# Patient Record
Sex: FEMALE | Race: WHITE | NOT HISPANIC OR LATINO | URBAN - METROPOLITAN AREA
[De-identification: names, ages, dates, MRNs, and addresses within clinical notes are randomized per-mention and may not be internally consistent; named-entity substitution may affect disease eponyms.]

---

## 2017-02-23 VITALS
RESPIRATION RATE: 16 BRPM | SYSTOLIC BLOOD PRESSURE: 140 MMHG | OXYGEN SATURATION: 96 % | DIASTOLIC BLOOD PRESSURE: 73 MMHG | TEMPERATURE: 98 F | HEART RATE: 74 BPM

## 2017-02-23 RX ORDER — CHLORHEXIDINE GLUCONATE 213 G/1000ML
1 SOLUTION TOPICAL ONCE
Qty: 0 | Refills: 0 | Status: DISCONTINUED | OUTPATIENT
Start: 2017-02-24 | End: 2017-02-24

## 2017-02-23 NOTE — H&P ADULT. - PMH
Anxiety    CAD (coronary artery disease)    COPD (chronic obstructive pulmonary disease)    Depression    GERD (gastroesophageal reflux disease)    Heart failure, diastolic, chronic    Hyperlipidemia    Hypertension    PUD (peptic ulcer disease)

## 2017-02-23 NOTE — H&P ADULT. - ASSESSMENT
60 y/o Female former smoker, FHx of early MI's (Mother 60's, Father 40's), PMHx HTN, chronic diastolic CHF (EF 60% by NST) hyperlipidemia, diverticulosis s/p recent diverticulitis 2 weeks ago (completed course of Flagyl), ?PUD (non-bleeding), GERD, depression/anxiety, COPD (no hospitalizations/ intubations), Latent Tuberculosis, known CAD s/p DEENA to RCA at [facility] 2009, with recent angiogram 07/2015 @[facility] (RCA patent stent, LMCA mild disease, mid/prox LAD 30%, OM1 70-80% non-obstructive by FFR) who recently presented with CCS III/IV anginal equivalent symptoms and was found to have abnormal stress test and now presents for cardiac catheterization with possible intervention 2/2 suspected CAD progression in patient with known OM1 disease.

## 2017-02-23 NOTE — H&P ADULT. - HISTORY OF PRESENT ILLNESS
[facility] cath lab will be faxing over previous cath reports and Dr. Ledesma's office will be faxing office notes and recent NST. Please check tracker.     62 y/o Female former smoker, FHx of early MI's (Mother 60's, Father 40's), PMHx HTN, hyperlipidemia, diverticulosis s/p recent diverticulitis 2 weeks ago (completed course of Flagyl), ?PUD (non-bleeding), depression/anxiety, COPD (no hospitalizations/ intubations), Latent Tuberculosis, known CAD s/p DEENA at [facility] 2015 and 2011 who recently presented to Dr. Ledesma's office complaining of stress-related, substernal chest pain, rated a 5/10 in severity. States the pain occurs at rest and is self-resolving. She is unable to tell if the pain is related to cardiac vs. anxiety vs. GI . Pt also endorses BACA with ambulation of <1 city block. Denies PND, orthopnea, LE edema, GIB, melena, fevers, chills. She subsequently underwent an abnormal Nuclear Stress Test on ___ which revealed.........    In light of pt's known risk factors as above, CCS class III/IV anginal equivalent symptoms, and recent abnormal Nuclear Stress Test, she now presents to [facility] for cardiac catheterization with possible intervention if clinically warranted to assess CAD progression. [facility] cath lab will be faxing over previous cath reports. Please check tracker.     62 y/o Female former smoker, FHx of early MI's (Mother 60's, Father 40's), PMHx HTN, chronic diastolic CHF (EF 60% by NST) hyperlipidemia, diverticulosis s/p recent diverticulitis 2 weeks ago (completed course of Flagyl), ?PUD (non-bleeding), GERD, depression/anxiety, COPD (no hospitalizations/ intubations), Latent Tuberculosis, known CAD s/p DEENA to RCA at [facility] 2009, with recent angiogram 07/2015 @[facility] (RCA patent stent, LMCA mild disease, mid/prox LAD 30%, OM1 70-80% non-obstructive by FFR) who recently presented to Dr. Ledesma's office complaining of stress-related, substernal chest pain, rated a 5/10 in severity. States the pain occurs at rest and is self-resolving. She is unable to tell if the pain is related to cardiac vs. anxiety vs. GI . Pt also endorses BACA with ambulation of <1 city block. Denies PND, orthopnea, LE edema, GIB, melena, fevers, chills. She subsequently underwent an abnormal Nuclear Stress Test on 02/03/2017 which revealed small, mild to moderate , apical and inferoapical wall defect that are partially reversible consistent with ischemia, LVEF 60%.    In light of pt's known risk factors as above, CCS class III/IV anginal equivalent symptoms, and recent abnormal Nuclear Stress Test, she now presents to [facility] for cardiac catheterization with possible intervention if clinically warranted to assess CAD progression. 62 y/o Female former smoker, FHx of early MI's (Mother 60's, Father 40's), PMHx HTN, chronic diastolic CHF (EF 60% by NST) hyperlipidemia, diverticulosis s/p recent diverticulitis 2 weeks ago (completed course of Flagyl), ?PUD (non-bleeding), GERD, depression/anxiety, COPD (no hospitalizations/ intubations), Latent Tuberculosis, known CAD s/p DEENA to RCA at [facility] 2009, with recent angiogram 07/2015 @[facility] (RCA patent stent, LMCA mild disease, mid/prox LAD 30%, OM1 70-80% non-obstructive by FFR) who recently presented to Dr. Ledesma's office complaining of stress-related, substernal chest pain, rated a 5/10 in severity. States the pain occurs at rest and is self-resolving. She is unable to tell if the pain is related to cardiac vs. anxiety vs. GI . Pt also endorses BACA with ambulation of <1 city block. Denies PND, orthopnea, LE edema, GIB, melena, fevers, chills. She subsequently underwent an abnormal Nuclear Stress Test on 02/03/2017 which revealed small, mild to moderate , apical and inferoapical wall defect that are partially reversible consistent with ischemia, LVEF 60%.    In light of pt's known risk factors as above, CCS class III/IV anginal equivalent symptoms, and recent abnormal Nuclear Stress Test, she now presents to [facility] for cardiac catheterization with possible intervention if clinically warranted to assess CAD progression.

## 2017-02-23 NOTE — H&P ADULT. - FAMILY HISTORY
Father  Still living? Unknown  Family history of heart attack, Age at diagnosis: 41-50     Mother  Still living? Unknown  Family history of heart attack, Age at diagnosis: 61-70

## 2017-02-24 ENCOUNTER — OUTPATIENT (OUTPATIENT)
Dept: OUTPATIENT SERVICES | Facility: HOSPITAL | Age: 62
LOS: 1 days | Discharge: NURSING, REHAB OR LONG-TERM CARE | End: 2017-02-24
Payer: MEDICARE

## 2017-02-24 DIAGNOSIS — I25.110 ATHEROSCLEROTIC HEART DISEASE OF NATIVE CORONARY ARTERY WITH UNSTABLE ANGINA PECTORIS: ICD-10-CM

## 2017-02-24 DIAGNOSIS — E78.5 HYPERLIPIDEMIA, UNSPECIFIED: ICD-10-CM

## 2017-02-24 DIAGNOSIS — I10 ESSENTIAL (PRIMARY) HYPERTENSION: ICD-10-CM

## 2017-02-24 DIAGNOSIS — R94.39 ABNORMAL RESULT OF OTHER CARDIOVASCULAR FUNCTION STUDY: ICD-10-CM

## 2017-02-24 LAB
ALBUMIN SERPL ELPH-MCNC: 3.7 G/DL — SIGNIFICANT CHANGE UP (ref 3.4–5)
ALP SERPL-CCNC: 92 U/L — SIGNIFICANT CHANGE UP (ref 40–120)
ALT FLD-CCNC: 20 U/L — SIGNIFICANT CHANGE UP (ref 12–42)
ANION GAP SERPL CALC-SCNC: 6 MMOL/L — LOW (ref 9–16)
APTT BLD: 28.7 SEC — SIGNIFICANT CHANGE UP (ref 27.5–37.4)
AST SERPL-CCNC: 15 U/L — SIGNIFICANT CHANGE UP (ref 15–37)
BASOPHILS NFR BLD AUTO: 0.4 % — SIGNIFICANT CHANGE UP (ref 0–2)
BILIRUB SERPL-MCNC: 0.3 MG/DL — SIGNIFICANT CHANGE UP (ref 0.2–1.2)
BUN SERPL-MCNC: 16 MG/DL — SIGNIFICANT CHANGE UP (ref 7–23)
CALCIUM SERPL-MCNC: 8.4 MG/DL — LOW (ref 8.5–10.5)
CHLORIDE SERPL-SCNC: 103 MMOL/L — SIGNIFICANT CHANGE UP (ref 96–108)
CHOLEST SERPL-MCNC: 155 MG/DL — SIGNIFICANT CHANGE UP
CK MB CFR SERPL CALC: <1 NG/ML — SIGNIFICANT CHANGE UP (ref 0.5–3.6)
CO2 SERPL-SCNC: 29 MMOL/L — SIGNIFICANT CHANGE UP (ref 22–31)
CREAT SERPL-MCNC: 0.96 MG/DL — SIGNIFICANT CHANGE UP (ref 0.5–1.3)
CRP SERPL-MCNC: <0.29 MG/DL — SIGNIFICANT CHANGE UP
EOSINOPHIL NFR BLD AUTO: 2.4 % — SIGNIFICANT CHANGE UP (ref 0–6)
GLUCOSE SERPL-MCNC: 124 MG/DL — HIGH (ref 70–99)
HBA1C BLD-MCNC: 6.3 % — HIGH (ref 4.8–5.6)
HCT VFR BLD CALC: 37.4 % — SIGNIFICANT CHANGE UP (ref 34.5–45)
HDLC SERPL-MCNC: 43 MG/DL — SIGNIFICANT CHANGE UP
HGB BLD-MCNC: 12.1 G/DL — SIGNIFICANT CHANGE UP (ref 11.5–15.5)
INR BLD: 0.99 — SIGNIFICANT CHANGE UP (ref 0.88–1.16)
LIPID PNL WITH DIRECT LDL SERPL: 80 MG/DL — SIGNIFICANT CHANGE UP
LYMPHOCYTES # BLD AUTO: 27.9 % — SIGNIFICANT CHANGE UP (ref 13–44)
MCHC RBC-ENTMCNC: 27.4 PG — SIGNIFICANT CHANGE UP (ref 27–34)
MCHC RBC-ENTMCNC: 32.4 G/DL — SIGNIFICANT CHANGE UP (ref 32–36)
MCV RBC AUTO: 84.6 FL — SIGNIFICANT CHANGE UP (ref 80–100)
MONOCYTES NFR BLD AUTO: 10.2 % — SIGNIFICANT CHANGE UP (ref 2–14)
NEUTROPHILS NFR BLD AUTO: 59.1 % — SIGNIFICANT CHANGE UP (ref 43–77)
PLATELET # BLD AUTO: 239 K/UL — SIGNIFICANT CHANGE UP (ref 150–400)
POTASSIUM SERPL-MCNC: 4.1 MMOL/L — SIGNIFICANT CHANGE UP (ref 3.5–5.3)
POTASSIUM SERPL-SCNC: 4.1 MMOL/L — SIGNIFICANT CHANGE UP (ref 3.5–5.3)
PROT SERPL-MCNC: 7.4 G/DL — SIGNIFICANT CHANGE UP (ref 6.4–8.2)
PROTHROM AB SERPL-ACNC: 11 SEC — SIGNIFICANT CHANGE UP (ref 10–13.1)
RBC # BLD: 4.42 M/UL — SIGNIFICANT CHANGE UP (ref 3.8–5.2)
RBC # FLD: 13.5 % — SIGNIFICANT CHANGE UP (ref 10.3–16.9)
SODIUM SERPL-SCNC: 138 MMOL/L — SIGNIFICANT CHANGE UP (ref 135–145)
TOTAL CHOLESTEROL/HDL RATIO MEASUREMENT: 3.6 RATIO — SIGNIFICANT CHANGE UP
TRIGL SERPL-MCNC: 161 MG/DL — HIGH
WBC # BLD: 7.2 K/UL — SIGNIFICANT CHANGE UP (ref 3.8–10.5)
WBC # FLD AUTO: 7.2 K/UL — SIGNIFICANT CHANGE UP (ref 3.8–10.5)

## 2017-02-24 PROCEDURE — C1889: CPT

## 2017-02-24 PROCEDURE — 86140 C-REACTIVE PROTEIN: CPT

## 2017-02-24 PROCEDURE — 80061 LIPID PANEL: CPT

## 2017-02-24 PROCEDURE — 82550 ASSAY OF CK (CPK): CPT

## 2017-02-24 PROCEDURE — C1887: CPT

## 2017-02-24 PROCEDURE — 85730 THROMBOPLASTIN TIME PARTIAL: CPT

## 2017-02-24 PROCEDURE — 93010 ELECTROCARDIOGRAM REPORT: CPT

## 2017-02-24 PROCEDURE — 93458 L HRT ARTERY/VENTRICLE ANGIO: CPT | Mod: 26

## 2017-02-24 PROCEDURE — 80053 COMPREHEN METABOLIC PANEL: CPT

## 2017-02-24 PROCEDURE — 93571 IV DOP VEL&/PRESS C FLO 1ST: CPT

## 2017-02-24 PROCEDURE — 85025 COMPLETE CBC W/AUTO DIFF WBC: CPT

## 2017-02-24 PROCEDURE — 93571 IV DOP VEL&/PRESS C FLO 1ST: CPT | Mod: 26

## 2017-02-24 PROCEDURE — 82553 CREATINE MB FRACTION: CPT

## 2017-02-24 PROCEDURE — 93005 ELECTROCARDIOGRAM TRACING: CPT

## 2017-02-24 PROCEDURE — 85610 PROTHROMBIN TIME: CPT

## 2017-02-24 PROCEDURE — C1769: CPT

## 2017-02-24 PROCEDURE — 83036 HEMOGLOBIN GLYCOSYLATED A1C: CPT

## 2017-02-24 PROCEDURE — 93458 L HRT ARTERY/VENTRICLE ANGIO: CPT

## 2017-02-24 RX ORDER — ASPIRIN/CALCIUM CARB/MAGNESIUM 324 MG
325 TABLET ORAL ONCE
Qty: 0 | Refills: 0 | Status: COMPLETED | OUTPATIENT
Start: 2017-02-24 | End: 2017-02-24

## 2017-02-24 RX ORDER — SODIUM CHLORIDE 9 MG/ML
500 INJECTION, SOLUTION INTRAVENOUS
Qty: 0 | Refills: 0 | Status: DISCONTINUED | OUTPATIENT
Start: 2017-02-24 | End: 2017-02-24

## 2017-02-24 RX ORDER — ACETAMINOPHEN 500 MG
650 TABLET ORAL ONCE
Qty: 0 | Refills: 0 | Status: DISCONTINUED | OUTPATIENT
Start: 2017-02-24 | End: 2017-02-24

## 2017-02-24 RX ORDER — SODIUM CHLORIDE 9 MG/ML
1000 INJECTION, SOLUTION INTRAVENOUS
Qty: 0 | Refills: 0 | Status: DISCONTINUED | OUTPATIENT
Start: 2017-02-24 | End: 2017-02-24

## 2017-02-24 RX ADMIN — SODIUM CHLORIDE 50 MILLILITER(S): 9 INJECTION, SOLUTION INTRAVENOUS at 12:12

## 2017-02-24 RX ADMIN — Medication 325 MILLIGRAM(S): at 11:14

## 2017-02-24 NOTE — PROGRESS NOTE ADULT - SUBJECTIVE AND OBJECTIVE BOX
Interventional Cardiology PA SDA Discharge Note    Patient without complaints. Ambulated and voided without difficulties    Afebrile, VSS    Ext:    		Right Radial :   no hematoma,   no  bleeding, dressing; C/D/I      Pulses:    intact RAD to baseline     A/P:  60 y/o Female former smoker, FHx of early MI's (Mother 60's, Father 40's), PMHx HTN, chronic diastolic CHF (EF 60% by NST) hyperlipidemia, diverticulosis s/p recent diverticulitis 2 weeks ago (completed course of Flagyl), ?PUD (non-bleeding), GERD, depression/anxiety, COPD (no hospitalizations/ intubations), Latent Tuberculosis, known CAD s/p DEENA to RCA at [facility] 2009, with recent angiogram 07/2015 @[facility] (RCA patent stent, LMCA mild disease, mid/prox LAD 30%, OM1 70-80% non-obstructive by FFR) who recently presented with CCS III/IV anginal equivalent symptoms and was found to have abnormal stress test.    Pt is now s/p cardiac cath with Dr. Cortez revealing non-obstructive CAD (LM normal, prox LAD50%, FFR 0.91, mid LAD myocardial bridge, distal LCx 50%, prox RCA stent patent, EDP 17).    1.	Stable for discharge as per attending Dr. Ledesma after bed rest, pt voids, groin/wrist stable and 30 minutes of ambulation.  2.	Follow-up with PMD/Cardiologist Dana in 1-2 weeks  3.	Discharged forms signed and copies in chart

## 2017-04-24 ENCOUNTER — HOSPITAL ENCOUNTER (INPATIENT)
Dept: HOSPITAL 74 - JER | Age: 62
LOS: 6 days | Discharge: HOME | DRG: 392 | End: 2017-04-30
Attending: INTERNAL MEDICINE | Admitting: INTERNAL MEDICINE
Payer: COMMERCIAL

## 2017-04-24 VITALS — BODY MASS INDEX: 31 KG/M2

## 2017-04-24 DIAGNOSIS — K66.8: ICD-10-CM

## 2017-04-24 DIAGNOSIS — I25.10: ICD-10-CM

## 2017-04-24 DIAGNOSIS — I27.2: ICD-10-CM

## 2017-04-24 DIAGNOSIS — K57.32: Primary | ICD-10-CM

## 2017-04-24 DIAGNOSIS — Z98.61: ICD-10-CM

## 2017-04-24 DIAGNOSIS — K21.9: ICD-10-CM

## 2017-04-24 DIAGNOSIS — E78.5: ICD-10-CM

## 2017-04-24 DIAGNOSIS — F32.9: ICD-10-CM

## 2017-04-24 DIAGNOSIS — Z87.891: ICD-10-CM

## 2017-04-24 DIAGNOSIS — I10: ICD-10-CM

## 2017-04-24 LAB
ALBUMIN SERPL-MCNC: 3.7 G/DL (ref 3.4–5)
ALP SERPL-CCNC: 95 U/L (ref 45–117)
ALT SERPL-CCNC: 19 U/L (ref 12–78)
AMYLASE SERPL-CCNC: 55 U/L (ref 25–115)
ANION GAP SERPL CALC-SCNC: 10 MMOL/L (ref 8–16)
APPEARANCE UR: CLEAR
AST SERPL-CCNC: 17 U/L (ref 15–37)
BACTERIA #/AREA URNS HPF: (no result) /HPF
BASOPHILS # BLD: 0.3 % (ref 0–2)
BILIRUB SERPL-MCNC: 0.4 MG/DL (ref 0.2–1)
BILIRUB UR STRIP.AUTO-MCNC: NEGATIVE MG/DL
CALCIUM SERPL-MCNC: 8.9 MG/DL (ref 8.5–10.1)
CO2 SERPL-SCNC: 26 MMOL/L (ref 21–32)
COCKROFT - GAULT: 77.46
COLOR UR: (no result)
CREAT SERPL-MCNC: 1.1 MG/DL (ref 0.55–1.02)
DEPRECATED RDW RBC AUTO: 13.5 % (ref 11.6–15.6)
EOSINOPHIL # BLD: 0.1 % (ref 0–4.5)
GLUCOSE SERPL-MCNC: 116 MG/DL (ref 74–106)
INR BLD: 1.05 (ref 0.82–1.09)
KETONES UR QL STRIP: NEGATIVE
LEUKOCYTE ESTERASE UR QL STRIP.AUTO: (no result)
MCH RBC QN AUTO: 28.2 PG (ref 25.7–33.7)
MCHC RBC AUTO-ENTMCNC: 33.4 G/DL (ref 32–36)
MCV RBC: 84.3 FL (ref 80–96)
NEUTROPHILS # BLD: 80.4 % (ref 42.8–82.8)
NITRITE UR QL STRIP: NEGATIVE
PH UR: 7 [PH] (ref 5–8)
PLATELET # BLD AUTO: 207 K/MM3 (ref 134–434)
PMV BLD: 8.3 FL (ref 7.5–11.1)
PROT SERPL-MCNC: 7 G/DL (ref 6.4–8.2)
PROT UR QL STRIP: NEGATIVE
PROT UR QL STRIP: NEGATIVE
PT PNL PPP: 11.6 SEC (ref 9.98–11.88)
RBC # BLD AUTO: 3 /HPF (ref 0–3)
RBC # UR STRIP: NEGATIVE /UL
SP GR UR: 1.01 (ref 1–1.03)
UROBILINOGEN UR STRIP-MCNC: NEGATIVE E.U./DL (ref 0.2–1)
WBC # BLD AUTO: 13.6 K/MM3 (ref 4–10)
WBC # UR AUTO: 5 /HPF (ref 3–5)

## 2017-04-24 RX ADMIN — METRONIDAZOLE SCH MLS/HR: 500 INJECTION, SOLUTION INTRAVENOUS at 14:59

## 2017-04-24 RX ADMIN — METRONIDAZOLE SCH MLS/HR: 500 INJECTION, SOLUTION INTRAVENOUS at 20:10

## 2017-04-24 RX ADMIN — MORPHINE SULFATE PRN MG: 4 INJECTION, SOLUTION INTRAMUSCULAR; INTRAVENOUS at 21:15

## 2017-04-24 RX ADMIN — HEPARIN SODIUM SCH UNIT: 5000 INJECTION, SOLUTION INTRAVENOUS; SUBCUTANEOUS at 22:50

## 2017-04-24 RX ADMIN — SODIUM CHLORIDE SCH MLS/HR: 9 INJECTION, SOLUTION INTRAVENOUS at 23:47

## 2017-04-24 RX ADMIN — PANTOPRAZOLE SODIUM SCH MLS/HR: 40 INJECTION, POWDER, FOR SOLUTION INTRAVENOUS at 21:15

## 2017-04-24 RX ADMIN — MORPHINE SULFATE PRN MG: 4 INJECTION, SOLUTION INTRAMUSCULAR; INTRAVENOUS at 17:25

## 2017-04-24 RX ADMIN — SODIUM CHLORIDE SCH MLS/HR: 9 INJECTION, SOLUTION INTRAVENOUS at 12:50

## 2017-04-24 RX ADMIN — CEFTRIAXONE SCH GM: 1 INJECTION, POWDER, FOR SOLUTION INTRAMUSCULAR; INTRAVENOUS at 12:50

## 2017-04-24 NOTE — EKG
Test Reason : 

Blood Pressure : ***/*** mmHG

Vent. Rate : 073 BPM     Atrial Rate : 073 BPM

   P-R Int : 138 ms          QRS Dur : 072 ms

    QT Int : 410 ms       P-R-T Axes : 074 071 059 degrees

   QTc Int : 451 ms

 

NORMAL SINUS RHYTHM

NORMAL ECG

WHEN COMPARED WITH ECG OF 22-MAY-2011 11:20,

NO SIGNIFICANT CHANGE WAS FOUND

Confirmed by HARLAN HOOKS MD ([id number]) on 4/24/2017 11:05:10 AM

 

Referred By:             Confirmed By:HARLAN HOOKS MD

## 2017-04-24 NOTE — HP
Admitting History and Physical





- Admission


Chief Complaint: Abdominal pain


History of Present Illness: 


Pt is a 60 y/o female w/ multiple medical problems including HTN, HLD, CHF, CAD

, GERD, uterine fibroids, diverticulitis and depression.  In the past 24 hrs pt 

developed diffuse abdominal pain wc then became more suprapubic.  Pt was at 

home and thought it might be her diverticulitis and was thinking of waiting and 

going to see her gastroenterologist however pain then became so severe she came 

to ER.  This abdominal pain was associated w/ nausea/vomiting.  In the ER pt 

had ct scan abd wc showed pneumoperitoneum/acute diverticulitis. Her vital 

signs showed tmax of 100.7 w/ heart rate of 95 and BP of 107/78.


History Source: Patient, Medical Record





- Past Medical History


Cardiovascular: Yes: CAD, HTN, Hyperlipdemia


Gastrointestinal: Yes: Diverticulitis, GERD


Reproductive: Yes: Fibroids


...Pregnant: No


Psych: Yes: Depression





- Smoking History


Smoking history: Former smoker


Have you smoked in the past 12 months: No


If you are a former smoker, when did you quit?: 2008





- Alcohol/Substance Use


Hx Alcohol Use: No





Home Medications





- Allergies


Allergies/Adverse Reactions: 


 Allergies











Allergy/AdvReac Type Severity Reaction Status Date / Time


 


clarithromycin [From Biaxin] Allergy   Verified 04/24/17 04:27


 


codeine Allergy   Verified 04/24/17 04:27


 


Penicillins Allergy   Verified 04/24/17 04:27














- Home Medications


Home Medications: 


Ambulatory Orders





Acetaminophen [Tylenol -] 1,000 mg PO Q6H 04/24/17 


Albuterol Sulfate [Proair Respiclick] 90 mcg IH QID 04/24/17 


Ascorbic Acid [Vitamin C -] 1 tab PO ASDIR 04/24/17 


Aspirin Coated [Ecotrin -] 81 mg PO DAILY 04/24/17 


Bupropion HCl [Bupropion Xl] 150 mg PO DAILY 04/24/17 


Clopidogrel Bisulfate [Plavix -] 75 mg PO DAILY 04/24/17 


Clotrimazole/Betamethasone Dip [Clotrimazole-Betamethasone Lot] 1 applic TD 

ASDIR 04/24/17 


Esomeprazole Magnesium [Nexium 24Hr] 40 mg PO DAILY 04/24/17 


Furosemide [Lasix -] 20 mg PO Q2D 04/24/17 


Lipase/Protease/Amylase [Creon Dr 36,000 Units Capsule] 1 each PO AC 04/24/17 


Loperamide HCl [Loperamide] 2 mg PO Q6H PRN 04/24/17 


Melatonin 5 mg PO HS 04/24/17 


Simvastatin [Zocor -] 40 mg PO HS 04/24/17 











Family Disease History





- Family Disease History


Family History: Unremarkable





Review of Systems





- Review of Systems


Constitutional: reports: Fever, Loss of Appetite, Malaise, Weakness


Eyes: reports: No Symptoms


HENT: reports: No Symptoms


Neck: reports: No Symptoms


Cardiovascular: reports: No Symptoms


Respiratory: reports: No Symptoms


Gastrointestinal: reports: Abdominal Pain, Nausea, Vomiting


Genitourinary: reports: No Symptoms





Physical Examination


Vital Signs: 


 Vital Signs











Temperature  98.5 F   04/24/17 13:48


 


Pulse Rate  78   04/24/17 13:48


 


Respiratory Rate  18   04/24/17 11:15


 


Blood Pressure  131/68   04/24/17 13:48


 


O2 Sat by Pulse Oximetry (%)  96   04/24/17 11:40











Constitutional: Yes: Well Nourished


Eyes: Yes: WNL


HENT: Yes: WNL


Neck: Yes: Supple


Cardiovascular: Yes: WNL, Regular Rate and Rhythm


Respiratory: Yes: WNL, Regular, CTA Bilaterally


Gastrointestinal: Yes: Soft, Abdomen, Obese, Other ((+) minimal tenderness (-) 

guarding/rebound)


Musculoskeletal: Yes: WNL


Extremities: Yes: WNL


Neurological: Yes: WNL, Alert, Oriented


...Motor Strength: WNL





Problem List





- Problems


(1) Diverticulitis


Assessment/Plan: 


Acute diverticulitis w/ pneumoperitoneum


Cont IV antibxs/fluids


Monitor labs


As per surgery


NPO


GI consult


Code(s): K57.92 - DVTRCLI OF INTEST, PART UNSP, W/O PERF OR ABSCESS W/O BLEED   

Qualifiers: 


     Diverticulitis site: large intestine     Diverticulitis bleeding: without 

bleeding     Diverticulitis complication: with perforation     Qualified Code(s)

: K57.20 - Diverticulitis of large intestine with perforation and abscess 

without bleeding  





(2) HTN (hypertension)


Assessment/Plan: 


BP meds on hold


Will get cardio consult for possible optimization for surgery if needed


Check echo


Code(s): I10 - ESSENTIAL (PRIMARY) HYPERTENSION   





(3) CAD (coronary artery disease)


Assessment/Plan: 


Plavix on hold due to fact pt may need surgical intervention


Code(s): I25.10 - ATHSCL HEART DISEASE OF NATIVE CORONARY ARTERY W/O ANG PCTRS 

  





(4) HLD (hyperlipidemia)


Code(s): E78.5 - HYPERLIPIDEMIA, UNSPECIFIED   





(5) Depression


Code(s): F32.9 - MAJOR DEPRESSIVE DISORDER, SINGLE EPISODE, UNSPECIFIED

## 2017-04-24 NOTE — PN
Progress Note (short form)





- Note


Progress Note: 


surgery





pt seen and examined.  full consult dictated.  61f with previous diverticulitis

, last colonoscopy 1 year ago, hx pud on nexium, on plavix for cad/stents, 

devolped abd pain in the setting of brochitis since Friday.  Pain better now 

then Friday.  Ct shows possible diverticulitis with 3 small dropletts of air 

over the liver.  wbc 13.  tmax 100.7, bp 122/65, hr 65.  Pt was started on 

levaquin and flagyl and admitted to medical floor and surgical consultation 

called.





on exam pt is comfortable.  abd is soft, obese, moderate llq tenderness, no 

tenderness in upper abd.





Plan- likely complicated diverticulitis with free air in setting of Plavix.  

Surgery would be very risky (Plavix) and pt appears to have sealed the 

perforation.  Pt offered surgical exploration and declines.  she does not want 

a colostomy.





will keep npo.  change levaquin to rocephin for better e.coli coverage.  may 

benefit from ID eval.  will also give ppi in case of pud.  If patient 

deteriorates will require laparotomy. Hold Plavix (maybe should be evaluated to 

have it stopped in general since stents are 7 years old)





will follow.

## 2017-04-24 NOTE — PDOC
*Physical Exam





- Vital Signs


 Last Vital Signs











Temp Pulse Resp BP Pulse Ox


 


 100.7 F H  95 H  18   107/78   99 


 


 04/24/17 04:13  04/24/17 04:13  04/24/17 04:13  04/24/17 04:13  04/24/17 04:13














<Roel Nieves - Last Filed: 04/24/17 10:05>





- Vital Signs


 Last Vital Signs











Temp Pulse Resp BP Pulse Ox


 


 100.7 F H  95 H  18   107/78   99 


 


 04/24/17 04:13  04/24/17 04:13  04/24/17 04:13  04/24/17 04:13  04/24/17 04:13














<Velvet Wilhelm - Last Filed: 04/24/17 10:59>





ED Treatment Course





- LABORATORY


CBC & Chemistry Diagram: 


 04/24/17 05:05





 04/24/17 05:05





- ADDITIONAL ORDERS


Additional order review: 


 Laboratory  Results











  04/24/17 04/24/17 04/24/17





  06:03 05:05 05:05


 


INR   


 


Sodium   


 


Potassium   


 


Chloride   


 


Carbon Dioxide   


 


Anion Gap   


 


BUN   


 


Creatinine   


 


Creat Clearance w eGFR   


 


Random Glucose   


 


Lactic Acid   0.901 


 


Calcium   


 


Total Bilirubin   


 


AST   


 


ALT   


 


Alkaline Phosphatase   


 


Total Protein   


 


Albumin   


 


Total Amylase    55


 


Lipase    82


 


Urine Color  Ltyellow  


 


Urine Appearance  Clear  


 


Urine pH  7.0  


 


Ur Specific Gravity  1.010  


 


Urine Protein  Negative  


 


Urine Glucose (UA)  Negative  


 


Urine Ketones  Negative  


 


Urine Blood  Negative  


 


Urine Nitrite  Negative  


 


Urine Bilirubin  Negative  


 


Urine Urobilinogen  Negative  


 


Ur Leukocyte Esterase  1+ H  


 


Urine RBC  3  


 


Urine WBC  5  


 


Ur Epithelial Cells  Rare  


 


Urine Bacteria  Rare  














  04/24/17 04/24/17





  05:05 05:05


 


INR  1.05 


 


Sodium   138


 


Potassium   4.7


 


Chloride   102


 


Carbon Dioxide   26


 


Anion Gap   10


 


BUN   21 H


 


Creatinine   1.1 H D


 


Creat Clearance w eGFR   50.33


 


Random Glucose   116 H


 


Lactic Acid  


 


Calcium   8.9


 


Total Bilirubin   0.4  D


 


AST   17  D


 


ALT   19  D


 


Alkaline Phosphatase   95


 


Total Protein   7.0


 


Albumin   3.7


 


Total Amylase  


 


Lipase  


 


Urine Color  


 


Urine Appearance  


 


Urine pH  


 


Ur Specific Gravity  


 


Urine Protein  


 


Urine Glucose (UA)  


 


Urine Ketones  


 


Urine Blood  


 


Urine Nitrite  


 


Urine Bilirubin  


 


Urine Urobilinogen  


 


Ur Leukocyte Esterase  


 


Urine RBC  


 


Urine WBC  


 


Ur Epithelial Cells  


 


Urine Bacteria  








 











  04/24/17





  05:05


 


RBC  4.44


 


MCV  84.3


 


MCHC  33.4


 


RDW  13.5


 


MPV  8.3


 


Neutrophils %  80.4


 


Lymphocytes %  12.0


 


Monocytes %  7.2


 


Eosinophils %  0.1


 


Basophils %  0.3














- RADIOLOGY


Radiograph Interpretation: 


04/24/17 09:29


EXAM: CT Abdomen and Pelvis


INTERPRETED BY: Dr. Daley


REVIEWED BY: Dr. Wilhelm


IMPRESSION: Pneumoperitoneum of uncertain etiology. There is a mild degree of 

acute diverticulitis of the mid descending colon which could be the source of 

the free air. Clinical correlation and follow-up recommended.





EXAM: CXR


INTERPRETED BY: Dr. Pruett


REVIEWED BY: Dr. Wilhelm


IMPRESSION: No evidence of active pulmonary disease








- Medications


Given in the ED: 


ED Medications














Discontinued Medications














Generic Name Dose Route Start Last Admin





  Trade Name Freq  PRN Reason Stop Dose Admin


 


Hydromorphone HCl  2 mg 04/24/17 06:23 04/24/17 06:27





  Dilaudid Injection -  IVPUSH 04/24/17 06:24  2 mg





  NOW ONE   Administration


 


Levofloxacin  100 mls @ 100 mls/hr 04/24/17 04:57 04/24/17 06:19





  Levaquin 500 Mg Premixed Ivpb -  IVPB 04/24/17 05:56  100 mls/hr





  ONCE ONE   Administration


 


Metronidazole  100 mls @ 100 mls/hr 04/24/17 04:58 04/24/17 05:59





  Flagyl 500mg Premixed Ivpb -  IVPB 04/24/17 05:57  100 mls/hr





  ONCE ONE   Administration


 


Ibuprofen  600 mg 04/24/17 05:11 04/24/17 06:16





  Motrin -  PO 04/24/17 05:12  Not Given





  ONCE ONE   


 


Metoclopramide HCl  10 mg 04/24/17 04:58 04/24/17 05:17





  Reglan Injection -  IVPB 04/24/17 04:59  10 mg





  ONCE ONE   Administration


 


Morphine Sulfate  2 mg 04/24/17 04:57 04/24/17 05:22





  Morphine Injection -  IVPUSH 04/24/17 04:58  2 mg





  ONCE ONE   Administration


 


Sodium Chloride  1,000 ml 04/24/17 04:57 04/24/17 05:17





  Normal Saline -  IV 04/24/17 04:58  1,000 ml





  ONCE ONE   Administration














<Roel Nieves - Last Filed: 04/24/17 10:05>





- LABORATORY


CBC & Chemistry Diagram: 


 04/24/17 05:05





 04/24/17 05:05





- ADDITIONAL ORDERS


Additional order review: 


 Laboratory  Results











  04/24/17 04/24/17 04/24/17





  06:03 05:05 05:05


 


INR   


 


Sodium   


 


Potassium   


 


Chloride   


 


Carbon Dioxide   


 


Anion Gap   


 


BUN   


 


Creatinine   


 


Creat Clearance w eGFR   


 


Random Glucose   


 


Lactic Acid   0.901 


 


Calcium   


 


Total Bilirubin   


 


AST   


 


ALT   


 


Alkaline Phosphatase   


 


Total Protein   


 


Albumin   


 


Total Amylase    55


 


Lipase    82


 


Urine Color  Ltyellow  


 


Urine Appearance  Clear  


 


Urine pH  7.0  


 


Ur Specific Gravity  1.010  


 


Urine Protein  Negative  


 


Urine Glucose (UA)  Negative  


 


Urine Ketones  Negative  


 


Urine Blood  Negative  


 


Urine Nitrite  Negative  


 


Urine Bilirubin  Negative  


 


Urine Urobilinogen  Negative  


 


Ur Leukocyte Esterase  1+ H  


 


Urine RBC  3  


 


Urine WBC  5  


 


Ur Epithelial Cells  Rare  


 


Urine Bacteria  Rare  














  04/24/17 04/24/17





  05:05 05:05


 


INR  1.05 


 


Sodium   138


 


Potassium   4.7


 


Chloride   102


 


Carbon Dioxide   26


 


Anion Gap   10


 


BUN   21 H


 


Creatinine   1.1 H D


 


Creat Clearance w eGFR   50.33


 


Random Glucose   116 H


 


Lactic Acid  


 


Calcium   8.9


 


Total Bilirubin   0.4  D


 


AST   17  D


 


ALT   19  D


 


Alkaline Phosphatase   95


 


Total Protein   7.0


 


Albumin   3.7


 


Total Amylase  


 


Lipase  


 


Urine Color  


 


Urine Appearance  


 


Urine pH  


 


Ur Specific Gravity  


 


Urine Protein  


 


Urine Glucose (UA)  


 


Urine Ketones  


 


Urine Blood  


 


Urine Nitrite  


 


Urine Bilirubin  


 


Urine Urobilinogen  


 


Ur Leukocyte Esterase  


 


Urine RBC  


 


Urine WBC  


 


Ur Epithelial Cells  


 


Urine Bacteria  








 











  04/24/17





  05:05


 


RBC  4.44


 


MCV  84.3


 


MCHC  33.4


 


RDW  13.5


 


MPV  8.3


 


Neutrophils %  80.4


 


Lymphocytes %  12.0


 


Monocytes %  7.2


 


Eosinophils %  0.1


 


Basophils %  0.3














- Medications


Given in the ED: 


ED Medications














Discontinued Medications














Generic Name Dose Route Start Last Admin





  Trade Name Herminia  PRN Reason Stop Dose Admin


 


Hydromorphone HCl  2 mg 04/24/17 06:23 04/24/17 06:27





  Dilaudid Injection -  IVPUSH 04/24/17 06:24  2 mg





  NOW ONE   Administration


 


Levofloxacin  100 mls @ 100 mls/hr 04/24/17 04:57 04/24/17 06:19





  Levaquin 500 Mg Premixed Ivpb -  IVPB 04/24/17 05:56  100 mls/hr





  ONCE ONE   Administration


 


Metronidazole  100 mls @ 100 mls/hr 04/24/17 04:58 04/24/17 05:59





  Flagyl 500mg Premixed Ivpb -  IVPB 04/24/17 05:57  100 mls/hr





  ONCE ONE   Administration


 


Ibuprofen  600 mg 04/24/17 05:11 04/24/17 06:16





  Motrin -  PO 04/24/17 05:12  Not Given





  ONCE ONE   


 


Metoclopramide HCl  10 mg 04/24/17 04:58 04/24/17 05:17





  Reglan Injection -  IVPB 04/24/17 04:59  10 mg





  ONCE ONE   Administration


 


Morphine Sulfate  2 mg 04/24/17 04:57 04/24/17 05:22





  Morphine Injection -  IVPUSH 04/24/17 04:58  2 mg





  ONCE ONE   Administration


 


Sodium Chloride  1,000 ml 04/24/17 04:57 04/24/17 05:17





  Normal Saline -  IV 04/24/17 04:58  1,000 ml





  ONCE ONE   Administration














<Velvet Wilhelm - Last Filed: 04/24/17 10:59>





Medical Decision Making





- Medical Decision Making


04/24/17 09:28


First call placed to Dr. Roldan at 09:25. Awaiting call back.


Case discussed with Dr. Roldan at 9:43.





First call placed to Dr. Melo at 09:47. Awaiting call back.


Case discussed with Dr. Page at 10:05.





<Roel Nieves - Last Filed: 04/24/17 10:05>





- Medical Decision Making


04/24/17 09:18


Called by radiology, patient with pneumoperitoneum. Will contact Dr. Roldan and 

discuss with surgeon of her choice. 








04/24/17 09:35


Pt reassessed. Actively vomiting, c/o mild pain. Emesis is clear. She is with 

diffuse mild tenderness to palpation. I explained CT findings. Will keep her 

NPO. Will admit to Dr. Roldan. 








04/24/17 09:51


Accepted for admission. Awaiting surgery callback. 








04/24/17 10:05


Case d/w Dr. Page. I will keep patient NPO, on IV fluids. He states he will 

evaluate. 








<Velvet Wilhelm - Last Filed: 04/24/17 10:59>





*DC/Admit/Observation/Transfer





- Attestations


Scribe Attestion: 





04/24/17 09:28





Documentation prepared by Roel Nieves, acting as medical scribe for 

Velvet Wilhelm MD.





<Roel Nieves - Last Filed: 04/24/17 10:05>





<Velvet Wilhelm - Last Filed: 04/24/17 10:59>


Diagnosis at time of Disposition: 


 Pneumoperitoneum





Diverticulitis


Qualifiers:


 Diverticulitis site: large intestine Diverticulitis bleeding: without bleeding 

Diverticulitis complication: with perforation Qualified Code(s): K57.20 - 

Diverticulitis of large intestine with perforation and abscess without bleeding





- Discharge Dispostion


Condition at time of disposition: Guarded





- Referrals





- Patient Instructions





- Post Discharge Activity

## 2017-04-24 NOTE — PDOC
History of Present Illness





- General


History Source: Patient


Exam Limitations: No Limitations





- History of Present Illness


Initial Comments: 


04/24/17 05:11





The patient is a 61 year old female, with a significant past medical history of 

diverticulitis and uterine fibroids, who presents to the emergency department 

with worsening abdominal pain since yesterday. The patient reports diffuse 

abdominal pain but reports that it is worst in the suprapubic area. The patient 

does note that her pain specifically worsened after she drank some pineapple 

orange juice earlier today. The patient reports associated nausea and 

nonbilious nonbloody vomiting, with the most recent episode of vomiting 

approximately one hour prior to presentation to the ED. Upon arrival to the ED, 

the patient is noted to be febrile. The patient denies chills, diarrhea, 

constipation, melena/bpr or dysuria. The patients son is at the bedside. 





Allergies: Clarithromycin, Codeine, Penicillins.


Past Surgical History: None reported.


Social History: Non smoker. Denies alcohol or drug use. 


PCP: Dr. Filippo Serrano 


Gastroenterologist: Dr. Rhodes





<Susan Hui - Last Filed: 04/24/17 05:15>





<Micki Magdaleno - Last Filed: 04/24/17 06:53>





<Velvet Wilhelm - Last Filed: 04/24/17 09:47>





- General


Chief Complaint: Pain, Acute


Stated Complaint: ABDOMINAL PAIN


Time Seen by Provider: 04/24/17 04:43





Past History





<Susan Hui - Last Filed: 04/24/17 05:15>





<Micki Magdaleno - Last Filed: 04/24/17 06:53>





<Velvet Wilhelm - Last Filed: 04/24/17 09:47>





- Past Medical History


Allergies/Adverse Reactions: 


 Allergies











Allergy/AdvReac Type Severity Reaction Status Date / Time


 


clarithromycin [From Biaxin] Allergy   Verified 04/24/17 04:27


 


codeine Allergy   Verified 04/24/17 04:27


 


Penicillins Allergy   Verified 04/24/17 04:27











Home Medications: 


Ambulatory Orders





Acetaminophen [Tylenol -] 1,000 mg PO Q6H 04/24/17 


Albuterol Sulfate [Proair Respiclick] 90 mcg IH QID 04/24/17 


Ascorbic Acid [Vitamin C -] 1 tab PO ASDIR 04/24/17 


Aspirin Coated [Ecotrin -] 81 mg PO DAILY 04/24/17 


Bupropion HCl [Bupropion Xl] 150 mg PO DAILY 04/24/17 


Clopidogrel Bisulfate [Plavix -] 75 mg PO DAILY 04/24/17 


Clotrimazole/Betamethasone Dip [Clotrimazole-Betamethasone Lot] 1 applic TD 

ASDIR 04/24/17 


Esomeprazole Magnesium [Nexium 24Hr] 40 mg PO DAILY 04/24/17 


Furosemide [Lasix -] 20 mg PO Q2D 04/24/17 


Lipase/Protease/Amylase [Creon Dr 36,000 Units Capsule] 1 each PO AC 04/24/17 


Loperamide HCl [Loperamide] 2 mg PO Q6H PRN 04/24/17 


Melatonin 5 mg PO HS 04/24/17 


Simvastatin [Zocor -] 40 mg PO HS 04/24/17 











**Review of Systems





- Review of Systems


Able to Perform ROS?: Yes


Comments:: 


04/24/17 04:54





GENERAL/CONSTITUTIONAL: +Fever. No chills. No weakness.


HEAD, EYES, EARS, NOSE AND THROAT: No change in vision. No ear pain or 

discharge. No sore throat.


CARDIOVASCULAR: No chest pain or shortness of breath.


RESPIRATORY: No cough, wheezing, or hemoptysis.


GASTROINTESTINAL: +Nausea, vomiting, abdominal pain. No diarrhea or 

constipation.


GENITOURINARY: No dysuria, frequency, or change in urination.


MUSCULOSKELETAL: No joint or muscle swelling or pain. No neck or back pain.


SKIN: No rash.


NEUROLOGIC: No headache, vertigo, loss of consciousness, or change in strength/

sensation.


ENDOCRINE: No increased thirst. No abnormal weight change.


HEMATOLOGIC/LYMPHATIC: No anemia, easy bleeding, or history of blood clots.


ALLERGIC/IMMUNOLOGIC: No hives or skin allergy.





<Susan Hui - Last Filed: 04/24/17 05:15>





*Physical Exam





- Vital Signs


 Last Vital Signs











Temp Pulse Resp BP Pulse Ox


 


 100.7 F H  95 H  18   107/78   99 


 


 04/24/17 04:13  04/24/17 04:13  04/24/17 04:13  04/24/17 04:13  04/24/17 04:13














- Physical Exam


Comments: 


04/24/17 04:58





GENERAL: Febrile. Awake, alert, and fully oriented, uncomfortable appearing. 


HEAD: No signs of trauma. 


EYES: PERRLA, EOMI, sclera anicteric, conjunctiva clear. 


ENT: Auricles normal inspection, hearing grossly normal, nares patent, 

oropharynx clear without exudates. Moist mucosa. 


NECK: Normal ROM, supple, no lymphadenopathy, JVD, or masses. 


LUNGS: Breath sounds equal, clear to auscultation bilaterally. No wheezes, and 

no crackles. 


HEART: Regular rate and rhythm, normal S1 and S2, no murmurs, rubs or gallops. 


ABDOMEN: Diffuse abdominal tenderness to palpation. Soft, normoactive bowel 

sounds. No guarding, no rebound. No masses. 


EXTREMITIES: Normal range of motion, no edema. No clubbing or cyanosis. No cords

, erythema, or tenderness. 


NEUROLOGICAL: Cranial nerves II through XII intact. Normal speech, normal gait. 


SKIN: Warm, dry, normal turgor, no rashes or lesions noted. 





<Susan Hui - Last Filed: 04/24/17 05:15>





- Vital Signs


 Last Vital Signs











Temp Pulse Resp BP Pulse Ox


 


 100.7 F H  95 H  18   107/78   99 


 


 04/24/17 04:13  04/24/17 04:13  04/24/17 04:13  04/24/17 04:13  04/24/17 04:13














<Velvet Wilhelm - Last Filed: 04/24/17 09:47>





ED Treatment Course





- LABORATORY


CBC & Chemistry Diagram: 


 04/24/17 05:05





 04/24/17 05:05





<Micki Magdaleno - Last Filed: 04/24/17 06:53>





- LABORATORY


CBC & Chemistry Diagram: 


 04/24/17 05:05





 04/24/17 05:05





- ADDITIONAL ORDERS


Additional order review: 


 Laboratory  Results











  04/24/17 04/24/17 04/24/17





  06:03 05:05 05:05


 


INR   


 


Sodium   


 


Potassium   


 


Chloride   


 


Carbon Dioxide   


 


Anion Gap   


 


BUN   


 


Creatinine   


 


Creat Clearance w eGFR   


 


Random Glucose   


 


Lactic Acid   0.901 


 


Calcium   


 


Total Bilirubin   


 


AST   


 


ALT   


 


Alkaline Phosphatase   


 


Total Protein   


 


Albumin   


 


Total Amylase    55


 


Lipase    82


 


Urine Color  Ltyellow  


 


Urine Appearance  Clear  


 


Urine pH  7.0  


 


Ur Specific Gravity  1.010  


 


Urine Protein  Negative  


 


Urine Glucose (UA)  Negative  


 


Urine Ketones  Negative  


 


Urine Blood  Negative  


 


Urine Nitrite  Negative  


 


Urine Bilirubin  Negative  


 


Urine Urobilinogen  Negative  


 


Ur Leukocyte Esterase  1+ H  


 


Urine RBC  3  


 


Urine WBC  5  


 


Ur Epithelial Cells  Rare  


 


Urine Bacteria  Rare  














  04/24/17 04/24/17





  05:05 05:05


 


INR  1.05 


 


Sodium   138


 


Potassium   4.7


 


Chloride   102


 


Carbon Dioxide   26


 


Anion Gap   10


 


BUN   21 H


 


Creatinine   1.1 H D


 


Creat Clearance w eGFR   50.33


 


Random Glucose   116 H


 


Lactic Acid  


 


Calcium   8.9


 


Total Bilirubin   0.4  D


 


AST   17  D


 


ALT   19  D


 


Alkaline Phosphatase   95


 


Total Protein   7.0


 


Albumin   3.7


 


Total Amylase  


 


Lipase  


 


Urine Color  


 


Urine Appearance  


 


Urine pH  


 


Ur Specific Gravity  


 


Urine Protein  


 


Urine Glucose (UA)  


 


Urine Ketones  


 


Urine Blood  


 


Urine Nitrite  


 


Urine Bilirubin  


 


Urine Urobilinogen  


 


Ur Leukocyte Esterase  


 


Urine RBC  


 


Urine WBC  


 


Ur Epithelial Cells  


 


Urine Bacteria  








 











  04/24/17





  05:05


 


RBC  4.44


 


MCV  84.3


 


MCHC  33.4


 


RDW  13.5


 


MPV  8.3


 


Neutrophils %  80.4


 


Lymphocytes %  12.0


 


Monocytes %  7.2


 


Eosinophils %  0.1


 


Basophils %  0.3














- RADIOLOGY


Radiology Studies Ordered: 














 Category Date Time Status


 


 CHEST X-RAY PORTABLE* [RAD] Stat Radiology  04/24/17 09:27 Ordered














- Medications


Given in the ED: 


ED Medications














Discontinued Medications














Generic Name Dose Route Start Last Admin





  Trade Name Freq  PRN Reason Stop Dose Admin


 


Hydromorphone HCl  2 mg 04/24/17 06:23 04/24/17 06:27





  Dilaudid Injection -  IVPUSH 04/24/17 06:24  2 mg





  NOW ONE   Administration


 


Levofloxacin  100 mls @ 100 mls/hr 04/24/17 04:57 04/24/17 06:19





  Levaquin 500 Mg Premixed Ivpb -  IVPB 04/24/17 05:56  100 mls/hr





  ONCE ONE   Administration


 


Metronidazole  100 mls @ 100 mls/hr 04/24/17 04:58 04/24/17 05:59





  Flagyl 500mg Premixed Ivpb -  IVPB 04/24/17 05:57  100 mls/hr





  ONCE ONE   Administration


 


Ibuprofen  600 mg 04/24/17 05:11 04/24/17 06:16





  Motrin -  PO 04/24/17 05:12  Not Given





  ONCE ONE   


 


Metoclopramide HCl  10 mg 04/24/17 04:58 04/24/17 05:17





  Reglan Injection -  IVPB 04/24/17 04:59  10 mg





  ONCE ONE   Administration


 


Morphine Sulfate  2 mg 04/24/17 04:57 04/24/17 05:22





  Morphine Injection -  IVPUSH 04/24/17 04:58  2 mg





  ONCE ONE   Administration


 


Ondansetron HCl  4 mg 04/24/17 09:27 04/24/17 09:41





  Zofran Injection  IVPUSH 04/24/17 09:28  4 mg





  ONCE ONE   Administration


 


Sodium Chloride  1,000 ml 04/24/17 04:57 04/24/17 05:17





  Normal Saline -  IV 04/24/17 04:58  1,000 ml





  ONCE ONE   Administration














<Velvet Wilhelm - Last Filed: 04/24/17 09:47>





Medical Decision Making





- Medical Decision Making





04/24/17 06:57


Pt comes with fever and abd pain that is diffuse. SHe has a history of 

diverticulitis  SHe has an elevated WBC.  SHe has small leukocytes in her urine

, but no dysuria.  She has no flank pain.  She has no cough and no other 

complaitns.  Her abd pain caused her to writhe in pain.  She received Morphine 

4 g and dilaudid 2mg, and ofirmev 1g IV and motrin 600PO.





She will be signed out to the day ER doc.





<Micki Magdaleno - Last Filed: 04/24/17 06:53>





*DC/Admit/Observation/Transfer





- Attestations


Scribe Attestion: 


04/24/17 04:51





Documentation prepared by Susan Hui, acting as medical scribe for Micki Magdaleno MD. 





<Susan Hui - Last Filed: 04/24/17 05:15>





<Micki Magdaleno - Last Filed: 04/24/17 06:53>





- Discharge Dispostion


Admit: Yes





<Velvet Wilhelm - Last Filed: 04/24/17 09:47>


Diagnosis at time of Disposition: 


 Pneumoperitoneum





Diverticulitis of intestine


Qualifiers:


 Diverticulitis site: large intestine Diverticulitis bleeding: without bleeding 

Diverticulitis complication: with perforation Qualified Code(s): K57.20 - 

Diverticulitis of large intestine with perforation and abscess without bleeding





- Discharge Dispostion


Condition at time of disposition: Guarded





- Referrals


Referrals: 


Filippo Serrano MD [Primary Care Provider] -

## 2017-04-25 LAB
ALBUMIN SERPL-MCNC: 3.3 G/DL (ref 3.4–5)
ALP SERPL-CCNC: 80 U/L (ref 45–117)
ALT SERPL-CCNC: 17 U/L (ref 12–78)
ANION GAP SERPL CALC-SCNC: 11 MMOL/L (ref 8–16)
AST SERPL-CCNC: 15 U/L (ref 15–37)
BASOPHILS # BLD: 0.3 % (ref 0–2)
BILIRUB SERPL-MCNC: 0.4 MG/DL (ref 0.2–1)
CALCIUM SERPL-MCNC: 8.3 MG/DL (ref 8.5–10.1)
CO2 SERPL-SCNC: 24 MMOL/L (ref 21–32)
CREAT SERPL-MCNC: 0.9 MG/DL (ref 0.55–1.02)
DEPRECATED RDW RBC AUTO: 13.9 % (ref 11.6–15.6)
EOSINOPHIL # BLD: 0.6 % (ref 0–4.5)
GLUCOSE SERPL-MCNC: 81 MG/DL (ref 74–106)
MCH RBC QN AUTO: 28 PG (ref 25.7–33.7)
MCHC RBC AUTO-ENTMCNC: 32.8 G/DL (ref 32–36)
MCV RBC: 85.5 FL (ref 80–96)
NEUTROPHILS # BLD: 72.8 % (ref 42.8–82.8)
PLATELET # BLD AUTO: 175 K/MM3 (ref 134–434)
PMV BLD: 8.1 FL (ref 7.5–11.1)
PROT SERPL-MCNC: 6.1 G/DL (ref 6.4–8.2)
WBC # BLD AUTO: 9.7 K/MM3 (ref 4–10)

## 2017-04-25 RX ADMIN — HEPARIN SODIUM SCH UNIT: 5000 INJECTION, SOLUTION INTRAVENOUS; SUBCUTANEOUS at 09:40

## 2017-04-25 RX ADMIN — MORPHINE SULFATE PRN MG: 4 INJECTION, SOLUTION INTRAMUSCULAR; INTRAVENOUS at 22:12

## 2017-04-25 RX ADMIN — METRONIDAZOLE SCH MLS/HR: 500 INJECTION, SOLUTION INTRAVENOUS at 02:42

## 2017-04-25 RX ADMIN — MORPHINE SULFATE PRN MG: 4 INJECTION, SOLUTION INTRAMUSCULAR; INTRAVENOUS at 13:58

## 2017-04-25 RX ADMIN — MORPHINE SULFATE PRN MG: 4 INJECTION, SOLUTION INTRAMUSCULAR; INTRAVENOUS at 01:08

## 2017-04-25 RX ADMIN — CEFTRIAXONE SCH GM: 1 INJECTION, POWDER, FOR SOLUTION INTRAMUSCULAR; INTRAVENOUS at 09:40

## 2017-04-25 RX ADMIN — PANTOPRAZOLE SODIUM SCH MLS/HR: 40 INJECTION, POWDER, FOR SOLUTION INTRAVENOUS at 21:33

## 2017-04-25 RX ADMIN — METRONIDAZOLE SCH MLS/HR: 500 INJECTION, SOLUTION INTRAVENOUS at 21:34

## 2017-04-25 RX ADMIN — METRONIDAZOLE SCH MLS/HR: 500 INJECTION, SOLUTION INTRAVENOUS at 08:24

## 2017-04-25 RX ADMIN — SODIUM CHLORIDE SCH MLS/HR: 9 INJECTION, SOLUTION INTRAVENOUS at 08:26

## 2017-04-25 RX ADMIN — HEPARIN SODIUM SCH UNIT: 5000 INJECTION, SOLUTION INTRAVENOUS; SUBCUTANEOUS at 21:33

## 2017-04-25 RX ADMIN — SODIUM CHLORIDE SCH MLS/HR: 9 INJECTION, SOLUTION INTRAVENOUS at 20:50

## 2017-04-25 RX ADMIN — MORPHINE SULFATE PRN MG: 4 INJECTION, SOLUTION INTRAMUSCULAR; INTRAVENOUS at 18:14

## 2017-04-25 RX ADMIN — MORPHINE SULFATE PRN MG: 4 INJECTION, SOLUTION INTRAMUSCULAR; INTRAVENOUS at 09:39

## 2017-04-25 RX ADMIN — METRONIDAZOLE SCH MLS/HR: 500 INJECTION, SOLUTION INTRAVENOUS at 14:16

## 2017-04-25 RX ADMIN — MORPHINE SULFATE PRN MG: 4 INJECTION, SOLUTION INTRAMUSCULAR; INTRAVENOUS at 05:10

## 2017-04-25 RX ADMIN — PANTOPRAZOLE SODIUM SCH MLS/HR: 40 INJECTION, POWDER, FOR SOLUTION INTRAVENOUS at 09:40

## 2017-04-25 NOTE — PN
Progress Note (short form)





- Note


Progress Note: 


surgery





pt seen and examined.  feels well.  some lower abd pain.  hungry





afebrile





abd- soft, mild lower abd tenderness





Plan- perforated viscous, presumed complicated diverticulitis.  keep npo.  cont 

iv abx.  will follow.

## 2017-04-25 NOTE — PN
Progress Note (short form)





- Note


Progress Note: 


IMP:


Diverticulitis


Pneumoperitoneum


CAD s/p PCI





REC:


If urgent surgery is required, there are no cardiac contraindications to 

proceed.


She is in NSR and is euvolemic with no history of recent angina.


No murmur of AS.


Recent cath at [facility] with patent stents.


LV function known to be normal.





Would hold antiplatelet Rx in this setting.


She no longer needs Plavix as PCI > 5 years ago.

## 2017-04-25 NOTE — CONS
DATE OF CONSULTATION:  04/25/2017

 

REQUESTING PHYSICIAN:  Velvet Roldan MD

 

REASON FOR CONSULTATION:  For perioperative assessment. 

 

HISTORY OF PRESENT ILLNESS:  The patient is a 61-year-old female known to our service

from office practice with coronary artery disease, status post PCI 5 years ago.  She

is now admitted after developing severe abdominal discomfort on Sunday.  She was

admitted with leukocytosis and CT scan showed pneumoperitoneum with diverticulitis. 

Surgery was consulted, surgery was offered, but patient declined.  She wished to be

treated medically with the hope of clinical improvement.  She denies recent chest

pain.  She has chronic dyspnea on exertion due to her broncho-reactive disease due to

her history of COPD.  She denies palpitations, syncope, or recent CHF symptoms.  

 

ALLERGIES:  CLARITHROMYCIN, CODEINE, and PENICILLIN.  

 

MEDICATIONS:  Active medications Include ceftriaxone, subcutaneous heparin, Flagyl,

morphine sulfate p.r.n., Protonix.  Home medications include Zocor 40 mg at bedtime,

bupropion 150 daily, Lasix 20 mg every other day, Creon, baby aspirin, melatonin,

Nexium, Plavix 75 daily, albuterol, rescue inhaler, vitamin C, and Tylenol.  

 

FAMILY HISTORY:  Noncontributory.  

 

SOCIAL HISTORY:  A former smoker.

 

PHYSICAL EXAMINATION:

Vital signs:  Temperature originally 100.7, currently 98.8, pulse 75, blood pressure

133/70, O2 saturation 96% on room air.

HEENT:  Anicteric.  

Neck:  No bruits.  

Heart:  S1, 2, regular, no murmurs.

Chest:  Clear.  

Abdomen:  Obese, mild tenderness in the lower quadrants bilaterally with no rebound

or guarding.  

Extremities:  No significant pitting edema.  

 

DIAGNOSTIC DATA:  EKG showed normal sinus rhythm at 73 beats per minute with no acute

ST changes.

 

Labs were reviewed.  White count originally 13.6, now down to 9.7, hematocrit 34,

platelets 175.  INR 1.05. Sodium 144, potassium 3.9, creatinine 0.9.  LFTs normal. 

Lipase 82. 

 

Chest x-ray on April 24 was reviewed.  It showed no evidence of any active

cardiopulmonary disease.  

 

IMPRESSION:  

1.  Diverticulitis, pneumoperitoneum.

2.  Coronary disease, status post percutaneous coronary intervention.

 

RECOMMENDATIONS:  If urgent surgery is required, there are no contraindications to

proceed.  She is in normal sinus rhythm and appears euvolemic with no history of

recent angina.  There is no history or any murmur of aortic stenosis.  Recent cardiac

catheterization at [facility] several months ago showed patent stents with no

intervention required.  The LV function is known to be normal.  

 

I agree with holding antiplatelet therapy in this setting in case surgery is

required.  She no longer needs Plavix as her PCI was greater than 5 years ago.  

 

Thank you for the consultation.  

 

 

RADHA KILGORE M.D.

 

[id number]

DD: 04/25/2017 09:09

DT: 04/25/2017 09:57

Job #:  [id number]

## 2017-04-25 NOTE — CON.GI
Consult


Consult Specialty:: gastroenterology


Referred by:: Dr Filippo Serrano/Jamar





- History of Present Illness


History of Present Illness: 


[age] y/o female was doing well until 1 day prior to admission when she developed 

progressive and persistent LLQ associated with nausea and vomiting. This 

evening she was feeling better.





- History Source


History Provided By: Patient





- Past Medical History


Cardio/Vascular: Yes: CAD, HTN, Hyperlipdemia


Gastrointestinal: Yes: Diverticulitis, GERD


...Pregnant: No


Psych: Yes: Depression





- Alcohol/Substance Use


Hx Alcohol Use: No





- Smoking History


Smoking history: Former smoker


Have you smoked in the past 12 months: No


If you are a former smoker, when did you quit?: 2008





Home Medications





- Allergies


Allergies/Adverse Reactions: 


 Allergies











Allergy/AdvReac Type Severity Reaction Status Date / Time


 


clarithromycin [From Biaxin] Allergy   Verified 04/24/17 04:27


 


codeine Allergy   Verified 04/24/17 04:27


 


Penicillins Allergy   Verified 04/24/17 04:27














- Home Medications


Home Medications: 


Ambulatory Orders





Acetaminophen [Tylenol -] 1,000 mg PO Q6H 04/24/17 


Albuterol Sulfate [Proair Respiclick] 90 mcg IH QID 04/24/17 


Ascorbic Acid [Vitamin C -] 1 tab PO ASDIR 04/24/17 


Aspirin Coated [Ecotrin -] 81 mg PO DAILY 04/24/17 


Bupropion HCl [Bupropion Xl] 150 mg PO DAILY 04/24/17 


Clopidogrel Bisulfate [Plavix -] 75 mg PO DAILY 04/24/17 


Clotrimazole/Betamethasone Dip [Clotrimazole-Betamethasone Lot] 1 applic TD 

ASDIR 04/24/17 


Esomeprazole Magnesium [Nexium 24Hr] 40 mg PO DAILY 04/24/17 


Furosemide [Lasix -] 20 mg PO Q2D 04/24/17 


Lipase/Protease/Amylase [Creon Dr 36,000 Units Capsule] 1 each PO AC 04/24/17 


Loperamide HCl [Loperamide] 2 mg PO Q6H PRN 04/24/17 


Melatonin 5 mg PO HS 04/24/17 


Simvastatin [Zocor -] 40 mg PO HS 04/24/17 











Review of Systems





- Review of Systems


Constitutional: denies: Fever


Eyes: denies: Blurred Vision


HENT: denies: Difficult Swallowing


Neck: denies: Decreased ROM


Respiratory: denies: Cough


Gastrointestinal: reports: Abdominal Pain.  denies: Constipation, Diarrhea, 

Dysphagia, Melena, Rectal Bleeding, Vomiting Blood





Physical Exam-GI


Vital Signs: 


 Vital Signs











Temperature  98.3 F   04/25/17 14:49


 


Pulse Rate  79   04/25/17 14:49


 


Respiratory Rate  20   04/25/17 08:00


 


Blood Pressure  130/67   04/25/17 14:49


 


O2 Sat by Pulse Oximetry (%)  97   04/25/17 09:00











Constitutional: Yes: Obese


Eyes: Yes: Conjunctiva Clear


HENT: Yes: Atraumatic


Neck: Yes: Trachea Midline


Cardiovascular: Yes: Regular Rate and Rhythm


Respiratory: Yes: CTA Bilaterally


...Palpate: Yes: Soft, Tenderness (--llq).  No: Firm/Rigid, Guarding, Mass, 

Splenomegaly


Labs: 


 CBC, BMP





 04/25/17 06:35 





 04/25/17 06:35 





 INR, PTT











INR  1.05  (0.82-1.09)   04/24/17  05:05    














Imaging





- Results


Cat Scan: Report Reviewed, Image Reviewed





Problem List





- Problems


(1) Diverticulitis


Assessment/Plan: 


--continue antibiotics


IV hydration repeat CT in 48 hours


Code(s): K57.92 - DVTRCLI OF INTEST, PART UNSP, W/O PERF OR ABSCESS W/O BLEED   

Qualifiers: 


     Diverticulitis site: large intestine     Diverticulitis bleeding: without 

bleeding     Diverticulitis complication: with perforation     Qualified Code(s)

: K57.20 - Diverticulitis of large intestine with perforation and abscess 

without bleeding

## 2017-04-25 NOTE — PN
Progress Note, Physician





- Current Medication List


Current Medications: 


Active Medications





Ceftriaxone Sodium (Rocephin 1gm Ivpb (Pre-Docked))  1 gm IVPB DAILY [facility]


   PRN Reason: Protocol


   Last Admin: 04/25/17 09:40 Dose:  1 gm


Heparin Sodium (Porcine) (Heparin -)  5,000 unit SQ BID [facility]


   Last Admin: 04/25/17 21:33 Dose:  5,000 unit


Metronidazole (Flagyl 500mg Premixed Ivpb -)  100 mls @ 100 mls/hr IVPB Q6H-IV 

[facility]


   Last Admin: 04/25/17 21:34 Dose:  100 mls/hr


Pantoprazole Sodium (Protonix 40mg Ivpb (Pre-Docked))  100 mls @ 200 mls/hr 

IVPB BID [facility]


   Last Admin: 04/25/17 21:33 Dose:  200 mls/hr


Sodium Chloride (Normal Saline -)  1,000 mls @ 140 mls/hr IV ASDIR [facility]


   Last Admin: 04/25/17 20:50 Dose:  140 mls/hr


Mirtazapine (Remeron -)  15 mg PO HS [facility]


   Last Admin: 04/25/17 21:33 Dose:  15 mg


Morphine Sulfate (Morphine Injection -)  4 mg IVPUSH Q4H PRN


   PRN Reason: PAIN


   Last Admin: 04/25/17 22:12 Dose:  4 mg











- Objective


Vital Signs: 


 Vital Signs











Temperature  98.6 F   04/25/17 21:25


 


Pulse Rate  80   04/25/17 21:25


 


Respiratory Rate  20   04/25/17 21:25


 


Blood Pressure  145/56   04/25/17 21:25


 


O2 Sat by Pulse Oximetry (%)  97   04/25/17 09:00











Labs: 


 CBC, BMP





 04/25/17 06:35 





 04/25/17 06:35 





 INR, PTT











INR  1.05  (0.82-1.09)   04/24/17  05:05    














Problem List





- Problems


(1) Diverticulitis


Code(s): K57.92 - DVTRCLI OF INTEST, PART UNSP, W/O PERF OR ABSCESS W/O BLEED   

Qualifiers: 


     Diverticulitis site: large intestine     Diverticulitis bleeding: without 

bleeding     Diverticulitis complication: with perforation     Qualified Code(s)

: K57.20 - Diverticulitis of large intestine with perforation and abscess 

without bleeding  





(2) HTN (hypertension)


Code(s): I10 - ESSENTIAL (PRIMARY) HYPERTENSION   





(3) CAD (coronary artery disease)


Code(s): I25.10 - ATHSCL HEART DISEASE OF NATIVE CORONARY ARTERY W/O ANG PCTRS 

  





(4) HLD (hyperlipidemia)


Code(s): E78.5 - HYPERLIPIDEMIA, UNSPECIFIED   





(5) Depression


Code(s): F32.9 - MAJOR DEPRESSIVE DISORDER, SINGLE EPISODE, UNSPECIFIED

## 2017-04-26 LAB
BASOPHILS # BLD: 0.4 % (ref 0–2)
DEPRECATED RDW RBC AUTO: 13.8 % (ref 11.6–15.6)
EOSINOPHIL # BLD: 1.1 % (ref 0–4.5)
MCH RBC QN AUTO: 28.1 PG (ref 25.7–33.7)
MCHC RBC AUTO-ENTMCNC: 33.1 G/DL (ref 32–36)
MCV RBC: 84.9 FL (ref 80–96)
NEUTROPHILS # BLD: 67.9 % (ref 42.8–82.8)
PLATELET # BLD AUTO: 198 K/MM3 (ref 134–434)
PMV BLD: 8 FL (ref 7.5–11.1)
WBC # BLD AUTO: 9.4 K/MM3 (ref 4–10)

## 2017-04-26 RX ADMIN — METRONIDAZOLE SCH MLS/HR: 500 INJECTION, SOLUTION INTRAVENOUS at 14:28

## 2017-04-26 RX ADMIN — METRONIDAZOLE SCH MLS/HR: 500 INJECTION, SOLUTION INTRAVENOUS at 20:30

## 2017-04-26 RX ADMIN — HEPARIN SODIUM SCH UNIT: 5000 INJECTION, SOLUTION INTRAVENOUS; SUBCUTANEOUS at 21:40

## 2017-04-26 RX ADMIN — SODIUM CHLORIDE SCH MLS/HR: 9 INJECTION, SOLUTION INTRAVENOUS at 06:36

## 2017-04-26 RX ADMIN — HEPARIN SODIUM SCH UNIT: 5000 INJECTION, SOLUTION INTRAVENOUS; SUBCUTANEOUS at 09:38

## 2017-04-26 RX ADMIN — MORPHINE SULFATE PRN MG: 4 INJECTION, SOLUTION INTRAMUSCULAR; INTRAVENOUS at 21:40

## 2017-04-26 RX ADMIN — CEFTRIAXONE SCH GM: 1 INJECTION, POWDER, FOR SOLUTION INTRAMUSCULAR; INTRAVENOUS at 09:38

## 2017-04-26 RX ADMIN — METRONIDAZOLE SCH MLS/HR: 500 INJECTION, SOLUTION INTRAVENOUS at 02:24

## 2017-04-26 RX ADMIN — MORPHINE SULFATE PRN MG: 4 INJECTION, SOLUTION INTRAMUSCULAR; INTRAVENOUS at 10:29

## 2017-04-26 RX ADMIN — SODIUM CHLORIDE SCH MLS/HR: 9 INJECTION, SOLUTION INTRAVENOUS at 17:41

## 2017-04-26 RX ADMIN — PANTOPRAZOLE SODIUM SCH MLS/HR: 40 INJECTION, POWDER, FOR SOLUTION INTRAVENOUS at 21:40

## 2017-04-26 RX ADMIN — MORPHINE SULFATE PRN MG: 4 INJECTION, SOLUTION INTRAMUSCULAR; INTRAVENOUS at 17:48

## 2017-04-26 RX ADMIN — METRONIDAZOLE SCH MLS/HR: 500 INJECTION, SOLUTION INTRAVENOUS at 08:43

## 2017-04-26 RX ADMIN — MORPHINE SULFATE PRN MG: 4 INJECTION, SOLUTION INTRAMUSCULAR; INTRAVENOUS at 02:25

## 2017-04-26 RX ADMIN — PANTOPRAZOLE SODIUM SCH MLS/HR: 40 INJECTION, POWDER, FOR SOLUTION INTRAVENOUS at 09:37

## 2017-04-26 RX ADMIN — MORPHINE SULFATE PRN MG: 4 INJECTION, SOLUTION INTRAMUSCULAR; INTRAVENOUS at 06:37

## 2017-04-26 NOTE — PN
Progress Note, Physician


Chief Complaint: 


passing flatus


having intermittent abdominal pain





Echo reviewed: normal LV function





- Current Medication List


Current Medications: 


Active Medications





Ceftriaxone Sodium (Rocephin 1gm Ivpb (Pre-Docked))  1 gm IVPB DAILY [facility]


   PRN Reason: Protocol


   Last Admin: 04/25/17 09:40 Dose:  1 gm


Heparin Sodium (Porcine) (Heparin -)  5,000 unit SQ BID [facility]


   Last Admin: 04/25/17 21:33 Dose:  5,000 unit


Metronidazole (Flagyl 500mg Premixed Ivpb -)  100 mls @ 100 mls/hr IVPB Q6H-IV 

[facility]


   Last Admin: 04/26/17 08:43 Dose:  100 mls/hr


Pantoprazole Sodium (Protonix 40mg Ivpb (Pre-Docked))  100 mls @ 200 mls/hr 

IVPB BID [facility]


   Last Admin: 04/25/17 21:33 Dose:  200 mls/hr


Sodium Chloride (Normal Saline -)  1,000 mls @ 140 mls/hr IV ASDIR [facility]


   Last Admin: 04/26/17 06:36 Dose:  140 mls/hr


Mirtazapine (Remeron -)  15 mg PO HS [facility]


   Last Admin: 04/25/17 21:33 Dose:  15 mg


Morphine Sulfate (Morphine Injection -)  4 mg IVPUSH Q4H PRN


   PRN Reason: PAIN


   Last Admin: 04/26/17 06:37 Dose:  4 mg











- Objective


Vital Signs: 


 Vital Signs











Temperature  98.9 F   04/26/17 07:40


 


Pulse Rate  69   04/26/17 07:40


 


Respiratory Rate  18   04/26/17 07:40


 


Blood Pressure  155/69   04/26/17 07:40


 


O2 Sat by Pulse Oximetry (%)  97   04/25/17 21:00











Constitutional: Yes: No Distress


Cardiovascular: Yes: Regular Rate and Rhythm


Respiratory: Yes: CTA Bilaterally (no rales or wheezing.)


Gastrointestinal: Yes: Soft (diffusely tender, no rebound on my exam)


Edema: No


Neurological: Yes: Alert


Labs: 


 CBC, BMP





 04/25/17 06:35 





 04/25/17 06:35 





 INR, PTT











INR  1.05  (0.82-1.09)   04/24/17  05:05    








 Laboratory Tests











  04/25/17 04/25/17





  06:35 06:35


 


WBC  9.7 


 


Hct  34.0 


 


Plt Count  175 


 


Sodium   144


 


Potassium   3.9


 


Creatinine   0.9














Assessment/Plan


Diverticulitis


Pneumoperitoneum


CAD s/p PCI


PHTN





REC:


If urgent surgery is required, there are no cardiac contraindications to 

proceed.


She is in NSR and is euvolemic with no history of recent angina.


No murmur of AS.


Recent cath at [facility] with patent stents.


LV function normal-- PHTN noted, likely due to COPD, obesity and probable TAYLA. 





Continue to  hold antiplatelet Rx in this setting- overall risk is low as PCI 

was > 3 years ago.

## 2017-04-26 NOTE — PN
GI Progress Note


Subjective: 


still with abdominal pain, did not respond well with Remeron,will switch back 

to Wellbutrin





- Objective


Vital Signs: 


 Vital Signs











Temperature  98.7 F   04/26/17 14:40


 


Pulse Rate  82   04/26/17 14:40


 


Respiratory Rate  18   04/26/17 07:40


 


Blood Pressure  139/71   04/26/17 14:40


 


O2 Sat by Pulse Oximetry (%)  98   04/26/17 09:00











Constitutional: Well Nourished


Eyes: Yes: Conjunctiva Clear


HENT: Yes: Atraumatic


Neck: Yes: Supple


Cardiovascular: Yes: Regular Rate and Rhythm


Respiratory: Yes: CTA Bilaterally


...Palpate: Yes: Soft, Tenderness (--llq same , no change from yesterday)


Labs: 


 CBC, BMP





 04/26/17 10:22 





 04/25/17 06:35 





 INR, PTT











INR  1.05  (0.82-1.09)   04/24/17  05:05    














Problem List





- Problems


(1) Diverticulitis


Assessment/Plan: 


with possible perforation


R> will need repeat ct in 2 days as per surgery


continue antibiotics


Code(s): K57.92 - DVTRCLI OF INTEST, PART UNSP, W/O PERF OR ABSCESS W/O BLEED   

Qualifiers: 


     Diverticulitis site: large intestine     Diverticulitis bleeding: without 

bleeding     Diverticulitis complication: with perforation     Qualified Code(s)

: K57.20 - Diverticulitis of large intestine with perforation and abscess 

without bleeding

## 2017-04-26 NOTE — PN
Progress Note (short form)





- Note


Progress Note: 


surgery





pt seen and examined.  complains of more pain today.  wants to be woken up at 

night to get medication.





afebrile, hr 69, 159/69





abd- soft, mild distension, moderate llq and rlq tenderness.  upper abd nt





Plan- presumed complicated diverticulitis.  subjectively worse today.  will 

repeat cbc.  no fever.  normal wbc yesterday.  will hold off on repeat ct since 

had one 2 days ago.  clinically non toxic despite complaints.  Pt does not want 

exploratory surgery.

## 2017-04-26 NOTE — PN
Progress Note, Physician


History of Present Illness: 


Pt had abdominal pain during night


However today pt was lethargic all day





- Current Medication List


Current Medications: 


Active Medications





Ceftriaxone Sodium (Rocephin 1gm Ivpb (Pre-Docked))  1 gm IVPB DAILY [facility]


   PRN Reason: Protocol


   Last Admin: 04/26/17 09:38 Dose:  1 gm


Heparin Sodium (Porcine) (Heparin -)  5,000 unit SQ BID [facility]


   Last Admin: 04/26/17 21:40 Dose:  5,000 unit


Metronidazole (Flagyl 500mg Premixed Ivpb -)  100 mls @ 100 mls/hr IVPB Q6H-IV 

[facility]


   Last Admin: 04/26/17 20:30 Dose:  100 mls/hr


Pantoprazole Sodium (Protonix 40mg Ivpb (Pre-Docked))  100 mls @ 200 mls/hr 

IVPB BID [facility]


   Last Admin: 04/26/17 21:40 Dose:  200 mls/hr


Sodium Chloride (Normal Saline -)  1,000 mls @ 140 mls/hr IV ASDIR [facility]


   Last Admin: 04/26/17 17:41 Dose:  140 mls/hr


Lorazepam (Ativan Injection -)  0.5 mg IVPUSH DAILY [facility]


   PRN Reason: ANXIETY


Morphine Sulfate (Morphine Injection -)  4 mg IVPUSH Q4H PRN


   PRN Reason: PAIN











- Objective


Vital Signs: 


 Vital Signs











Temperature  98.6 F   04/26/17 20:27


 


Pulse Rate  75   04/26/17 20:27


 


Respiratory Rate  18   04/26/17 20:27


 


Blood Pressure  145/77   04/26/17 20:27


 


O2 Sat by Pulse Oximetry (%)  98   04/26/17 09:00











HENT: Yes: WNL


Neck: Yes: Supple


Cardiovascular: Yes: WNL, Regular Rate and Rhythm


Respiratory: Yes: WNL, Regular, CTA Bilaterally


Gastrointestinal: Yes: Other ((+) generalized tenderness (-) guarding/rebound)


Labs: 


 CBC, BMP





 04/26/17 10:22 





 04/25/17 06:35 





 INR, PTT











INR  1.05  (0.82-1.09)   04/24/17  05:05    














Problem List





- Problems


(1) Diverticulitis


Assessment/Plan: 


Acute diverticulitis w/ pneumoperitoneum


Cont IV antibxs/fluids


Monitor labs


Cont IV morphine


Code(s): K57.92 - DVTRCLI OF INTEST, PART UNSP, W/O PERF OR ABSCESS W/O BLEED   

Qualifiers: 


     Diverticulitis site: large intestine     Diverticulitis bleeding: without 

bleeding     Diverticulitis complication: with perforation        Qualified Code

(s): K57.20 - Diverticulitis of large intestine with perforation and abscess 

without bleeding  





(2) HTN (hypertension)


Assessment/Plan: 


BP meds on hold





Code(s): I10 - ESSENTIAL (PRIMARY) HYPERTENSION   





(3) CAD (coronary artery disease)


Assessment/Plan: 


Plavix on hold due to fact pt may need surgical intervention


As per cardio no need to restart plavix


Code(s): I25.10 - ATHSCL HEART DISEASE OF NATIVE CORONARY ARTERY W/O ANG PCTRS 

  





(4) HLD (hyperlipidemia)


Code(s): E78.5 - HYPERLIPIDEMIA, UNSPECIFIED   





(5) Depression


Code(s): F32.9 - MAJOR DEPRESSIVE DISORDER, SINGLE EPISODE, UNSPECIFIED

## 2017-04-27 LAB
BASOPHILS # BLD: 0.5 % (ref 0–2)
DEPRECATED RDW RBC AUTO: 13.6 % (ref 11.6–15.6)
EOSINOPHIL # BLD: 1.4 % (ref 0–4.5)
MCH RBC QN AUTO: 28.3 PG (ref 25.7–33.7)
MCHC RBC AUTO-ENTMCNC: 33.2 G/DL (ref 32–36)
MCV RBC: 85.1 FL (ref 80–96)
NEUTROPHILS # BLD: 66.8 % (ref 42.8–82.8)
PLATELET # BLD AUTO: 185 K/MM3 (ref 134–434)
PMV BLD: 7.7 FL (ref 7.5–11.1)
WBC # BLD AUTO: 7.5 K/MM3 (ref 4–10)

## 2017-04-27 RX ADMIN — HEPARIN SODIUM SCH UNIT: 5000 INJECTION, SOLUTION INTRAVENOUS; SUBCUTANEOUS at 10:04

## 2017-04-27 RX ADMIN — MORPHINE SULFATE PRN MG: 4 INJECTION, SOLUTION INTRAMUSCULAR; INTRAVENOUS at 01:51

## 2017-04-27 RX ADMIN — CEFTRIAXONE SCH GM: 1 INJECTION, POWDER, FOR SOLUTION INTRAMUSCULAR; INTRAVENOUS at 10:04

## 2017-04-27 RX ADMIN — HEPARIN SODIUM SCH UNIT: 5000 INJECTION, SOLUTION INTRAVENOUS; SUBCUTANEOUS at 21:54

## 2017-04-27 RX ADMIN — SODIUM CHLORIDE SCH MLS/HR: 9 INJECTION, SOLUTION INTRAVENOUS at 03:50

## 2017-04-27 RX ADMIN — MORPHINE SULFATE PRN MG: 4 INJECTION, SOLUTION INTRAMUSCULAR; INTRAVENOUS at 05:52

## 2017-04-27 RX ADMIN — METRONIDAZOLE SCH MLS/HR: 500 INJECTION, SOLUTION INTRAVENOUS at 09:29

## 2017-04-27 RX ADMIN — PANTOPRAZOLE SODIUM SCH MLS/HR: 40 INJECTION, POWDER, FOR SOLUTION INTRAVENOUS at 10:04

## 2017-04-27 RX ADMIN — METRONIDAZOLE SCH MLS/HR: 500 INJECTION, SOLUTION INTRAVENOUS at 02:06

## 2017-04-27 RX ADMIN — METRONIDAZOLE SCH MLS/HR: 500 INJECTION, SOLUTION INTRAVENOUS at 21:48

## 2017-04-27 RX ADMIN — SODIUM CHLORIDE SCH MLS/HR: 9 INJECTION, SOLUTION INTRAVENOUS at 21:48

## 2017-04-27 RX ADMIN — MORPHINE SULFATE PRN MG: 4 INJECTION, SOLUTION INTRAMUSCULAR; INTRAVENOUS at 19:40

## 2017-04-27 RX ADMIN — PANTOPRAZOLE SODIUM SCH MLS/HR: 40 INJECTION, POWDER, FOR SOLUTION INTRAVENOUS at 21:48

## 2017-04-27 RX ADMIN — SODIUM CHLORIDE SCH MLS/HR: 9 INJECTION, SOLUTION INTRAVENOUS at 12:12

## 2017-04-27 RX ADMIN — MORPHINE SULFATE PRN MG: 4 INJECTION, SOLUTION INTRAMUSCULAR; INTRAVENOUS at 10:03

## 2017-04-27 RX ADMIN — METRONIDAZOLE SCH MLS/HR: 500 INJECTION, SOLUTION INTRAVENOUS at 14:38

## 2017-04-27 NOTE — PN
Progress Note, Physician


Chief Complaint: 


no distress


Denies chest pain or SOB





- Current Medication List


Current Medications: 


Active Medications





Ceftriaxone Sodium (Rocephin 1gm Ivpb (Pre-Docked))  1 gm IVPB DAILY [facility]


   PRN Reason: Protocol


   Last Admin: 04/26/17 09:38 Dose:  1 gm


Heparin Sodium (Porcine) (Heparin -)  5,000 unit SQ BID [facility]


   Last Admin: 04/26/17 21:40 Dose:  5,000 unit


Metronidazole (Flagyl 500mg Premixed Ivpb -)  100 mls @ 100 mls/hr IVPB Q6H-IV 

[facility]


   Last Admin: 04/27/17 09:29 Dose:  100 mls/hr


Pantoprazole Sodium (Protonix 40mg Ivpb (Pre-Docked))  100 mls @ 200 mls/hr 

IVPB BID [facility]


   Last Admin: 04/26/17 21:40 Dose:  200 mls/hr


Sodium Chloride (Normal Saline -)  1,000 mls @ 140 mls/hr IV ASDIR [facility]


   Last Admin: 04/27/17 03:50 Dose:  140 mls/hr


Lorazepam (Ativan Injection -)  0.5 mg IVPUSH DAILY [facility]


   PRN Reason: ANXIETY


Morphine Sulfate (Morphine Injection -)  4 mg IVPUSH Q4H PRN


   PRN Reason: PAIN


   Last Admin: 04/27/17 05:52 Dose:  4 mg











- Objective


Vital Signs: 


 Vital Signs











Temperature  99.5 F   04/27/17 08:13


 


Pulse Rate  78   04/27/17 08:13


 


Respiratory Rate  18   04/27/17 08:13


 


Blood Pressure  146/94   04/27/17 08:13


 


O2 Sat by Pulse Oximetry (%)  98   04/26/17 09:00











Constitutional: Yes: No Distress


Eyes: Yes: Conjunctiva Clear


Cardiovascular: Yes: Regular Rate and Rhythm


Respiratory: Yes: CTA Bilaterally


Gastrointestinal: Yes: Soft (soft, no rebound or guarding.)


Edema: No


Neurological: Yes: Alert


...Motor Strength: WNL


Labs: 


 CBC, BMP





 04/27/17 07:05 





 04/25/17 06:35 





 INR, PTT











INR  1.05  (0.82-1.09)   04/24/17  05:05    














Assessment/Plan


Diverticulitis


Pneumoperitoneum


CAD s/p PCI


PHTN





REC:


If urgent surgery is required, there are no cardiac contraindications to 

proceed.





Recent cath at [facility] with patent stents.


LV function normal-- PHTN noted, likely due to COPD, obesity and probable TAYLA. 





Continue to  hold antiplatelet Rx in this setting- overall risk is low as PCI 

was > 3 years ago.

## 2017-04-27 NOTE — PN
Progress Note (short form)





- Note


Progress Note: 


surgery





pt seen and examined.  pain better today but still present.   Very hungry.  

admits to flatus.








afebrile





abd- soft, mild distension, mild suprapubic tenderness





 Laboratory Tests











  04/25/17





  06:35


 


WBC  9.7














Plan- presumed complicated diverticulitis.  better today without objective 

evidence of cont problem except mild suprapubic tenderness.  vitals stable with 

normal labs and pt asking for food.  Will get CT tomorrow to r/o collection.  

further recs based on CT.

## 2017-04-27 NOTE — PN
GI Progress Note


Subjective: 


abdominal pain less, very hungry, afebrile





- Objective


Vital Signs: 


 Vital Signs











Temperature  98.1 F   04/27/17 14:00


 


Pulse Rate  83   04/27/17 14:00


 


Respiratory Rate  18   04/27/17 14:00


 


Blood Pressure  158/93   04/27/17 14:00


 


O2 Sat by Pulse Oximetry (%)  98   04/27/17 09:00











Constitutional: Obese


Eyes: Yes: Conjunctiva Clear, Occular Prosthesis


Neck: Yes: Supple


Cardiovascular: Yes: Regular Rate and Rhythm


Respiratory: Yes: CTA Bilaterally


...Palpate: Yes: Soft.  No: Firm/Rigid, Guarding, Hepatomegaly, Pulsatile Mass, 

Splenomegaly, Tenderness


Labs: 


 CBC, BMP





 04/27/17 07:05 





 04/25/17 06:35 





 INR, PTT











INR  1.05  (0.82-1.09)   04/24/17  05:05    














Problem List





- Problems


(1) Diverticulitis


Assessment/Plan: 


--resolving


R> await ct results


Code(s): K57.92 - DVTRCLI OF INTEST, PART UNSP, W/O PERF OR ABSCESS W/O BLEED   

Qualifiers: 


     Diverticulitis site: large intestine     Diverticulitis bleeding: without 

bleeding     Diverticulitis complication: with perforation        Qualified Code

(s): K57.20 - Diverticulitis of large intestine with perforation and abscess 

without bleeding

## 2017-04-27 NOTE — PN
Progress Note, Physician





- Current Medication List


Current Medications: 


Active Medications





Ceftriaxone Sodium (Rocephin 1gm Ivpb (Pre-Docked))  1 gm IVPB DAILY [facility]


   PRN Reason: Protocol


   Last Admin: 04/27/17 10:04 Dose:  1 gm


Heparin Sodium (Porcine) (Heparin -)  5,000 unit SQ BID [facility]


   Last Admin: 04/27/17 10:04 Dose:  5,000 unit


Metronidazole (Flagyl 500mg Premixed Ivpb -)  100 mls @ 100 mls/hr IVPB Q6H-IV 

[facility]


   Last Admin: 04/27/17 14:38 Dose:  100 mls/hr


Pantoprazole Sodium (Protonix 40mg Ivpb (Pre-Docked))  100 mls @ 200 mls/hr 

IVPB BID [facility]


   Last Admin: 04/27/17 10:04 Dose:  200 mls/hr


Sodium Chloride (Normal Saline -)  1,000 mls @ 140 mls/hr IV ASDIR [facility]


   Last Admin: 04/27/17 12:12 Dose:  140 mls/hr


Lorazepam (Ativan Injection -)  0.5 mg IVPUSH DAILY [facility]


   PRN Reason: ANXIETY


Morphine Sulfate (Morphine Injection -)  4 mg IVPUSH Q4H [facility]N


   PRN Reason: PAIN


   Last Admin: 04/27/17 19:40 Dose:  4 mg











- Objective


Vital Signs: 


 Vital Signs











Temperature  98.1 F   04/27/17 14:00


 


Pulse Rate  83   04/27/17 14:00


 


Respiratory Rate  18   04/27/17 14:00


 


Blood Pressure  158/93   04/27/17 14:00


 


O2 Sat by Pulse Oximetry (%)  98   04/27/17 09:00











Labs: 


 CBC, BMP





 04/27/17 07:05 





 04/25/17 06:35 





 INR, PTT











INR  1.05  (0.82-1.09)   04/24/17  05:05    














Problem List





- Problems


(1) Diverticulitis


Code(s): K57.92 - DVTRCLI OF INTEST, PART UNSP, W/O PERF OR ABSCESS W/O BLEED   

Qualifiers: 


     Diverticulitis site: large intestine     Diverticulitis bleeding: without 

bleeding     Diverticulitis complication: with perforation        Qualified Code

(s): K57.20 - Diverticulitis of large intestine with perforation and abscess 

without bleeding  





(2) HTN (hypertension)


Code(s): I10 - ESSENTIAL (PRIMARY) HYPERTENSION   





(3) CAD (coronary artery disease)


Code(s): I25.10 - ATHSCL HEART DISEASE OF NATIVE CORONARY ARTERY W/O ANG PCTRS 

  





(4) HLD (hyperlipidemia)


Code(s): E78.5 - HYPERLIPIDEMIA, UNSPECIFIED   





(5) Depression


Code(s): F32.9 - MAJOR DEPRESSIVE DISORDER, SINGLE EPISODE, UNSPECIFIED

## 2017-04-28 RX ADMIN — MORPHINE SULFATE PRN MG: 4 INJECTION, SOLUTION INTRAMUSCULAR; INTRAVENOUS at 08:33

## 2017-04-28 RX ADMIN — SODIUM CHLORIDE SCH MLS/HR: 9 INJECTION, SOLUTION INTRAVENOUS at 14:43

## 2017-04-28 RX ADMIN — PANTOPRAZOLE SODIUM SCH MLS/HR: 40 INJECTION, POWDER, FOR SOLUTION INTRAVENOUS at 23:22

## 2017-04-28 RX ADMIN — METRONIDAZOLE SCH MLS/HR: 500 INJECTION, SOLUTION INTRAVENOUS at 03:50

## 2017-04-28 RX ADMIN — HEPARIN SODIUM SCH UNIT: 5000 INJECTION, SOLUTION INTRAVENOUS; SUBCUTANEOUS at 10:02

## 2017-04-28 RX ADMIN — MORPHINE SULFATE PRN MG: 4 INJECTION, SOLUTION INTRAMUSCULAR; INTRAVENOUS at 00:01

## 2017-04-28 RX ADMIN — METRONIDAZOLE SCH MLS/HR: 500 INJECTION, SOLUTION INTRAVENOUS at 14:42

## 2017-04-28 RX ADMIN — MORPHINE SULFATE PRN MG: 4 INJECTION, SOLUTION INTRAMUSCULAR; INTRAVENOUS at 04:05

## 2017-04-28 RX ADMIN — MIRTAZAPINE PRN MG: 15 TABLET, FILM COATED ORAL at 00:23

## 2017-04-28 RX ADMIN — HEPARIN SODIUM SCH UNIT: 5000 INJECTION, SOLUTION INTRAVENOUS; SUBCUTANEOUS at 21:21

## 2017-04-28 RX ADMIN — MORPHINE SULFATE PRN MG: 4 INJECTION, SOLUTION INTRAMUSCULAR; INTRAVENOUS at 18:23

## 2017-04-28 RX ADMIN — METRONIDAZOLE SCH MLS/HR: 500 INJECTION, SOLUTION INTRAVENOUS at 21:19

## 2017-04-28 RX ADMIN — CEFTRIAXONE SCH GM: 1 INJECTION, POWDER, FOR SOLUTION INTRAMUSCULAR; INTRAVENOUS at 11:03

## 2017-04-28 RX ADMIN — METRONIDAZOLE SCH MLS/HR: 500 INJECTION, SOLUTION INTRAVENOUS at 09:39

## 2017-04-28 RX ADMIN — PANTOPRAZOLE SODIUM SCH MLS/HR: 40 INJECTION, POWDER, FOR SOLUTION INTRAVENOUS at 10:30

## 2017-04-28 NOTE — PN
Progress Note, Physician





- Current Medication List


Current Medications: 


Active Medications





Ceftriaxone Sodium (Rocephin 1gm Ivpb (Pre-Docked))  1 gm IVPB DAILY [facility]


   PRN Reason: Protocol


   Last Admin: 04/28/17 11:03 Dose:  1 gm


Heparin Sodium (Porcine) (Heparin -)  5,000 unit SQ BID [facility]


   Last Admin: 04/28/17 10:02 Dose:  5,000 unit


Metronidazole (Flagyl 500mg Premixed Ivpb -)  100 mls @ 100 mls/hr IVPB Q6H-IV 

[facility]


   Last Admin: 04/28/17 14:42 Dose:  100 mls/hr


Pantoprazole Sodium (Protonix 40mg Ivpb (Pre-Docked))  100 mls @ 200 mls/hr 

IVPB BID [facility]


   Last Admin: 04/28/17 10:30 Dose:  200 mls/hr


Sodium Chloride (Normal Saline -)  1,000 mls @ 140 mls/hr IV ASDIR [facility]


   Last Admin: 04/28/17 14:43 Dose:  140 mls/hr


Lorazepam (Ativan Injection -)  0.5 mg IVPUSH DAILY [facility]


   PRN Reason: ANXIETY


Mirtazapine (Remeron -)  7.5 mg PO HS PRN


   PRN Reason: INSOMNIA


   Last Admin: 04/28/17 00:23 Dose:  7.5 mg


Morphine Sulfate (Morphine Injection -)  4 mg IVPUSH Q4H PRN


   PRN Reason: PAIN


   Last Admin: 04/28/17 18:23 Dose:  4 mg











- Objective


Vital Signs: 


 Vital Signs











Temperature  98.4 F   04/28/17 14:00


 


Pulse Rate  73   04/28/17 14:00


 


Respiratory Rate  18   04/28/17 08:13


 


Blood Pressure  146/74   04/28/17 14:00


 


O2 Sat by Pulse Oximetry (%)  98   04/28/17 09:00











Labs: 


 CBC, BMP





 04/27/17 07:05 





 04/25/17 06:35 





 INR, PTT











INR  1.05  (0.82-1.09)   04/24/17  05:05    














Problem List





- Problems


(1) Diverticulitis


Code(s): K57.92 - DVTRCLI OF INTEST, PART UNSP, W/O PERF OR ABSCESS W/O BLEED   

Qualifiers: 


     Diverticulitis site: large intestine     Diverticulitis bleeding: without 

bleeding     Diverticulitis complication: with perforation        Qualified Code

(s): K57.20 - Diverticulitis of large intestine with perforation and abscess 

without bleeding  





(2) HTN (hypertension)


Code(s): I10 - ESSENTIAL (PRIMARY) HYPERTENSION   





(3) CAD (coronary artery disease)


Code(s): I25.10 - ATHSCL HEART DISEASE OF NATIVE CORONARY ARTERY W/O ANG PCTRS 

  





(4) HLD (hyperlipidemia)


Code(s): E78.5 - HYPERLIPIDEMIA, UNSPECIFIED   





(5) Depression


Code(s): F32.9 - MAJOR DEPRESSIVE DISORDER, SINGLE EPISODE, UNSPECIFIED

## 2017-04-28 NOTE — PN
Progress Note (short form)





- Note


Progress Note: 


surgery





pt seen and examined.  pain better.  repeat ct shows no free air, no abscess, 

minimal inflammation








afebrile





abd- soft, no distension, mild suprapubic tenderness








 Laboratory Tests











  04/27/17





  07:05


 


WBC  7.5








 








Plan- presumed complicated diverticulitis.  doing well.  will start full 

liquids.  if tolerates, surgically stable for d/c tomorrrow on 1 week of oral 

levaquin/ flagyl.  would stay on liquids until Monday and then one week of low 

fiber and then can resume regular diet.  Pt can consider elective sigmoid 

colectomy after 6 weeks to prevent future recurrence.





[phone]

## 2017-04-28 NOTE — PN
Progress Note, Physician


Chief Complaint: 


CT scan with improvement





Denies chest pain





History of Present Illness: 


had a BM 





- Current Medication List


Current Medications: 


Active Medications





Ceftriaxone Sodium (Rocephin 1gm Ivpb (Pre-Docked))  1 gm IVPB DAILY [facility]


   PRN Reason: Protocol


   Last Admin: 04/27/17 10:04 Dose:  1 gm


Heparin Sodium (Porcine) (Heparin -)  5,000 unit SQ BID [facility]


   Last Admin: 04/28/17 10:02 Dose:  5,000 unit


Metronidazole (Flagyl 500mg Premixed Ivpb -)  100 mls @ 100 mls/hr IVPB Q6H-IV 

[facility]


   Last Admin: 04/28/17 09:39 Dose:  100 mls/hr


Pantoprazole Sodium (Protonix 40mg Ivpb (Pre-Docked))  100 mls @ 200 mls/hr 

IVPB BID [facility]


   Last Admin: 04/27/17 21:48 Dose:  200 mls/hr


Sodium Chloride (Normal Saline -)  1,000 mls @ 140 mls/hr IV ASDIR [facility]


   Last Admin: 04/27/17 21:48 Dose:  140 mls/hr


Lorazepam (Ativan Injection -)  0.5 mg IVPUSH DAILY [facility]


   PRN Reason: ANXIETY


Mirtazapine (Remeron -)  7.5 mg PO HS PRN


   PRN Reason: INSOMNIA


   Last Admin: 04/28/17 00:23 Dose:  7.5 mg


Morphine Sulfate (Morphine Injection -)  4 mg IVPUSH Q4H PRN


   PRN Reason: PAIN


   Last Admin: 04/28/17 08:33 Dose:  4 mg











- Objective


Vital Signs: 


 Vital Signs











Temperature  99.3 F   04/28/17 08:13


 


Pulse Rate  75   04/28/17 08:13


 


Respiratory Rate  18   04/28/17 08:13


 


Blood Pressure  155/83   04/28/17 08:13


 


O2 Sat by Pulse Oximetry (%)  98   04/27/17 22:00











Constitutional: Yes: No Distress, Calm


Cardiovascular: Yes: Regular Rate and Rhythm


Respiratory: Yes: CTA Bilaterally


Gastrointestinal: Yes: Soft (no rebound or guarding)


Edema: No


Neurological: Yes: Alert, Oriented


...Motor Strength: WNL


Labs: 


 CBC, BMP





 04/27/17 07:05 





 04/25/17 06:35 





 INR, PTT











INR  1.05  (0.82-1.09)   04/24/17  05:05    














Assessment/Plan


Diverticulitis


Pneumoperitoneum


CAD s/p PCI


PHTN





REC:


If urgent surgery is required, there are no cardiac contraindications to 

proceed. However, CT scan showing improvement and resolution of 

pneumoperitoneum.





Continue to  hold antiplatelet Rx in this setting- overall risk is low as PCI 

was > 3 years ago. 


Resume ASA 81mg daily when safe from surgical/GI standpoint.

## 2017-04-29 RX ADMIN — MORPHINE SULFATE PRN MG: 4 INJECTION, SOLUTION INTRAMUSCULAR; INTRAVENOUS at 04:11

## 2017-04-29 RX ADMIN — METRONIDAZOLE SCH MLS/HR: 500 INJECTION, SOLUTION INTRAVENOUS at 21:30

## 2017-04-29 RX ADMIN — METRONIDAZOLE SCH MLS/HR: 500 INJECTION, SOLUTION INTRAVENOUS at 03:01

## 2017-04-29 RX ADMIN — MORPHINE SULFATE PRN MG: 4 INJECTION, SOLUTION INTRAMUSCULAR; INTRAVENOUS at 14:29

## 2017-04-29 RX ADMIN — MORPHINE SULFATE PRN MG: 4 INJECTION, SOLUTION INTRAMUSCULAR; INTRAVENOUS at 00:05

## 2017-04-29 RX ADMIN — HEPARIN SODIUM SCH UNIT: 5000 INJECTION, SOLUTION INTRAVENOUS; SUBCUTANEOUS at 21:32

## 2017-04-29 RX ADMIN — METRONIDAZOLE SCH MLS/HR: 500 INJECTION, SOLUTION INTRAVENOUS at 14:20

## 2017-04-29 RX ADMIN — MIRTAZAPINE PRN MG: 15 TABLET, FILM COATED ORAL at 00:07

## 2017-04-29 RX ADMIN — METRONIDAZOLE SCH MLS/HR: 500 INJECTION, SOLUTION INTRAVENOUS at 08:51

## 2017-04-29 RX ADMIN — SODIUM CHLORIDE SCH MLS/HR: 9 INJECTION, SOLUTION INTRAVENOUS at 10:00

## 2017-04-29 RX ADMIN — PANTOPRAZOLE SODIUM SCH MLS/HR: 40 INJECTION, POWDER, FOR SOLUTION INTRAVENOUS at 10:03

## 2017-04-29 RX ADMIN — CEFTRIAXONE SCH GM: 1 INJECTION, POWDER, FOR SOLUTION INTRAMUSCULAR; INTRAVENOUS at 10:03

## 2017-04-29 RX ADMIN — PANTOPRAZOLE SODIUM SCH MLS/HR: 40 INJECTION, POWDER, FOR SOLUTION INTRAVENOUS at 21:30

## 2017-04-29 RX ADMIN — SODIUM CHLORIDE SCH MLS/HR: 9 INJECTION, SOLUTION INTRAVENOUS at 23:01

## 2017-04-29 RX ADMIN — MORPHINE SULFATE PRN MG: 4 INJECTION, SOLUTION INTRAMUSCULAR; INTRAVENOUS at 21:28

## 2017-04-29 RX ADMIN — ASPIRIN SCH MG: 81 TABLET, COATED ORAL at 10:58

## 2017-04-29 RX ADMIN — HEPARIN SODIUM SCH UNIT: 5000 INJECTION, SOLUTION INTRAVENOUS; SUBCUTANEOUS at 10:04

## 2017-04-29 RX ADMIN — MIRTAZAPINE PRN MG: 15 TABLET, FILM COATED ORAL at 21:29

## 2017-04-29 RX ADMIN — MORPHINE SULFATE PRN MG: 4 INJECTION, SOLUTION INTRAMUSCULAR; INTRAVENOUS at 08:51

## 2017-04-29 NOTE — PN
Progress Note, Physician





- Current Medication List


Current Medications: 


Active Medications





Aspirin (Ecotrin -)  81 mg PO DAILY [facility]


   Last Admin: 04/29/17 10:58 Dose:  81 mg


Ceftriaxone Sodium (Rocephin 1gm Ivpb (Pre-Docked))  1 gm IVPB DAILY [facility]


   PRN Reason: Protocol


   Last Admin: 04/29/17 10:03 Dose:  1 gm


Heparin Sodium (Porcine) (Heparin -)  5,000 unit SQ BID [facility]


   Last Admin: 04/29/17 10:04 Dose:  5,000 unit


Metronidazole (Flagyl 500mg Premixed Ivpb -)  100 mls @ 100 mls/hr IVPB Q6H-IV 

[facility]


   Last Admin: 04/29/17 14:20 Dose:  100 mls/hr


Pantoprazole Sodium (Protonix 40mg Ivpb (Pre-Docked))  100 mls @ 200 mls/hr 

IVPB BID [facility]


   Last Admin: 04/29/17 10:03 Dose:  200 mls/hr


Sodium Chloride (Normal Saline -)  1,000 mls @ 140 mls/hr IV ASDIR [facility]


   Last Admin: 04/29/17 10:00 Dose:  140 mls/hr


Lorazepam (Ativan Injection -)  0.5 mg IVPUSH DAILY [facility]


   PRN Reason: ANXIETY


Mirtazapine (Remeron -)  7.5 mg PO HS PRN


   PRN Reason: INSOMNIA


   Last Admin: 04/29/17 00:07 Dose:  7.5 mg


Morphine Sulfate (Morphine Injection -)  4 mg IVPUSH Q4H PRN


   PRN Reason: PAIN


   Last Admin: 04/29/17 14:29 Dose:  4 mg











- Objective


Vital Signs: 


 Vital Signs











Temperature  99.0 F   04/29/17 13:53


 


Pulse Rate  85   04/29/17 13:53


 


Respiratory Rate  18   04/29/17 08:00


 


Blood Pressure  114/80   04/29/17 13:53


 


O2 Sat by Pulse Oximetry (%)  97   04/29/17 09:00











Labs: 


 CBC, BMP





 04/27/17 07:05 





 04/25/17 06:35 





 INR, PTT











INR  1.05  (0.82-1.09)   04/24/17  05:05    














Problem List





- Problems


(1) Diverticulitis


Code(s): K57.92 - DVTRCLI OF INTEST, PART UNSP, W/O PERF OR ABSCESS W/O BLEED   

Qualifiers: 


     Diverticulitis site: large intestine     Diverticulitis bleeding: without 

bleeding     Diverticulitis complication: with perforation        Qualified Code

(s): K57.20 - Diverticulitis of large intestine with perforation and abscess 

without bleeding  





(2) HTN (hypertension)


Code(s): I10 - ESSENTIAL (PRIMARY) HYPERTENSION   





(3) CAD (coronary artery disease)


Code(s): I25.10 - ATHSCL HEART DISEASE OF NATIVE CORONARY ARTERY W/O ANG PCTRS 

  





(4) HLD (hyperlipidemia)


Code(s): E78.5 - HYPERLIPIDEMIA, UNSPECIFIED   





(5) Depression


Code(s): F32.9 - MAJOR DEPRESSIVE DISORDER, SINGLE EPISODE, UNSPECIFIED

## 2017-04-29 NOTE — PN
Progress Note, Physician





- Current Medication List


Current Medications: 


Active Medications





Ceftriaxone Sodium (Rocephin 1gm Ivpb (Pre-Docked))  1 gm IVPB DAILY [facility]


   PRN Reason: Protocol


   Last Admin: 04/28/17 11:03 Dose:  1 gm


Heparin Sodium (Porcine) (Heparin -)  5,000 unit SQ BID [facility]


   Last Admin: 04/28/17 21:21 Dose:  5,000 unit


Metronidazole (Flagyl 500mg Premixed Ivpb -)  100 mls @ 100 mls/hr IVPB Q6H-IV 

[facility]


   Last Admin: 04/29/17 08:51 Dose:  100 mls/hr


Pantoprazole Sodium (Protonix 40mg Ivpb (Pre-Docked))  100 mls @ 200 mls/hr 

IVPB BID [facility]


   Last Admin: 04/28/17 23:22 Dose:  200 mls/hr


Sodium Chloride (Normal Saline -)  1,000 mls @ 140 mls/hr IV ASDIR [facility]


   Last Admin: 04/28/17 14:43 Dose:  140 mls/hr


Lorazepam (Ativan Injection -)  0.5 mg IVPUSH DAILY [facility]


   PRN Reason: ANXIETY


Mirtazapine (Remeron -)  7.5 mg PO HS PRN


   PRN Reason: INSOMNIA


   Last Admin: 04/29/17 00:07 Dose:  7.5 mg


Morphine Sulfate (Morphine Injection -)  4 mg IVPUSH Q4H PRN


   PRN Reason: PAIN


   Last Admin: 04/29/17 08:51 Dose:  4 mg











- Objective


Vital Signs: 


 Vital Signs











Temperature  99.5 F   04/29/17 08:00


 


Pulse Rate  82   04/29/17 08:00


 


Respiratory Rate  18   04/29/17 08:00


 


Blood Pressure  156/72   04/29/17 08:00


 


O2 Sat by Pulse Oximetry (%)  98   04/28/17 22:00











Eyes: Yes: WNL, Conjunctiva Clear, EOM Intact


HENT: Yes: WNL, Atraumatic, Normocephalic


Neck: Yes: WNL, Supple, Trachea Midline


Cardiovascular: Yes: WNL, Regular Rate and Rhythm


Respiratory: Yes: WNL, Regular, CTA Bilaterally


Gastrointestinal: Yes: WNL, Normal Bowel Sounds


Genitourinary: Yes: WNL


Musculoskeletal: Yes: WNL


Extremities: Yes: WNL


Edema: No


Integumentary: Yes: WNL


Neurological: Yes: WNL, Alert, Oriented


...Motor Strength: WNL


Psychiatric: Yes: WNL


Labs: 


 CBC, BMP





 04/27/17 07:05 





 04/25/17 06:35 





 INR, PTT











INR  1.05  (0.82-1.09)   04/24/17  05:05    














Assessment/Plan


Diverticulitis


Pneumoperitoneum resolved


CAD s/p PCI


PHTN





REC:


cardiac wise stable


Continue to  hold antiplatelet Rx in this setting- overall risk is low as PCI 

was > 3 years ago. 


Resume ASA 81mg daily when safe from surgical/GI standpoint.

## 2017-04-30 VITALS — DIASTOLIC BLOOD PRESSURE: 80 MMHG | TEMPERATURE: 99 F | HEART RATE: 87 BPM | SYSTOLIC BLOOD PRESSURE: 152 MMHG

## 2017-04-30 RX ADMIN — SODIUM CHLORIDE SCH MLS/HR: 9 INJECTION, SOLUTION INTRAVENOUS at 10:20

## 2017-04-30 RX ADMIN — ASPIRIN SCH MG: 81 TABLET, COATED ORAL at 10:19

## 2017-04-30 RX ADMIN — METRONIDAZOLE SCH MLS/HR: 500 INJECTION, SOLUTION INTRAVENOUS at 13:40

## 2017-04-30 RX ADMIN — CEFTRIAXONE SCH GM: 1 INJECTION, POWDER, FOR SOLUTION INTRAMUSCULAR; INTRAVENOUS at 10:19

## 2017-04-30 RX ADMIN — METRONIDAZOLE SCH MLS/HR: 500 INJECTION, SOLUTION INTRAVENOUS at 08:26

## 2017-04-30 RX ADMIN — METRONIDAZOLE SCH MLS/HR: 500 INJECTION, SOLUTION INTRAVENOUS at 02:46

## 2017-04-30 RX ADMIN — METRONIDAZOLE SCH: 500 INJECTION, SOLUTION INTRAVENOUS at 15:27

## 2017-04-30 RX ADMIN — PANTOPRAZOLE SODIUM SCH MLS/HR: 40 INJECTION, POWDER, FOR SOLUTION INTRAVENOUS at 10:20

## 2017-04-30 RX ADMIN — HEPARIN SODIUM SCH UNIT: 5000 INJECTION, SOLUTION INTRAVENOUS; SUBCUTANEOUS at 10:21

## 2017-04-30 NOTE — PN
Progress Note, Physician





- Current Medication List


Current Medications: 


Active Medications





Aspirin (Ecotrin -)  81 mg PO DAILY [facility]


   Last Admin: 04/29/17 10:58 Dose:  81 mg


Ceftriaxone Sodium (Rocephin 1gm Ivpb (Pre-Docked))  1 gm IVPB DAILY [facility]


   PRN Reason: Protocol


   Last Admin: 04/29/17 10:03 Dose:  1 gm


Heparin Sodium (Porcine) (Heparin -)  5,000 unit SQ BID [facility]


   Last Admin: 04/29/17 21:32 Dose:  5,000 unit


Metronidazole (Flagyl 500mg Premixed Ivpb -)  100 mls @ 100 mls/hr IVPB Q6H-IV 

[facility]


   Last Admin: 04/30/17 08:26 Dose:  100 mls/hr


Pantoprazole Sodium (Protonix 40mg Ivpb (Pre-Docked))  100 mls @ 200 mls/hr 

IVPB BID [facility]


   Last Admin: 04/29/17 21:30 Dose:  200 mls/hr


Sodium Chloride (Normal Saline -)  1,000 mls @ 140 mls/hr IV ASDIR [facility]


   Last Admin: 04/29/17 23:01 Dose:  140 mls/hr


Mirtazapine (Remeron -)  7.5 mg PO HS PRN


   PRN Reason: INSOMNIA


   Last Admin: 04/29/17 21:29 Dose:  7.5 mg











- Objective


Vital Signs: 


 Vital Signs











Temperature  98.3 F   04/30/17 08:00


 


Pulse Rate  83   04/30/17 08:00


 


Respiratory Rate  18   04/30/17 08:00


 


Blood Pressure  154/81   04/30/17 08:00


 


O2 Sat by Pulse Oximetry (%)  97   04/29/17 21:00











Eyes: Yes: WNL, Conjunctiva Clear, EOM Intact


HENT: Yes: WNL, Atraumatic, Normocephalic


Neck: Yes: WNL, Supple, Trachea Midline


Cardiovascular: Yes: WNL, Regular Rate and Rhythm


Respiratory: Yes: WNL, Regular, CTA Bilaterally


Gastrointestinal: Yes: WNL, Normal Bowel Sounds


Genitourinary: Yes: WNL


Musculoskeletal: Yes: WNL


Extremities: Yes: WNL


Edema: No


Integumentary: Yes: WNL


Neurological: Yes: WNL, Alert, Oriented


...Motor Strength: WNL


Psychiatric: Yes: WNL


Labs: 


 CBC, BMP





 04/27/17 07:05 





 04/25/17 06:35 





 INR, PTT











INR  1.05  (0.82-1.09)   04/24/17  05:05    














Assessment/Plan


Diverticulitis


Pneumoperitoneum resolved


CAD s/p PCI


PHTN





REC:


cardiac wise stable on asa 81


Continue to  hold antiplatelet Rx in this setting- overall risk is low as PCI 

was > 3 years ago.

## 2017-07-19 ENCOUNTER — HOSPITAL ENCOUNTER (INPATIENT)
Dept: HOSPITAL 74 - JASUSAT | Age: 62
LOS: 20 days | Discharge: HOME HEALTH | DRG: 330 | End: 2017-08-08
Attending: SURGERY | Admitting: SURGERY
Payer: COMMERCIAL

## 2017-07-19 VITALS — BODY MASS INDEX: 30.7 KG/M2

## 2017-07-19 DIAGNOSIS — E78.5: ICD-10-CM
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
## 2017-07-19 DIAGNOSIS — I25.10: ICD-10-CM

## 2017-07-19 DIAGNOSIS — E66.9: ICD-10-CM

## 2017-07-19 DIAGNOSIS — Z98.61: ICD-10-CM

## 2017-07-19 DIAGNOSIS — J45.909: ICD-10-CM

## 2017-07-19 DIAGNOSIS — K91.3: ICD-10-CM

## 2017-07-19 DIAGNOSIS — I10: ICD-10-CM

## 2017-07-19 DIAGNOSIS — Z87.891: ICD-10-CM

## 2017-07-19 DIAGNOSIS — K57.32: Primary | ICD-10-CM

## 2017-07-19 DIAGNOSIS — F32.9: ICD-10-CM

## 2017-07-19 DIAGNOSIS — N73.6: ICD-10-CM

## 2017-07-19 LAB
ALBUMIN SERPL-MCNC: 3.2 G/DL (ref 3.4–5)
ALP SERPL-CCNC: 79 U/L (ref 45–117)
ALT SERPL-CCNC: 23 U/L (ref 12–78)
ANION GAP SERPL CALC-SCNC: 9 MMOL/L (ref 8–16)
AST SERPL-CCNC: 28 U/L (ref 15–37)
BASOPHILS # BLD: 0.1 % (ref 0–2)
BILIRUB SERPL-MCNC: 0.3 MG/DL (ref 0.2–1)
CALCIUM SERPL-MCNC: 8.5 MG/DL (ref 8.5–10.1)
CO2 SERPL-SCNC: 25 MMOL/L (ref 21–32)
CREAT SERPL-MCNC: 1.6 MG/DL (ref 0.55–1.02)
DEPRECATED RDW RBC AUTO: 14.5 % (ref 11.6–15.6)
EOSINOPHIL # BLD: 0 % (ref 0–4.5)
GLUCOSE SERPL-MCNC: 152 MG/DL (ref 74–106)
MCH RBC QN AUTO: 27.5 PG (ref 25.7–33.7)
MCHC RBC AUTO-ENTMCNC: 32.5 G/DL (ref 32–36)
MCV RBC: 84.8 FL (ref 80–96)
NEUTROPHILS # BLD: 83.5 % (ref 42.8–82.8)
PLATELET # BLD AUTO: 208 K/MM3 (ref 134–434)
PMV BLD: 7.8 FL (ref 7.5–11.1)
PROT SERPL-MCNC: 6 G/DL (ref 6.4–8.2)
WBC # BLD AUTO: 11.5 K/MM3 (ref 4–10)

## 2017-07-19 RX ADMIN — HYDROMORPHONE HYDROCHLORIDE PRN MG: 1 INJECTION, SOLUTION INTRAMUSCULAR; INTRAVENOUS; SUBCUTANEOUS at 18:55

## 2017-07-19 RX ADMIN — HYDROMORPHONE HYDROCHLORIDE PRN MG: 1 INJECTION, SOLUTION INTRAMUSCULAR; INTRAVENOUS; SUBCUTANEOUS at 18:45

## 2017-07-19 RX ADMIN — HYDROMORPHONE HYDROCHLORIDE PRN MG: 2 INJECTION, SOLUTION INTRAMUSCULAR; INTRAVENOUS; SUBCUTANEOUS at 23:07

## 2017-07-19 RX ADMIN — HYDROMORPHONE HYDROCHLORIDE PRN MG: 1 INJECTION, SOLUTION INTRAMUSCULAR; INTRAVENOUS; SUBCUTANEOUS at 19:15

## 2017-07-19 RX ADMIN — SODIUM CHLORIDE, POTASSIUM CHLORIDE, SODIUM LACTATE AND CALCIUM CHLORIDE SCH: 600; 310; 30; 20 INJECTION, SOLUTION INTRAVENOUS at 21:47

## 2017-07-19 RX ADMIN — HYDROMORPHONE HYDROCHLORIDE PRN MG: 1 INJECTION, SOLUTION INTRAMUSCULAR; INTRAVENOUS; SUBCUTANEOUS at 19:25

## 2017-07-19 NOTE — OP
Operative Note





- Note:


Operative Date: 07/19/17


Pre-Operative Diagnosis: Recurrent sigmoid diverticulitis


Operation: Robotic sigmoidectomy


Findings: 


sigmoid diverticulosis with adhesions to uterus


Surgeon: Chang Rowe


Assistant: Melodie Jimenez


Anesthesia: General


Specimens Removed: sigmoid colon


Estimated Blood Loss (mls): 100


Drains & Tubes with Location: FERN # 10 IN PELVIS


Operative Report Dictated: Yes

## 2017-07-20 LAB
ALBUMIN SERPL-MCNC: 2.9 G/DL (ref 3.4–5)
ALP SERPL-CCNC: 65 U/L (ref 45–117)
ALT SERPL-CCNC: 25 U/L (ref 12–78)
ANION GAP SERPL CALC-SCNC: 10 MMOL/L (ref 8–16)
AST SERPL-CCNC: 58 U/L (ref 15–37)
BASOPHILS # BLD: 0 % (ref 0–2)
BILIRUB SERPL-MCNC: 0.4 MG/DL (ref 0.2–1)
CALCIUM SERPL-MCNC: 8 MG/DL (ref 8.5–10.1)
CO2 SERPL-SCNC: 25 MMOL/L (ref 21–32)
CREAT SERPL-MCNC: 1.4 MG/DL (ref 0.55–1.02)
DEPRECATED RDW RBC AUTO: 14.3 % (ref 11.6–15.6)
EOSINOPHIL # BLD: 0 % (ref 0–4.5)
GLUCOSE SERPL-MCNC: 116 MG/DL (ref 74–106)
MCH RBC QN AUTO: 28.8 PG (ref 25.7–33.7)
MCHC RBC AUTO-ENTMCNC: 33.6 G/DL (ref 32–36)
MCV RBC: 85.8 FL (ref 80–96)
NEUTROPHILS # BLD: 86 % (ref 42.8–82.8)
PLATELET # BLD AUTO: 166 K/MM3 (ref 134–434)
PMV BLD: 8.3 FL (ref 7.5–11.1)
PROT SERPL-MCNC: 5.8 G/DL (ref 6.4–8.2)
WBC # BLD AUTO: 10.1 K/MM3 (ref 4–10)

## 2017-07-20 RX ADMIN — PANTOPRAZOLE SODIUM SCH MG: 40 TABLET, DELAYED RELEASE ORAL at 10:24

## 2017-07-20 RX ADMIN — FUROSEMIDE SCH MG: 20 TABLET ORAL at 10:24

## 2017-07-20 RX ADMIN — ENOXAPARIN SODIUM SCH MG: 40 INJECTION SUBCUTANEOUS at 17:53

## 2017-07-20 RX ADMIN — CLINDAMYCIN PHOSPHATE SCH MLS/HR: 600 INJECTION, SOLUTION INTRAVENOUS at 21:11

## 2017-07-20 RX ADMIN — SODIUM CHLORIDE, POTASSIUM CHLORIDE, SODIUM LACTATE AND CALCIUM CHLORIDE SCH MLS/HR: 600; 310; 30; 20 INJECTION, SOLUTION INTRAVENOUS at 06:22

## 2017-07-20 RX ADMIN — HYDROMORPHONE HYDROCHLORIDE PRN MG: 2 INJECTION, SOLUTION INTRAMUSCULAR; INTRAVENOUS; SUBCUTANEOUS at 22:16

## 2017-07-20 RX ADMIN — CLINDAMYCIN PHOSPHATE SCH MLS/HR: 600 INJECTION, SOLUTION INTRAVENOUS at 02:03

## 2017-07-20 RX ADMIN — CLINDAMYCIN PHOSPHATE SCH MLS/HR: 600 INJECTION, SOLUTION INTRAVENOUS at 08:59

## 2017-07-20 RX ADMIN — HYDROMORPHONE HYDROCHLORIDE PRN MG: 2 INJECTION, SOLUTION INTRAMUSCULAR; INTRAVENOUS; SUBCUTANEOUS at 08:29

## 2017-07-20 RX ADMIN — HYDROMORPHONE HYDROCHLORIDE PRN MG: 2 INJECTION, SOLUTION INTRAMUSCULAR; INTRAVENOUS; SUBCUTANEOUS at 03:28

## 2017-07-20 RX ADMIN — SODIUM CHLORIDE, POTASSIUM CHLORIDE, SODIUM LACTATE AND CALCIUM CHLORIDE SCH: 600; 310; 30; 20 INJECTION, SOLUTION INTRAVENOUS at 19:26

## 2017-07-20 RX ADMIN — SODIUM CHLORIDE, POTASSIUM CHLORIDE, SODIUM LACTATE AND CALCIUM CHLORIDE SCH MLS/HR: 600; 310; 30; 20 INJECTION, SOLUTION INTRAVENOUS at 21:13

## 2017-07-20 RX ADMIN — HYDROMORPHONE HYDROCHLORIDE PRN MG: 2 INJECTION, SOLUTION INTRAMUSCULAR; INTRAVENOUS; SUBCUTANEOUS at 15:14

## 2017-07-20 RX ADMIN — ENOXAPARIN SODIUM SCH: 40 INJECTION SUBCUTANEOUS at 07:38

## 2017-07-20 RX ADMIN — CLINDAMYCIN PHOSPHATE SCH MLS/HR: 600 INJECTION, SOLUTION INTRAVENOUS at 14:18

## 2017-07-20 NOTE — PN
Progress Note (short form)





- Note


Progress Note: 


POD #1.





No acute events since surgery per RN notes.


Patient is alert. Resting in position of comfort. 


Hasn't been oob yet.


Denies n/v/f/c, CP or SOB.





 Last Vital Signs











Temp Pulse Resp BP Pulse Ox


 


 98.9 F   87   20   112/62   95 


 


 07/20/17 06:36  07/20/17 06:36  07/20/17 06:36  07/20/17 06:36  07/19/17 21:49








 CBC, BMP





 07/20/17 06:30 





 07/20/17 06:30 





General: nad


abd: all surgical ports intact. No hematoma. FERN 25mL (serosang) on bulb suction.


: gonzalez to gravity 700mL/clear


LE: SCDs b/l. No pain/swelling/edema





Problem List





- Problems


(1) Status post laparoscopic-assisted sigmoidectomy


Assessment/Plan: 


POD #1 s/p Robotic assisted sigmoid resection with primary anastamosis (for 

recurrent sigmoid diverticulitis)





NPO except ice chips


GI / DVT PPX


OOB to chair


Monitor FERN output


Gonzalez to be dc'd in AM


Pain management PRN


Awaiting bowel function to resume before starting clear liquids


Code(s): Z90.49 - ACQUIRED ABSENCE OF OTHER SPECIFIED PARTS OF DIGESTIVE TRACT

## 2017-07-20 NOTE — PN
Progress Note, Physician


Chief Complaint: 


Post op day s/p robotic sigmoid colectomy





Denies chest pain, SOB, palps


Awake and alert.


History of Present Illness: 


Known to our group from office:


Pertinent cardiac history includes CAD , HTN, HLD with PCI of RCA 2009. 


Repeat cath 2/24/17 showed non-obstructive disease with patent intervention 

site.








Blood pressure has been stable, low-normal to normal.





- Current Medication List


Current Medications: 


Active Medications





Albuterol Sulfate (Ventolin Hfa Inhaler -)  2 puff IH Q4H PRN


   PRN Reason: SHORT OF BREATH/WHEEZING


Enoxaparin Sodium (Lovenox -)  40 mg SQ DAILY@0600 [facility]


   Last Admin: 07/20/17 07:38 Dose:  Not Given


Furosemide (Lasix -)  20 mg PO DAILY [facility]


Hydromorphone HCl (Dilaudid Injection -)  2 mg IVPB Q4H PRN


   PRN Reason: PAIN


   Last Admin: 07/20/17 08:29 Dose:  2 mg


Levofloxacin (Levaquin 750 Mg Premixed Ivpb -)  150 mls @ 100 mls/hr IVPB ONCE 

ONE


   Stop: 07/20/17 13:29


Lactated Ringer's (Lactated Ringers Solution)  1,000 mls @ 125 mls/hr IV ASDIR 

[facility]


   Last Admin: 07/20/17 06:22 Dose:  125 mls/hr


Clindamycin Phosphate (Cleocin 600 Mg Premix Ivpb -)  50 mls @ 100 mls/hr IVPB 

Q6H-IV KOBE


   Stop: 07/21/17 02:59


   Last Admin: 07/20/17 08:59 Dose:  100 mls/hr


Pantoprazole Sodium (Protonix -)  40 mg PO DAILY [facility]











- Objective


Vital Signs: 


 Vital Signs











Temperature  98.9 F   07/20/17 06:36


 


Pulse Rate  87   07/20/17 06:36


 


Respiratory Rate  20   07/20/17 06:36


 


Blood Pressure  112/62   07/20/17 06:36


 


O2 Sat by Pulse Oximetry (%)  95   07/19/17 21:49











Constitutional: Yes: No Distress, Calm


Eyes: Yes: Conjunctiva Clear


Neck: Yes: Supple


Cardiovascular: Yes: Regular Rate and Rhythm


Respiratory: Yes: CTA Bilaterally (no wheezing or rales.)


Gastrointestinal: Yes: Soft, Other (drain in place with serosanguinous fluid)


Edema: No


Neurological: Yes: Alert, Oriented


...Motor Strength: WNL


Labs: 


 CBC, BMP





 07/20/17 06:30 





 07/20/17 06:30 





 Laboratory Tests











  07/20/17 07/20/17





  06:30 06:30


 


WBC  10.1 H 


 


Hgb  10.6 L 


 


Plt Count  166  D 


 


Sodium   141


 


Potassium   3.9


 


Creatinine   1.4 H


 


Total Bilirubin   0.4  D


 


AST   58 H D


 


ALT   25


 


Alkaline Phosphatase   65














- ....Imaging


EKG: Image Reviewed (NSR 79bpm)





Assessment/Plan


IMP:


POD # s/p robotic sigmoid colectomy


CAD s/p PCI RCA 2009 w/ recent cath showing non-obstructive CAD w/ patent 

intervention site





REC:


Tolerated surgery well; in sinus rhythm and hemodynamically stable with no 

cardiovascular complaints.


Resume ASA 81mg daily when feasible from surgical standpoint.


Continue DVT prophylaxis.

## 2017-07-20 NOTE — SURG
Surgery First Assist Note


First Assist: Melodie Jimenez PA-C


Date of Service: 07/19/17


Diagnosis: 


 Recurrent sigmoid diverticulitis





Procedure: 





 Robotic sigmoidectomy


I was present for the entirety of the operative procedure. For further detail, 

please refer to operative report.








Visit type





- Case Type


Case Type: Scheduled Admission





- Emergency


Emergency Visit: No





- New patient


This patient is new to me today: Yes


Date on this admission: 07/20/17





- Critical Care


Critical Care patient: No

## 2017-07-20 NOTE — OP
DATE OF OPERATION:  07/19/2017

 

PROCEDURE:  Robotic-assisted laparoscopic partial colectomy/sigmoidectomy and 
splenic

flexure takedown.

 

PREOPERATIVE DIAGNOSIS:  Chronic, recurrent sigmoid diverticulitis.

 

POSTOPERATIVE DIAGNOSIS:  Chronic, recurrent sigmoid diverticulitis.

 

SURGEON:  Chang Rowe MD

 

FIRST ASSISTANT:  Melodie Jimenez PA-C

 

ANESTHESIA:  General endotracheal.

 

FINDINGS AND PROCEDURE:  This is a 61-year-old female who presents with 
recurrent

sigmoid diverticulitis which started in 2014.  However, this past year, patient 

had 3 attacks of sigmoid diverticulitis with severe attack a few months ago 

requiring admission and l2 weeks prior with left lower quadrant pain requiring

oral antibiotics. Patient had colonoscopy 3 years prior, which showed

diverticular disease but no malignant neoplasm.  Due to recurrence of the

condition, patient was advised by PMD and Gastroenterology, as well as the

undersigned to undergo resection of the diseased segment of the colon.  Consent 
was

obtained after discussing the risks, benefits, and alternatives to the 
procedure.

 

DESCRIPTION OF PROCEDURE:

Patient was brought to the operating room and placed in supine position.  
General

endotracheal anesthesia was administered.  The patient was then placed in a 
modified

lithotomy position.  Bilateral ureteral stents were placed by Dr. Nguyen.  
After that, 

the abdomen was prepped and draped in usual sterile fashion.  Using 0.5%

Marcaine, local anesthesia was administered to the proposed incision sites.  The

peritoneal cavity was initially entered using the Veress needle technique at the

right upper quadrant, however, no clear pneumoperitoneum was established.  The 
left

upper quadrant was then used to enter the peritoneal cavity again using the 
Veress

needle technique.  This time, adequate pneumoperitoneum was established.  An 8-
mm incision

was made at the right upper quadrant, and an 8-mm port was inserted.  The 3D

laparoscope was inserted, and the peritoneal cavity was carefully inspected and 
was

noted to be free of inadvertent injury.  Patient was then placed in steep

Trendelenburg, right-side-down position.  The sigmoid colon was noted to have

adhesions to the posterior wall of the uterus. Diverticulosis was noted at the 
sigmoid 

all the way at the junction between descending colon and the sigmoid colon.  

Three other 8-mm ports were inserted, one at the subxiphoid region, 

another at the level of the umbilicus 7 mm away from the right upper quadrant 
port, 

and then one 12-mm port at the right lower quadrant anterior to the ASIS in 
diagonal fashion.  

The target organ was set, and the robotic arms were docked.  The EndoWrist 
angelica were 

inserted at the right lower quadrant port.  The camera port was inserted at the 
number 3 port 

at the level of the umbilicus.  The fenestrated bipolar grasper was inserted at 
the right

upper quadrant port, and the EndoWrist bowel grasper was inserted at the 
subxiphoid

port.  The splenic flexure was [id number] taken down by incising the white line 
of

Toldt using the EndoWrist angelica connected to monopolar cautery.  This was 
carried

towards the splenic flexure. To complete the mobilization, the vessel sealer 
was used 

to take down the greater omentum at the distal transverse colon.  

After adequate mobilization was achieved, attention was then focused at the

left lower quadrant and the pelvis.  A medial-to-lateral dissection was made by 

incising the medial visceral peritoneum of the sigmoid colon down to the sacral 
promontory.  

The inferior mesenteric artery was identified as well as the sigmoid vessels.  

The inferior mesenteric artery was spared, and the branch to the sigmoid colon 

was transected using the vessel sealing device. The sigmoid arteries from the 
superior

hemorrhoidal vessels were isolated and clipped at 3 points using the Hem-o-Nash 
clips.  

This vessel was then transected leaving 2 Hem-o-Nash clips at the sigmoid artery 
stump.  

The dissection was carried laterally above the retroperitoneum after 
identifying the ureter properly. 

Further dissection distally towards the sacral promontory was done using the 
vessel

sealing device with transection of the sigmoid mesocolon and partial opening of 
the

peritoneal reflection.  The rectosigmoid junction was carefully skeletonized 
using

the vessel-sealing device.  The colon was then transected by firing the Endo 
CHANDRAKANT blue

load stapler twice.  After that, the proximal line of resection at the junction 
between the

descending and sigmoid colon was made with the aid of the Firefly technology 

to ensure adequate blood supply to the proximal line of resection. The Endo CHANDRAKANT 
blue

load stapling device was also used.  After this was done, the robotic arms were 
undocked, and

pneumoperitoneum was evacuated.  An 8-cm Pfannenstiel incision was made using 
scalpel

blade No. 10.  Dissection was carried down to the subcutaneous tissue using 
Bovie

cautery until the fascia was encountered.  This was also incised to about 8 cm, 
and

the pyramidalis muscle was partially transected to enter the peritoneal cavity.
   

A medium-size Bo wound retractor was deployed.  The specimen was

delivered via the suprapubic incision.  The descending colon was also 
exteriorized,

and a 33-mm EA stapling device was used for the anastomosis.  The anvil was 
inserted

into the descending colon and tied with a pursestring suture.  The assistant 
then

inserted the EEA stapler and this time under laparoscopic guidance.  The 
stapler was

then fired and 2 full donuts were noted.  Part of the the proximal donut 

circumference was noted to be thin.  The anastomosis was then tested by 
irrigating

the pelvis with sterile normal saline, and the insufflation of the rectum with 
rigid

proctoscope.  During the maneuver, no bubbles were noted.  The irrigation fluid 
was

then suctioned until the return was clear.  The pneumoperitoneum was then 
evacuated,

and all the ports were removed.  A Baudilio-Brush number 10 drain was then 
deployed

into the pelvis and exited via the right lower quadrant port site and tied with 
a

silk 3-0 suture.  The wound was closed with continuous Vicryl 0 suture for the

peritoneum and continuous Vicryl 0 suture for the fascia.  All the skin wounds 
were

closed with skin staples.  The wounds were covered with sterile dressing.  The

bilateral ureteral stents were removed.  The patient was placed back in supine

position and successfully extubated.  Patient was transferred to the 
postanesthesia

care unit in satisfactory condition.  Estimated blood loss was about 100 mL.  
Wound

class clean/contaminated.  The patient received 750 mg of Levaquin and 600 mg of

clindamycin prior to the start of the procedure.

 

 

GLORIA [id number]

DD: 07/19/2017 19:37

DT: 07/20/2017 15:41

Job #:  [id number]

MTDD

## 2017-07-20 NOTE — PN
Progress Note (short form)





- Note


Progress Note: 


Anesthesiology Post-op


POD#1 s/p Robotic partial colectomy under GA. Pt. seen earlier this afternoon 

and was resting comfortably in bed. She did c/o pain with movement but stated 

that it improved with pain medication. I encouraged incentive spirometry. VSS. 

No aparent anesthesia-related issues.

## 2017-07-20 NOTE — PN
Progress Note (short form)





- Note


Progress Note: 


POD#1


Afebrile, VSS


Tolerating clear liquids, mildly nauseated


Abd: mildly distended, wound dressings dry and intact


FERN serosanguinous


Sosa with blood tinged urine


WBC = 10.1k, CREA = 1.4


A/P: stable and doing well, possible perioperative volume depletion


continue IVF


monitor BUN/creatinine


resume ASA in am

## 2017-07-21 LAB
ANION GAP SERPL CALC-SCNC: 9 MMOL/L (ref 8–16)
BASOPHILS # BLD: 0.3 % (ref 0–2)
CALCIUM SERPL-MCNC: 8.2 MG/DL (ref 8.5–10.1)
CO2 SERPL-SCNC: 27 MMOL/L (ref 21–32)
CREAT SERPL-MCNC: 1.1 MG/DL (ref 0.55–1.02)
DEPRECATED RDW RBC AUTO: 14.3 % (ref 11.6–15.6)
EOSINOPHIL # BLD: 0.3 % (ref 0–4.5)
GLUCOSE SERPL-MCNC: 95 MG/DL (ref 74–106)
MAGNESIUM SERPL-MCNC: 2 MG/DL (ref 1.8–2.4)
MCH RBC QN AUTO: 28.6 PG (ref 25.7–33.7)
MCHC RBC AUTO-ENTMCNC: 33.3 G/DL (ref 32–36)
MCV RBC: 85.9 FL (ref 80–96)
NEUTROPHILS # BLD: 74.1 % (ref 42.8–82.8)
PHOSPHATE SERPL-MCNC: 3 MG/DL (ref 2.5–4.9)
PLATELET # BLD AUTO: 166 K/MM3 (ref 134–434)
PMV BLD: 8.6 FL (ref 7.5–11.1)
WBC # BLD AUTO: 9.8 K/MM3 (ref 4–10)

## 2017-07-21 RX ADMIN — WHITE PETROLATUM SCH APPLIC: 1 OINTMENT TOPICAL at 20:39

## 2017-07-21 RX ADMIN — ONDANSETRON PRN MG: 2 INJECTION INTRAMUSCULAR; INTRAVENOUS at 13:22

## 2017-07-21 RX ADMIN — FUROSEMIDE SCH: 20 TABLET ORAL at 11:47

## 2017-07-21 RX ADMIN — ACETAMINOPHEN PRN MG: 10 INJECTION, SOLUTION INTRAVENOUS at 16:44

## 2017-07-21 RX ADMIN — HYDROMORPHONE HYDROCHLORIDE PRN MG: 2 INJECTION, SOLUTION INTRAMUSCULAR; INTRAVENOUS; SUBCUTANEOUS at 01:59

## 2017-07-21 RX ADMIN — SODIUM CHLORIDE, POTASSIUM CHLORIDE, SODIUM LACTATE AND CALCIUM CHLORIDE SCH MLS/HR: 600; 310; 30; 20 INJECTION, SOLUTION INTRAVENOUS at 18:59

## 2017-07-21 RX ADMIN — ACETAMINOPHEN PRN MG: 10 INJECTION, SOLUTION INTRAVENOUS at 23:13

## 2017-07-21 RX ADMIN — METOPROLOL TARTRATE PRN MG: 5 INJECTION, SOLUTION INTRAVENOUS at 19:30

## 2017-07-21 RX ADMIN — POTASSIUM CHLORIDE, DEXTROSE MONOHYDRATE AND SODIUM CHLORIDE SCH MLS/HR: 150; 5; 450 INJECTION, SOLUTION INTRAVENOUS at 20:37

## 2017-07-21 RX ADMIN — ENOXAPARIN SODIUM SCH MG: 40 INJECTION SUBCUTANEOUS at 06:01

## 2017-07-21 RX ADMIN — ASPIRIN SCH: 81 TABLET, COATED ORAL at 11:23

## 2017-07-21 RX ADMIN — HYDROMORPHONE HYDROCHLORIDE PRN MG: 2 INJECTION, SOLUTION INTRAMUSCULAR; INTRAVENOUS; SUBCUTANEOUS at 06:01

## 2017-07-21 RX ADMIN — PANTOPRAZOLE SODIUM SCH: 40 TABLET, DELAYED RELEASE ORAL at 11:23

## 2017-07-21 NOTE — PN
Progress Note, Physician





- Current Medication List


Current Medications: 


Active Medications





Acetaminophen (Ofirmev Injection -)  1,000 mg IVPB Q6H PRN


   Last Admin: 07/21/17 16:44 Dose:  1,000 mg


Albuterol Sulfate (Ventolin Hfa Inhaler -)  2 puff IH Q4H PRN


   PRN Reason: SHORT OF BREATH/WHEEZING


Aspirin (Ecotrin -)  81 mg PO DAILY [facility]


   Last Admin: 07/21/17 11:23 Dose:  Not Given


Bupropion HCl (Wellbutrin Xl -)  150 mg PO DAILY [facility]


   Last Admin: 07/21/17 11:23 Dose:  Not Given


Enoxaparin Sodium (Lovenox -)  40 mg SQ DAILY@0600 [facility]


   Last Admin: 07/21/17 06:01 Dose:  40 mg


Furosemide (Lasix -)  20 mg PO DAILY [facility]


   Last Admin: 07/21/17 11:47 Dose:  Not Given


Lactated Ringer's (Lactated Ringers Solution)  1,000 mls @ 100 mls/hr IV ASDIR 

[facility]


   Last Admin: 07/21/17 18:59 Dose:  100 mls/hr


Potassium Chloride/Dextrose/Sod Cl (D5-1/2ns+20 Meq Kcl -)  1,000 mls @ 83 mls/

hr IV ASDIR [facility]


   Last Admin: 07/21/17 20:37 Dose:  83 mls/hr


Metoprolol Tartrate (Lopressor Injection -)  5 mg IVPB Q6H PRN


   PRN Reason: SBP > 160


   Last Admin: 07/21/17 19:30 Dose:  5 mg


Ondansetron HCl (Zofran Injection)  4 mg IVPB Q6H PRN


   PRN Reason: NAUSEA AND/OR VOMITING


   Last Admin: 07/21/17 13:22 Dose:  4 mg


Pantoprazole Sodium (Protonix -)  40 mg PO DAILY [facility]


   Last Admin: 07/21/17 11:23 Dose:  Not Given


Petrolatum (Vaseline)  1 applic TP DAILY [facility]


   Last Admin: 07/21/17 20:39 Dose:  1 applic











- Objective


Vital Signs: 


 Vital Signs











Temperature  99.7 F H  07/21/17 16:20


 


Pulse Rate  84   07/21/17 19:30


 


Respiratory Rate  20   07/21/17 16:20


 


Blood Pressure  161/82   07/21/17 19:30


 


O2 Sat by Pulse Oximetry (%)  100   07/21/17 10:00











Labs: 


 CBC, BMP





 07/21/17 06:45 





 07/21/17 06:45

## 2017-07-21 NOTE — PN
Progress Note (short form)





- Note


Progress Note: 


POD #2





Patient vomited multiple times in the morning consisting of gastric fluid.


Place on NPO and currently denies nausea.


Tmax = 100.3 VSS


Abd: mildly distended, soft, wound intact, FERN drainage serosanguinous


WBC = 9.8


A/P: post-op ileus, s/p robotic partial colectomy


F/U FUA


keep patient NPO 


Continue IVF, IS, GI, & DVT prophylaxis

## 2017-07-21 NOTE — PN
Progress Note, Physician


Chief Complaint: 


no chest pain or SOB


Having some vomiting since 5am, acidic fluid


History of Present Illness: 


blood pressure well controlled, with one mildly elevated reading this AM prior 

to meds and after episode vomiting.





- Current Medication List


Current Medications: 


Active Medications





Albuterol Sulfate (Ventolin Hfa Inhaler -)  2 puff IH Q4H PRN


   PRN Reason: SHORT OF BREATH/WHEEZING


Aspirin (Ecotrin -)  81 mg PO DAILY [facility]


Bupropion HCl (Wellbutrin Xl -)  150 mg PO DAILY [facility]


Enoxaparin Sodium (Lovenox -)  40 mg SQ DAILY@0600 [facility]


   Last Admin: 07/21/17 06:01 Dose:  40 mg


Furosemide (Lasix -)  20 mg PO DAILY [facility]


   Last Admin: 07/20/17 10:24 Dose:  20 mg


Hydromorphone HCl (Dilaudid Injection -)  2 mg IVPB Q4H PRN


   PRN Reason: PAIN


   Last Admin: 07/21/17 06:01 Dose:  2 mg


Lactated Ringer's (Lactated Ringers Solution)  1,000 mls @ 100 mls/hr IV ASDIR 

[facility]


   Last Admin: 07/20/17 21:13 Dose:  100 mls/hr


Pantoprazole Sodium (Protonix -)  40 mg PO DAILY [facility]


   Last Admin: 07/20/17 10:24 Dose:  40 mg











- Objective


Vital Signs: 


 Vital Signs











Temperature  97.9 F   07/21/17 06:00


 


Pulse Rate  77   07/21/17 06:00


 


Respiratory Rate  20   07/21/17 06:00


 


Blood Pressure  161/77   07/21/17 06:00


 


O2 Sat by Pulse Oximetry (%)  100   07/20/17 21:00











Constitutional: Yes: No Distress, Calm


Eyes: Yes: Conjunctiva Clear


Cardiovascular: Yes: Regular Rate and Rhythm


Respiratory: Yes: CTA Bilaterally (no wheezing or rales)


Gastrointestinal: Yes: Soft


Edema: No


Neurological: Yes: Alert, Oriented


...Motor Strength: WNL


Labs: 


 CBC, BMP





 07/21/17 06:45 





 07/21/17 06:45 





 Laboratory Tests











  07/21/17 07/21/17





  06:45 06:45


 


WBC  9.8 


 


Hgb  10.3 L 


 


Hct  30.8 L 


 


Plt Count  166 


 


Sodium   139


 


Potassium   3.7


 


BUN   23 H D


 


Creatinine   1.1 H D














Assessment/Plan


IMP:


POD # 2 s/p robotic sigmoid colectomy


CAD s/p PCI RCA 2009 w/ recent cath showing non-obstructive CAD w/ patent 

intervention site





REC:


Tolerated surgery well; remains in sinus rhythm and hemodynamically stable with 

no cardiovascular complaints.


ASA 81mg to be resumed today.


Continue DVT prophylaxis.


Will keep eye on BP, one isolated mildly elevated reading this AM prior to meds 

and after episode of vomiting.


Overall trend has been well controlled.

## 2017-07-21 NOTE — PATH
Surgical Pathology Report



Patient Name:  KEITH MCKEON

Accession #:  [id number]

Med. Rec. #:  [id number]                                                        

   /Age/Gender:  1955 (Age: 61) / F

Account:  [id number]                                                          

             Location: [facility] MED/SURG

Taken:  2017

Received:  2017

Reported:  2017

Physicians:  Chang Rowe M.D.

  



Specimen(s) Received

A: SIGMOID COLON 

B: EEA WITH COLON ANASTOMOSIS 





Clinical History

Acute diverticulitis







Final Diagnosis

A. COLON, SIGMOID, RESECTION:  

DIVERTICULOSIS WITH PERICOLIC INFLAMMATION AND ABSCESS FORMATION SUGGESTIVE OF

PRIOR PERFORATION.

ADDITIONAL MUCOSAL HYPERPLASTIC CHANGES ARE PRESENT.



B. COLON, ANASTOMOTIC DONUTS, EXCISION:  

PORTIONS OF COLONIC WALL CONSISTENT WITH ANASTOMOTIC DONUTS







***Electronically Signed***

James Mccullough M.D.





Gross Description

A. Received in formalin labeled "sigmoid colon," is a 21.5 cm in length portion

of colon with 2 stapled mucosal margins and moderate attached pericolonic

adipose tissue. The serosa is tan-grey with focal bulges. The mucosa is tan with

normal folds. No mucosal masses are identified. Sectioning reveals multiple

diverticula. No definite areas of perforation are identified. Representative

sections are submitted in 11 cassettes as follows: [id number] margins;

3-11-representative diverticula.



B. Received in formalin labeled "EEA with colon anastomosis," are 2 tan, annular

portions of bowel, consistent with donuts. The specimens measure 1.7 and 2.7 cm

in diameter and display focal staples and suture material. There is a grey

metallic surgical device also received within the same container. Representative

sections of the donuts are submitted in 2 cassettes.

/2017



[email or web address]

## 2017-07-22 LAB
ANION GAP SERPL CALC-SCNC: 10 MMOL/L (ref 8–16)
BASOPHILS # BLD: 0.2 % (ref 0–2)
CALCIUM SERPL-MCNC: 8.3 MG/DL (ref 8.5–10.1)
CO2 SERPL-SCNC: 28 MMOL/L (ref 21–32)
CREAT SERPL-MCNC: 0.9 MG/DL (ref 0.55–1.02)
DEPRECATED RDW RBC AUTO: 14 % (ref 11.6–15.6)
EOSINOPHIL # BLD: 0.4 % (ref 0–4.5)
GLUCOSE SERPL-MCNC: 79 MG/DL (ref 74–106)
MCH RBC QN AUTO: 28.4 PG (ref 25.7–33.7)
MCHC RBC AUTO-ENTMCNC: 33.4 G/DL (ref 32–36)
MCV RBC: 85.2 FL (ref 80–96)
NEUTROPHILS # BLD: 79.8 % (ref 42.8–82.8)
PLATELET # BLD AUTO: 212 K/MM3 (ref 134–434)
PMV BLD: 8.4 FL (ref 7.5–11.1)
WBC # BLD AUTO: 10.6 K/MM3 (ref 4–10)

## 2017-07-22 RX ADMIN — POTASSIUM CHLORIDE, DEXTROSE MONOHYDRATE AND SODIUM CHLORIDE SCH MLS/HR: 150; 5; 450 INJECTION, SOLUTION INTRAVENOUS at 15:15

## 2017-07-22 RX ADMIN — POTASSIUM CHLORIDE, DEXTROSE MONOHYDRATE AND SODIUM CHLORIDE SCH: 150; 5; 450 INJECTION, SOLUTION INTRAVENOUS at 20:25

## 2017-07-22 RX ADMIN — FUROSEMIDE SCH MG: 20 TABLET ORAL at 10:24

## 2017-07-22 RX ADMIN — ONDANSETRON PRN MG: 2 INJECTION INTRAMUSCULAR; INTRAVENOUS at 16:45

## 2017-07-22 RX ADMIN — ACETAMINOPHEN PRN MG: 10 INJECTION, SOLUTION INTRAVENOUS at 09:15

## 2017-07-22 RX ADMIN — ONDANSETRON PRN MG: 2 INJECTION INTRAMUSCULAR; INTRAVENOUS at 10:24

## 2017-07-22 RX ADMIN — ENOXAPARIN SODIUM SCH MG: 40 INJECTION SUBCUTANEOUS at 06:31

## 2017-07-22 RX ADMIN — WHITE PETROLATUM SCH APPLIC: 1 OINTMENT TOPICAL at 10:24

## 2017-07-22 RX ADMIN — ACETAMINOPHEN PRN MG: 10 INJECTION, SOLUTION INTRAVENOUS at 17:27

## 2017-07-22 RX ADMIN — PANTOPRAZOLE SODIUM SCH MG: 40 TABLET, DELAYED RELEASE ORAL at 10:24

## 2017-07-22 RX ADMIN — Medication SCH EACH: at 22:15

## 2017-07-22 RX ADMIN — LIDOCAINE SCH PATCH: 50 PATCH TOPICAL at 12:59

## 2017-07-22 RX ADMIN — ASPIRIN SCH MG: 81 TABLET, COATED ORAL at 10:24

## 2017-07-22 NOTE — PN
Progress Note, Physician


Chief Complaint: 


BP remained elevated through the day


Ordered IV metoprolol PRN





Feels fine today, passing flatus





- Current Medication List


Current Medications: 


Active Medications





Acetaminophen (Ofirmev Injection -)  1,000 mg IVPB Q6H PRN


   Last Admin: 07/22/17 09:15 Dose:  1,000 mg


Albuterol Sulfate (Ventolin Hfa Inhaler -)  2 puff IH Q4H PRN


   PRN Reason: SHORT OF BREATH/WHEEZING


Aspirin (Ecotrin -)  81 mg PO DAILY [facility]


   Last Admin: 07/21/17 11:23 Dose:  Not Given


Bupropion HCl (Wellbutrin Xl -)  150 mg PO DAILY [facility]


   Last Admin: 07/21/17 11:23 Dose:  Not Given


Enoxaparin Sodium (Lovenox -)  40 mg SQ DAILY@0600 [facility]


   Last Admin: 07/22/17 06:31 Dose:  40 mg


Furosemide (Lasix -)  20 mg PO DAILY [facility]


   Last Admin: 07/21/17 11:47 Dose:  Not Given


Lactated Ringer's (Lactated Ringers Solution)  1,000 mls @ 100 mls/hr IV ASDIR 

[facility]


   Last Admin: 07/21/17 18:59 Dose:  100 mls/hr


Potassium Chloride/Dextrose/Sod Cl (D5-1/2ns+20 Meq Kcl -)  1,000 mls @ 83 mls/

hr IV ASDIR [facility]


   Last Admin: 07/21/17 20:37 Dose:  83 mls/hr


Metoprolol Tartrate (Lopressor Injection -)  5 mg IVPB Q6H PRN


   PRN Reason: SBP > 160


   Last Admin: 07/21/17 19:30 Dose:  5 mg


Ondansetron HCl (Zofran Injection)  4 mg IVPB Q6H PRN


   PRN Reason: NAUSEA AND/OR VOMITING


   Last Admin: 07/21/17 13:22 Dose:  4 mg


Pantoprazole Sodium (Protonix -)  40 mg PO DAILY [facility]


   Last Admin: 07/21/17 11:23 Dose:  Not Given


Petrolatum (Vaseline)  1 applic TP DAILY [facility]


   Last Admin: 07/21/17 20:39 Dose:  1 applic











- Objective


Vital Signs: 


 Vital Signs











Temperature  99.3 F   07/22/17 05:15


 


Pulse Rate  82   07/22/17 05:15


 


Respiratory Rate  20   07/22/17 05:15


 


Blood Pressure  144/77   07/22/17 05:15


 


O2 Sat by Pulse Oximetry (%)  100   07/21/17 10:00











Constitutional: Yes: Calm


Eyes: Yes: Conjunctiva Clear


Cardiovascular: Yes: Regular Rate and Rhythm


Respiratory: Yes: CTA Bilaterally


Gastrointestinal: Yes: Soft


Edema: No


Neurological: Yes: Alert


Labs: 


 CBC, BMP





 07/22/17 06:00 





 07/22/17 06:00 











Assessment/Plan


IMP:


POD # 2 s/p robotic sigmoid colectomy


CAD s/p PCI RCA 2009 w/ recent cath showing non-obstructive CAD w/ patent 

intervention site





REC:


Tolerated surgery well; remains in sinus rhythm and hemodynamically stable with 

no cardiovascular complaints.


ASA 81mg resumed.


Continue DVT prophylaxis.


Metoprolol PRN via IVPB- if tolerates PO and BP remains above goal, can start 

metoprolol tartrate 25mg BID

## 2017-07-22 NOTE — PN
Progress Note (short form)





- Note


Progress Note: 


POD #3





Had flatus and multiple bm's today


Afebrile, Vss


Abd: non-distended, soft, wounds intact with minimal tenderness, , FERN more 

serous


WBC = 10.6 < 9.8 k


creatinine = 0.9


A/P: ileus resolved


resume clear liquids


continue IVF, FERN, IS, GI, & DVT prophylaxis


OOB

## 2017-07-23 LAB
ANION GAP SERPL CALC-SCNC: 9 MMOL/L (ref 8–16)
BASOPHILS # BLD: 0.6 % (ref 0–2)
CALCIUM SERPL-MCNC: 8.2 MG/DL (ref 8.5–10.1)
CO2 SERPL-SCNC: 29 MMOL/L (ref 21–32)
CREAT SERPL-MCNC: 0.9 MG/DL (ref 0.55–1.02)
DEPRECATED RDW RBC AUTO: 14.1 % (ref 11.6–15.6)
EOSINOPHIL # BLD: 0.5 % (ref 0–4.5)
GLUCOSE SERPL-MCNC: 127 MG/DL (ref 74–106)
MCH RBC QN AUTO: 28.6 PG (ref 25.7–33.7)
MCHC RBC AUTO-ENTMCNC: 33.8 G/DL (ref 32–36)
MCV RBC: 84.6 FL (ref 80–96)
NEUTROPHILS # BLD: 76.7 % (ref 42.8–82.8)
PLATELET # BLD AUTO: 225 K/MM3 (ref 134–434)
PMV BLD: 8.2 FL (ref 7.5–11.1)
WBC # BLD AUTO: 10 K/MM3 (ref 4–10)

## 2017-07-23 RX ADMIN — FUROSEMIDE SCH: 20 TABLET ORAL at 10:38

## 2017-07-23 RX ADMIN — SIMETHICONE CHEW TAB 80 MG PRN MG: 80 TABLET ORAL at 14:57

## 2017-07-23 RX ADMIN — METOPROLOL TARTRATE PRN MG: 5 INJECTION, SOLUTION INTRAVENOUS at 07:42

## 2017-07-23 RX ADMIN — LIDOCAINE SCH PATCH: 50 PATCH TOPICAL at 10:37

## 2017-07-23 RX ADMIN — ACETAMINOPHEN PRN MG: 10 INJECTION, SOLUTION INTRAVENOUS at 02:02

## 2017-07-23 RX ADMIN — ASPIRIN SCH MG: 81 TABLET, COATED ORAL at 10:37

## 2017-07-23 RX ADMIN — METOPROLOL TARTRATE SCH MG: 25 TABLET, FILM COATED ORAL at 10:37

## 2017-07-23 RX ADMIN — Medication SCH EACH: at 21:27

## 2017-07-23 RX ADMIN — WHITE PETROLATUM SCH APPLIC: 1 OINTMENT TOPICAL at 10:39

## 2017-07-23 RX ADMIN — ACETAMINOPHEN PRN MG: 10 INJECTION, SOLUTION INTRAVENOUS at 08:28

## 2017-07-23 RX ADMIN — ACETAMINOPHEN PRN MG: 500 TABLET, FILM COATED ORAL at 21:18

## 2017-07-23 RX ADMIN — ONDANSETRON PRN MG: 2 INJECTION INTRAMUSCULAR; INTRAVENOUS at 00:46

## 2017-07-23 RX ADMIN — PANTOPRAZOLE SODIUM SCH MG: 40 TABLET, DELAYED RELEASE ORAL at 10:37

## 2017-07-23 RX ADMIN — METOPROLOL TARTRATE SCH MG: 25 TABLET, FILM COATED ORAL at 21:18

## 2017-07-23 RX ADMIN — ENOXAPARIN SODIUM SCH MG: 40 INJECTION SUBCUTANEOUS at 05:49

## 2017-07-23 RX ADMIN — TAPENTADOL HYDROCHLORIDE PRN MG: 50 TABLET, FILM COATED ORAL at 19:10

## 2017-07-23 RX ADMIN — ONDANSETRON PRN MG: 4 TABLET, ORALLY DISINTEGRATING ORAL at 19:10

## 2017-07-23 RX ADMIN — ACETAMINOPHEN PRN MG: 500 TABLET, FILM COATED ORAL at 14:57

## 2017-07-23 RX ADMIN — POTASSIUM CHLORIDE, DEXTROSE MONOHYDRATE AND SODIUM CHLORIDE SCH MLS/HR: 150; 5; 450 INJECTION, SOLUTION INTRAVENOUS at 03:47

## 2017-07-23 NOTE — PN
Progress Note, Physician


Chief Complaint: 


no chest pain or SOB


History of Present Illness: 


BP still slightly elevated





- Current Medication List


Current Medications: 


Active Medications





Acetaminophen (Ofirmev Injection -)  1,000 mg IVPB Q6H PRN


   Last Admin: 07/23/17 08:28 Dose:  1,000 mg


Albuterol Sulfate (Ventolin Hfa Inhaler -)  2 puff IH Q4H PRN


   PRN Reason: SHORT OF BREATH/WHEEZING


Aspirin (Ecotrin -)  81 mg PO DAILY [facility]


   Last Admin: 07/22/17 10:24 Dose:  81 mg


Bupropion HCl (Wellbutrin Xl -)  150 mg PO DAILY [facility]


   Last Admin: 07/22/17 10:24 Dose:  150 mg


Enoxaparin Sodium (Lovenox -)  40 mg SQ DAILY@0600 [facility]


   Last Admin: 07/23/17 05:49 Dose:  40 mg


Furosemide (Lasix -)  20 mg PO DAILY [facility]


   Last Admin: 07/22/17 10:24 Dose:  20 mg


Potassium Chloride/Dextrose/Sod Cl (D5-1/2ns+20 Meq Kcl -)  1,000 mls @ 83 mls/

hr IV ASDIR [facility]


   Last Admin: 07/23/17 03:47 Dose:  83 mls/hr


Lidocaine (Lidoderm Patch -)  1 patch TP DAILY [facility]


   Last Admin: 07/22/17 12:59 Dose:  1 patch


Metoprolol Tartrate (Lopressor -)  25 mg PO BID [facility]


Miscellaneous (Lidoderm Patch Removal)  1 each MC DAILY@2200 [facility]


   Last Admin: 07/22/17 22:15 Dose:  1 each


Ondansetron HCl (Zofran Injection)  4 mg IVPB Q6H PRN


   PRN Reason: NAUSEA AND/OR VOMITING


   Last Admin: 07/23/17 00:46 Dose:  4 mg


Pantoprazole Sodium (Protonix -)  40 mg PO DAILY [facility]


   Last Admin: 07/22/17 10:24 Dose:  40 mg


Petrolatum (Vaseline)  1 applic TP DAILY [facility]


   Last Admin: 07/22/17 10:24 Dose:  1 applic


Tramadol HCl (Ultram -)  50 mg PO Q6H PRN


   PRN Reason: PAIN


   Last Admin: 07/22/17 12:59 Dose:  50 mg











- Objective


Vital Signs: 


 Vital Signs











Temperature  98.0 F   07/23/17 06:00


 


Pulse Rate  82   07/23/17 07:42


 


Respiratory Rate  20   07/23/17 06:00


 


Blood Pressure  162/89   07/23/17 07:42


 


O2 Sat by Pulse Oximetry (%)  96   07/22/17 21:00











Constitutional: Yes: Calm


Eyes: Yes: Conjunctiva Clear


Cardiovascular: Yes: Regular Rate and Rhythm


Respiratory: Yes: CTA Bilaterally, Other (no rales or wheezing)


Gastrointestinal: Yes: Soft (no rebound or guarding)


Edema: No


Neurological: Yes: Alert, Oriented


...Motor Strength: WNL


Labs: 


 CBC, BMP





 07/23/17 06:00 





 07/23/17 06:00 





 Laboratory Tests











  07/23/17





  06:00


 


Sodium  142


 


Potassium  3.8


 


BUN  16  D


 


Creatinine  0.9


 


Random Glucose  127 H D


 


Calcium  8.2 L














Assessment/Plan


Assessment/Plan


IMP:


POD # 3 s/p robotic sigmoid colectomy


CAD s/p PCI RCA 2009 w/ recent cath showing non-obstructive CAD w/ patent 

intervention site


HTN





REC:


1.Tolerated surgery well; remains in sinus rhythm and hemodynamically stable 

with no cardiovascular complaints.


2. ASA 81mg resumed.


3. Continue DVT prophylaxis.


4. Starting Metoprolol tartrate 25mg BID for mild HTN: tolerated IV Lopressor 

well with no wheezing. Upon further questioning she states she has mild COPD 

and uses her inhalers only rarely. Doubt she will have any issues w/ 

bronchospasm.


Would avoid Ca2+ blockers post op as they can slow GI motility.


Would also avoid further up titration of diuretics as she is not fully back to 

usual oral food/liquid intake.


If additional meds needed for BP control, can add Losartan 25mg daily or 

titrate beta blocker.

## 2017-07-23 NOTE — PN
Progress Note (short form)





- Note


Progress Note: 


POD #4





Continues to pass faltus and liquid stool.


Vomited non-bilious fluid this am


Afebrile, VSS


ABD: soft, wounds intact, FERN drain serous


WBC = 10K


CREA = 0.9


A/P: doing well, vomiting likely from reflux, r/o ileus


FUA, advance to full liquids if no ileus or obstruction on FUA


D/C planning in am.

## 2017-07-24 LAB
ANION GAP SERPL CALC-SCNC: 7 MMOL/L (ref 8–16)
CALCIUM SERPL-MCNC: 8.6 MG/DL (ref 8.5–10.1)
CO2 SERPL-SCNC: 30 MMOL/L (ref 21–32)
CREAT SERPL-MCNC: 0.9 MG/DL (ref 0.55–1.02)
GLUCOSE SERPL-MCNC: 96 MG/DL (ref 74–106)
MAGNESIUM SERPL-MCNC: 1.9 MG/DL (ref 1.8–2.4)
PHOSPHATE SERPL-MCNC: 4.1 MG/DL (ref 2.5–4.9)

## 2017-07-24 RX ADMIN — METOPROLOL TARTRATE SCH MG: 25 TABLET, FILM COATED ORAL at 23:01

## 2017-07-24 RX ADMIN — FUROSEMIDE SCH MG: 20 TABLET ORAL at 11:14

## 2017-07-24 RX ADMIN — METOPROLOL TARTRATE SCH MG: 25 TABLET, FILM COATED ORAL at 11:14

## 2017-07-24 RX ADMIN — LOSARTAN POTASSIUM SCH MG: 25 TABLET, FILM COATED ORAL at 11:14

## 2017-07-24 RX ADMIN — ACETAMINOPHEN SCH MG: 325 TABLET ORAL at 17:34

## 2017-07-24 RX ADMIN — ASPIRIN SCH MG: 81 TABLET, COATED ORAL at 11:13

## 2017-07-24 RX ADMIN — Medication SCH EACH: at 23:02

## 2017-07-24 RX ADMIN — LIDOCAINE SCH PATCH: 50 PATCH TOPICAL at 11:15

## 2017-07-24 RX ADMIN — WHITE PETROLATUM SCH APPLIC: 1 OINTMENT TOPICAL at 11:28

## 2017-07-24 RX ADMIN — TAPENTADOL HYDROCHLORIDE PRN MG: 50 TABLET, FILM COATED ORAL at 11:14

## 2017-07-24 RX ADMIN — POTASSIUM CHLORIDE, DEXTROSE MONOHYDRATE AND SODIUM CHLORIDE SCH MLS/HR: 150; 5; 450 INJECTION, SOLUTION INTRAVENOUS at 17:36

## 2017-07-24 RX ADMIN — Medication SCH EACH: at 11:28

## 2017-07-24 RX ADMIN — ONDANSETRON PRN MG: 4 TABLET, ORALLY DISINTEGRATING ORAL at 10:08

## 2017-07-24 RX ADMIN — ENOXAPARIN SODIUM SCH MG: 40 INJECTION SUBCUTANEOUS at 06:39

## 2017-07-24 RX ADMIN — ACETAMINOPHEN SCH MG: 325 TABLET ORAL at 11:13

## 2017-07-24 RX ADMIN — ACETAMINOPHEN SCH MG: 325 TABLET ORAL at 23:00

## 2017-07-24 RX ADMIN — ONDANSETRON PRN MG: 2 INJECTION INTRAMUSCULAR; INTRAVENOUS at 23:00

## 2017-07-24 RX ADMIN — TAPENTADOL HYDROCHLORIDE PRN MG: 50 TABLET, FILM COATED ORAL at 23:01

## 2017-07-24 NOTE — PN
Progress Note (short form)





- Note


Progress Note: 


POD #5





Vomited this am - non-bilious fluid and partially digested food


Continues to pass flatus and had small soft stool


Afebrile, VSS


Abd: mildly distended, soft, FERN drainage serous


FUA = sb distention with presence of gas in the colon


creatinine = 0.9


K = 3.5


A/P: ILEUS incompletely resolved


Hold full liquid diet for dinner


restart IVF


Check CBC & BMP in am


NGT decompression if patient vomits


PT consult

## 2017-07-24 NOTE — PN
Progress Note, Physician


Chief Complaint: 


sitting on edge bed


C/o some abdominal pain


No vomiting.


Some nausea, held her pills down.








History of Present Illness: 


BP remains mildly elevated in 160s


Tolerated oral metoprolol without bronchospasm





- Current Medication List


Current Medications: 


Active Medications





Acetaminophen (Tylenol -)  1,000 mg PO Q6H PRN


   PRN Reason: FEVER OR PAIN


   Last Admin: 07/23/17 21:18 Dose:  1,000 mg


Albuterol Sulfate (Ventolin Hfa Inhaler -)  2 puff IH Q4H PRN


   PRN Reason: SHORT OF BREATH/WHEEZING


Aspirin (Ecotrin -)  [facility]


   Last Admin: 07/23/17 10:37 Dose:  81 mg


Bupropion HCl (Wellbutrin Xl -)  150 mg PO [facility]ILY [facility]


   Last Admin: 07/23/17 10:37 Dose:  150 mg


Enoxaparin Sodium (Lovenox -)  40 mg SQ DAILY@0600 [facility]


   Last Admin: 07/24/17 06:39 Dose:  40 mg


Furosemide (Lasix -)  20 mg PO Q2D [facility]


Lidocaine (Lidoderm Patch -)  1 patch TP [facility]ILY [facility]


   Last Admin: 07/23/17 10:37 Dose:  1 patch


Losartan Potassium (Cozaar -)  25 mg PO DAILY [facility]


Metoprolol Tartrate (Lopressor -)  25 mg PO BID [facility]


   Last Admin: 07/23/17 21:18 Dose:  25 mg


Miscellaneous (Lidoderm Patch Removal)  1 each MC DAILY@2200 [facility]


   Last Admin: 07/23/17 21:27 Dose:  1 each


Ondansetron HCl (Zofran Odt -)  4 mg SL Q6H PRN


   PRN Reason: NAUSEA AND/OR VOMITING


   Last Admin: 07/23/17 19:10 Dose:  4 mg


Pantoprazole Sodium (Protonix -)  40 mg PO DAILY [facility]


   Last Admin: 07/23/17 10:37 Dose:  40 mg


Petrolatum (Vaseline)  1 applic TP [facility]ILY [facility]


   Last Admin: 07/23/17 10:39 Dose:  1 applic


Simethicone (Mylicon -)  80 mg PO Q4H PRN


   PRN Reason: GAS


   Last Admin: 07/23/17 14:57 Dose:  80 mg


Tapentadol (Nucynta -)  50 mg PO Q6H PRN


   PRN Reason: PAIN


   Last Admin: 07/23/17 19:10 Dose:  50 mg











- Objective


Vital Signs: 


 Vital Signs











Temperature  99.0 F   07/24/17 06:00


 


Pulse Rate  75   07/24/17 06:00


 


Respiratory Rate  20   07/24/17 06:00


 


Blood Pressure  168/79   07/24/17 06:00


 


O2 Sat by Pulse Oximetry (%)  97   07/23/17 21:00











Constitutional: Yes: No Distress


Eyes: Yes: Conjunctiva Clear


Cardiovascular: Yes: Regular Rate and Rhythm


Respiratory: Yes: CTA Bilaterally (no rales or wheezing)


Gastrointestinal: Yes: Soft (mild diffuse tenderness, no rebound/guarding)


Edema: No


Neurological: Yes: Alert, Oriented


...Motor Strength: WNL


Labs: 


 CBC, BMP





 07/23/17 06:00 





 07/23/17 06:00 











Assessment/Plan


IMP:


POD #4 s/p robotic sigmoid colectomy


CAD s/p PCI RCA 2009 w/ recent cath showing non-obstructive CAD w/ patent 

intervention site


HTN





REC:


1.Tolerated surgery well; remains in sinus rhythm and hemodynamically stable 

with no cardiovascular complaints.


2. ASA 81mg resumed.


3. Continue DVT prophylaxis.


4. Continue Metoprolol tartrate 25mg BID for mild HTN: tolerated lowdose well 

with no wheezing. She states she has mild COPD and uses her inhalers only 

rarely. 


Would avoid Ca2+ blockers post op as they can slow GI motility.


Would also avoid further up titration of diuretics as she is not fully back to 

usual oral food/liquid intake.


Will add losartan 25mg daily to help achieve BP goal 140/90.

## 2017-07-24 NOTE — PN
Progress Note (short form)





- Note


Progress Note: 


POD#5





Pt with complaints of crampy abd pain, nausea without emesis. She passed flatus 

again this am and had loose stools yesterday several times. Feeling nauseated 

now and only had few bites of her food. Oob to the chair yesterday. Voiding on 

her own.





 Vital Signs











 Period  Temp  Pulse  Resp  BP Sys/Goodwin  Pulse Ox


 


 Last 24 Hr  98.4 F-99.1 F    16-20  114-168/58-91  97








FERN-290ml seroussangrenous





GEN: appears comfortable


CV: RRR


Lungs: CTA b/l


Abd: soft, inc c/d/i with staples. No erythema. All dressing changes and new 

gauze/tegaderm applied. Inc tenderness.


LE: no calf tenderness or swelling noted b/l





 CBC, BMP





 07/23/17 06:00 





 07/23/17 06:00 





AXR-7/23 revealed air in the colon, Ileus vs PSBO





A/P: 62 yo female s/p robotic sigmoid colon resection for diverticular disease


   D/w Dr. Rowe, will repeat AXR today


   Diet as tolerated, if vomiting or worsening axr may require npo/possible ngt 

insertion


   She is passing a small amount of flatus/loose bm. Limit narcotics if possible

, placed on around the clock tylenol


   Ordered BMP/mag/phos to check electrolytes


   OOB/ambulate today, cont lovenox SQ for DVT ppx

## 2017-07-24 NOTE — PN
Progress Note, Physician


History of Present Illness: 


Pt w/ vomiting today





- Current Medication List


Current Medications: 


Active Medications





Acetaminophen (Tylenol -)  1,000 mg PO Q6H PRN


   PRN Reason: FEVER OR PAIN


   Last Admin: 07/23/17 21:18 Dose:  1,000 mg


Acetaminophen (Tylenol -)  650 mg PO Q6H [facility]


   Stop: 07/26/17 09:59


   Last Admin: 07/24/17 23:00 Dose:  650 mg


Albuterol Sulfate (Ventolin Hfa Inhaler -)  2 puff IH Q4H PRN


   PRN Reason: SHORT OF BREATH/WHEEZING


Aspirin (Ecotrin -)  81 mg PO DAILY [facility]


   Last Admin: 07/24/17 11:13 Dose:  81 mg


Bupropion HCl (Wellbutrin Xl -)  150 mg PO DAILY [facility]


   Last Admin: 07/24/17 11:15 Dose:  150 mg


Enoxaparin Sodium (Lovenox -)  40 mg SQ DAILY@0600 [facility]


   Last Admin: 07/24/17 06:39 Dose:  40 mg


Furosemide (Lasix -)  20 mg PO Q2D [facility]


   Last Admin: 07/24/17 11:14 Dose:  20 mg


Potassium Chloride/Dextrose/Sod Cl (D5-1/2ns+20 Meq Kcl -)  1,000 mls @ 83 mls/

hr IV ASDIR [facility]


   Last Admin: 07/24/17 17:36 Dose:  83 mls/hr


Lidocaine (Lidoderm Patch -)  1 patch TP DAILY [facility]


   Last Admin: 07/24/17 11:15 Dose:  1 patch


Losartan Potassium (Cozaar -)  25 mg PO DAILY [facility]


   Last Admin: 07/24/17 11:14 Dose:  25 mg


Metoprolol Tartrate (Lopressor -)  25 mg PO BID [facility]


   Last Admin: 07/24/17 23:01 Dose:  25 mg


Miscellaneous (Lidoderm Patch Removal)  1 each MC DAILY@2200 [facility]


   Last Admin: 07/24/17 23:02 Dose:  1 each


Esomeprazole (Nexium ) 40 Mg Tablet (Pt's Own)  1 each PO DAILY [facility]


   Last Admin: 07/24/17 11:28 Dose:  1 each


Ondansetron HCl (Zofran Injection)  4 mg IVPB Q6H PRN


   PRN Reason: NAUSEA AND/OR VOMITING


   Last Admin: 07/24/17 23:00 Dose:  4 mg


Petrolatum (Vaseline)  1 applic TP DAILY [facility]


   Last Admin: 07/24/17 11:28 Dose:  1 applic


Simethicone (Mylicon -)  80 mg PO Q4H PRN


   PRN Reason: GAS


   Last Admin: 07/23/17 14:57 Dose:  80 mg


Tapentadol (Nucynta -)  50 mg PO Q6H PRN


   PRN Reason: PAIN


   Last Admin: 07/24/17 23:01 Dose:  50 mg











- Objective


Vital Signs: 


 Vital Signs











Temperature  97.8 F   07/24/17 18:00


 


Pulse Rate  65   07/24/17 18:00


 


Respiratory Rate  18   07/24/17 18:00


 


Blood Pressure  132/69   07/24/17 18:00


 


O2 Sat by Pulse Oximetry (%)  96   07/24/17 09:00











Constitutional: Yes: Well Nourished


HENT: Yes: WNL


Neck: Yes: WNL, Supple


Cardiovascular: Yes: WNL, Regular Rate and Rhythm


Respiratory: Yes: WNL, Regular, CTA Bilaterally


Gastrointestinal: Yes: WNL, Abdomen, Obese, Distention, Other ((+) incisional 

tenderness)


Labs: 


 CBC, BMP





 07/23/17 06:00 





 07/24/17 09:44 











Problem List





- Problems


(1) Diverticulitis


Assessment/Plan: 


S/P robotic lap colectomy w/ cystoscopy and uretheral stent placement


Abdominal XRAY w/ ileus


If no improvement many need NGT in am as per surgery


Cont IVF


Cont ambulation


Code(s): K57.92 - DVTRCLI OF INTEST, PART UNSP, W/O PERF OR ABSCESS W/O BLEED   

Qualifiers: 


     Diverticulitis site: large intestine     Diverticulitis bleeding: without 

bleeding     Diverticulitis complication: with perforation        Qualified Code

(s): K57.20 - Diverticulitis of large intestine with perforation and abscess 

without bleeding  





(2) HTN (hypertension)


Assessment/Plan: 


Cont asa/metoprolol/lasix/potassium


Code(s): I10 - ESSENTIAL (PRIMARY) HYPERTENSION   





(3) HLD (hyperlipidemia)


Code(s): E78.5 - HYPERLIPIDEMIA, UNSPECIFIED   





(4) Depression


Assessment/Plan: 


Cont welbutyrin


Code(s): F32.9 - MAJOR DEPRESSIVE DISORDER, SINGLE EPISODE, UNSPECIFIED

## 2017-07-25 LAB
ANION GAP SERPL CALC-SCNC: 6 MMOL/L (ref 8–16)
BASOPHILS # BLD: 0.5 % (ref 0–2)
CALCIUM SERPL-MCNC: 8.4 MG/DL (ref 8.5–10.1)
CO2 SERPL-SCNC: 32 MMOL/L (ref 21–32)
CREAT SERPL-MCNC: 1 MG/DL (ref 0.55–1.02)
DEPRECATED RDW RBC AUTO: 14.1 % (ref 11.6–15.6)
EOSINOPHIL # BLD: 3 % (ref 0–4.5)
GLUCOSE SERPL-MCNC: 94 MG/DL (ref 74–106)
MAGNESIUM SERPL-MCNC: 1.8 MG/DL (ref 1.8–2.4)
MCH RBC QN AUTO: 28.8 PG (ref 25.7–33.7)
MCHC RBC AUTO-ENTMCNC: 33.9 G/DL (ref 32–36)
MCV RBC: 85 FL (ref 80–96)
NEUTROPHILS # BLD: 61.7 % (ref 42.8–82.8)
PHOSPHATE SERPL-MCNC: 4.7 MG/DL (ref 2.5–4.9)
PLATELET # BLD AUTO: 254 K/MM3 (ref 134–434)
PMV BLD: 7.8 FL (ref 7.5–11.1)
WBC # BLD AUTO: 7.3 K/MM3 (ref 4–10)

## 2017-07-25 RX ADMIN — ONDANSETRON PRN MG: 2 INJECTION INTRAMUSCULAR; INTRAVENOUS at 06:37

## 2017-07-25 RX ADMIN — TAPENTADOL HYDROCHLORIDE PRN MG: 50 TABLET, FILM COATED ORAL at 10:33

## 2017-07-25 RX ADMIN — ONDANSETRON PRN MG: 2 INJECTION INTRAMUSCULAR; INTRAVENOUS at 23:00

## 2017-07-25 RX ADMIN — Medication SCH EACH: at 23:01

## 2017-07-25 RX ADMIN — ACETAMINOPHEN SCH MG: 325 TABLET ORAL at 15:03

## 2017-07-25 RX ADMIN — ACETAMINOPHEN SCH MG: 325 TABLET ORAL at 06:33

## 2017-07-25 RX ADMIN — WHITE PETROLATUM SCH APPLIC: 1 OINTMENT TOPICAL at 10:33

## 2017-07-25 RX ADMIN — LIDOCAINE SCH PATCH: 50 PATCH TOPICAL at 10:31

## 2017-07-25 RX ADMIN — TAPENTADOL HYDROCHLORIDE PRN MG: 50 TABLET, FILM COATED ORAL at 16:48

## 2017-07-25 RX ADMIN — ACETAMINOPHEN SCH MG: 325 TABLET ORAL at 23:00

## 2017-07-25 RX ADMIN — POTASSIUM CHLORIDE, DEXTROSE MONOHYDRATE AND SODIUM CHLORIDE SCH MLS/HR: 150; 5; 450 INJECTION, SOLUTION INTRAVENOUS at 18:16

## 2017-07-25 RX ADMIN — POTASSIUM CHLORIDE, DEXTROSE MONOHYDRATE AND SODIUM CHLORIDE SCH MLS/HR: 150; 5; 450 INJECTION, SOLUTION INTRAVENOUS at 06:32

## 2017-07-25 RX ADMIN — METOPROLOL TARTRATE SCH MG: 25 TABLET, FILM COATED ORAL at 23:01

## 2017-07-25 RX ADMIN — ACETAMINOPHEN SCH: 325 TABLET ORAL at 10:32

## 2017-07-25 RX ADMIN — LOSARTAN POTASSIUM SCH MG: 25 TABLET, FILM COATED ORAL at 10:31

## 2017-07-25 RX ADMIN — Medication SCH EACH: at 10:32

## 2017-07-25 RX ADMIN — METOPROLOL TARTRATE SCH MG: 25 TABLET, FILM COATED ORAL at 10:32

## 2017-07-25 RX ADMIN — ASPIRIN SCH MG: 81 TABLET, COATED ORAL at 10:31

## 2017-07-25 RX ADMIN — TAPENTADOL HYDROCHLORIDE PRN MG: 50 TABLET, FILM COATED ORAL at 23:00

## 2017-07-25 RX ADMIN — ENOXAPARIN SODIUM SCH MG: 40 INJECTION SUBCUTANEOUS at 06:33

## 2017-07-25 NOTE — PN
Progress Note (short form)





- Note


Progress Note: 


POD #6


Patient continues to pass faltus but no bm today


Denies nausea and had no recurrence of vomiting


Afebrile, VSS


Abd: soft, no tenderness, FERN draining serous fluid


WBC = 7.3 K


K+ = 3.8


A/P: resolving ileus


resume clear liquids


possible D/C IN AM

## 2017-07-25 NOTE — PN
Progress Note, Physician





- Current Medication List


Current Medications: 


Active Medications





Acetaminophen (Tylenol -)  1,000 mg PO Q6H PRN


   PRN Reason: FEVER OR PAIN


   Last Admin: 07/23/17 21:18 Dose:  1,000 mg


Acetaminophen (Tylenol -)  650 mg PO Q6H [facility]


   Stop: 07/26/17 09:59


   Last Admin: 07/25/17 15:03 Dose:  650 mg


Albuterol Sulfate (Ventolin Hfa Inhaler -)  2 puff IH Q4H PRN


   PRN Reason: SHORT OF BREATH/WHEEZING


Aspirin (Ecotrin -)  81 mg PO DAILY [facility]


   Last Admin: 07/25/17 10:31 Dose:  81 mg


Bupropion HCl (Wellbutrin Xl -)  150 mg PO DAILY [facility]


   Last Admin: 07/25/17 10:33 Dose:  150 mg


Enoxaparin Sodium (Lovenox -)  40 mg SQ DAILY@0600 [facility]


   Last Admin: 07/25/17 06:33 Dose:  40 mg


Furosemide (Lasix -)  20 mg PO Q2D [facility]


   Last Admin: 07/24/17 11:14 Dose:  20 mg


Potassium Chloride/Dextrose/Sod Cl (D5-1/2ns+20 Meq Kcl -)  1,000 mls @ 83 mls/

hr IV ASDIR [facility]


   Last Admin: 07/25/17 18:16 Dose:  83 mls/hr


Lidocaine (Lidoderm Patch -)  1 patch TP DAILY [facility]


   Last Admin: 07/25/17 10:31 Dose:  1 patch


Losartan Potassium (Cozaar -)  25 mg PO DAILY [facility]


   Last Admin: 07/25/17 10:31 Dose:  25 mg


Metoprolol Tartrate (Lopressor -)  25 mg PO BID [facility]


   Last Admin: 07/25/17 10:32 Dose:  25 mg


Miscellaneous (Lidoderm Patch Removal)  1 each MC DAILY@2200 [facility]


   Last Admin: 07/24/17 23:02 Dose:  1 each


Esomeprazole (Nexium ) 40 Mg Tablet (Pt's Own)  1 each PO DAILY [facility]


   Last Admin: 07/25/17 10:32 Dose:  1 each


Ondansetron HCl (Zofran Injection)  4 mg IVPB Q6H PRN


   PRN Reason: NAUSEA AND/OR VOMITING


   Last Admin: 07/25/17 06:37 Dose:  4 mg


Petrolatum (Vaseline)  1 applic TP DAILY [facility]


   Last Admin: 07/25/17 10:33 Dose:  1 applic


Simethicone (Mylicon -)  80 mg PO Q4H PRN


   PRN Reason: GAS


   Last Admin: 07/23/17 14:57 Dose:  80 mg


Tapentadol (Nucynta -)  50 mg PO Q6H PRN


   PRN Reason: PAIN


   Last Admin: 07/25/17 16:48 Dose:  50 mg











- Objective


Vital Signs: 


 Vital Signs











Temperature  99.1 F   07/25/17 18:00


 


Pulse Rate  62   07/25/17 18:00


 


Respiratory Rate  18   07/25/17 18:00


 


Blood Pressure  121/70   07/25/17 18:00


 


O2 Sat by Pulse Oximetry (%)  98   07/25/17 09:00











Labs: 


 CBC, BMP





 07/25/17 06:00 





 07/25/17 06:00 











Problem List





- Problems


(1) Diverticulitis


Code(s): K57.92 - DVTRCLI OF INTEST, PART UNSP, W/O PERF OR ABSCESS W/O BLEED   

Qualifiers: 


     Diverticulitis site: large intestine     Diverticulitis bleeding: without 

bleeding     Diverticulitis complication: with perforation        Qualified Code

(s): K57.20 - Diverticulitis of large intestine with perforation and abscess 

without bleeding  





(2) HTN (hypertension)


Code(s): I10 - ESSENTIAL (PRIMARY) HYPERTENSION   





(3) HLD (hyperlipidemia)


Code(s): E78.5 - HYPERLIPIDEMIA, UNSPECIFIED   





(4) Depression


Code(s): F32.9 - MAJOR DEPRESSIVE DISORDER, SINGLE EPISODE, UNSPECIFIED

## 2017-07-25 NOTE — PN
Progress Note, Physician


Chief Complaint: 


notes reviewed


May need NGT








History of Present Illness: 


BP improved.





- Current Medication List


Current Medications: 


Active Medications





Acetaminophen (Tylenol -)  1,000 mg PO Q6H PRN


   PRN Reason: FEVER OR PAIN


   Last Admin: 07/23/17 21:18 Dose:  1,000 mg


Acetaminophen (Tylenol -)  650 mg PO Q6H [facility]


   Stop: 07/26/17 09:59


   Last Admin: 07/25/17 06:33 Dose:  650 mg


Albuterol Sulfate (Ventolin Hfa Inhaler -)  2 puff IH Q4H PRN


   PRN Reason: SHORT OF BREATH/WHEEZING


Aspirin (Ecotrin -)  81 mg PO DAILY [facility]


   Last Admin: 07/24/17 11:13 Dose:  81 mg


Bupropion HCl (Wellbutrin Xl -)  150 mg PO DAILY [facility]


   Last Admin: 07/24/17 11:15 Dose:  150 mg


Enoxaparin Sodium (Lovenox -)  40 mg SQ DAILY@0600 [facility]


   Last Admin: 07/25/17 06:33 Dose:  40 mg


Furosemide (Lasix -)  20 mg PO Q2D [facility]


   Last Admin: 07/24/17 11:14 Dose:  20 mg


Potassium Chloride/Dextrose/Sod Cl (D5-1/2ns+20 Meq Kcl -)  1,000 mls @ 83 mls/

hr IV ASDIR [facility]


   Last Admin: 07/25/17 06:32 Dose:  83 mls/hr


Lidocaine (Lidoderm Patch -)  1 patch TP DAILY [facility]


   Last Admin: 07/24/17 11:15 Dose:  1 patch


Losartan Potassium (Cozaar -)  25 mg PO DAILY [facility]


   Last Admin: 07/24/17 11:14 Dose:  25 mg


Metoprolol Tartrate (Lopressor -)  25 mg PO BID [facility]


   Last Admin: 07/24/17 23:01 Dose:  25 mg


Miscellaneous (Lidoderm Patch Removal)  1 each MC DAILY@2200 [facility]


   Last Admin: 07/24/17 23:02 Dose:  1 each


Esomeprazole (Nexium ) 40 Mg Tablet (Pt's Own)  1 each PO DAILY [facility]


   Last Admin: 07/24/17 11:28 Dose:  1 each


Ondansetron HCl (Zofran Injection)  4 mg IVPB Q6H PRN


   PRN Reason: NAUSEA AND/OR VOMITING


   Last Admin: 07/25/17 06:37 Dose:  4 mg


Petrolatum (Vaseline)  1 applic TP DAILY [facility]


   Last Admin: 07/24/17 11:28 Dose:  1 applic


Simethicone (Mylicon -)  80 mg PO Q4H PRN


   PRN Reason: GAS


   Last Admin: 07/23/17 14:57 Dose:  80 mg


Tapentadol (Nucynta -)  50 mg PO Q6H PRN


   PRN Reason: PAIN


   Last Admin: 07/24/17 23:01 Dose:  50 mg











- Objective


Vital Signs: 


 Vital Signs











Temperature  98.6 F   07/25/17 06:00


 


Pulse Rate  67   07/25/17 06:00


 


Respiratory Rate  20   07/25/17 06:00


 


Blood Pressure  144/73   07/25/17 06:00


 


O2 Sat by Pulse Oximetry (%)  96   07/24/17 21:00











Constitutional: Yes: Calm


Eyes: Yes: Conjunctiva Clear


Cardiovascular: Yes: Regular Rate and Rhythm


Respiratory: Yes: CTA Bilaterally


Gastrointestinal: Yes: Soft (decreased bowel sounds, nontender)


Edema: No


Neurological: Yes: Alert


Labs: 


 CBC, BMP





 07/25/17 06:00 





 07/25/17 06:00 





 Laboratory Tests











  07/25/17 07/25/17





  06:00 06:00


 


WBC   7.3


 


Hct   32.9


 


Plt Count   254


 


Sodium  142 


 


Potassium  3.8 


 


BUN  13 


 


Creatinine  1.0 


 


Magnesium  1.8 














Assessment/Plan


IMP:


POD #4 s/p robotic sigmoid colectomy now with partial ileus


CAD s/p PCI RCA 2009 w/ recent cath showing non-obstructive CAD w/ patent 

intervention site


HTN





REC:


1.Tolerated surgery well; remains in sinus rhythm and hemodynamically stable 

with no cardiovascular complaints.


2. ASA 81mg resumed.


3. Continue DVT prophylaxis.


4. Continue Metoprolol tartrate 25mg BID for mild HTN: tolerated low dose well 

with no wheezing. She states she has mild COPD and uses her inhalers only 

rarely. 


Would avoid Ca2+ blockers post op as they can slow GI motility.


Would also avoid further up titration of diuretics as she is not fully back to 

usual oral food/liquid intake.


Losartan 25mg added yesterday with good effect, BP trend shows average BP at 

goal. 


Surgery following for possible NGT.

## 2017-07-26 RX ADMIN — METOPROLOL TARTRATE SCH MG: 25 TABLET, FILM COATED ORAL at 21:45

## 2017-07-26 RX ADMIN — ACETAMINOPHEN SCH MG: 325 TABLET ORAL at 06:11

## 2017-07-26 RX ADMIN — SIMETHICONE CHEW TAB 80 MG PRN MG: 80 TABLET ORAL at 10:18

## 2017-07-26 RX ADMIN — Medication SCH: at 10:13

## 2017-07-26 RX ADMIN — ASPIRIN SCH MG: 81 TABLET, COATED ORAL at 10:17

## 2017-07-26 RX ADMIN — TAPENTADOL HYDROCHLORIDE PRN MG: 50 TABLET, FILM COATED ORAL at 06:11

## 2017-07-26 RX ADMIN — METOPROLOL TARTRATE SCH MG: 25 TABLET, FILM COATED ORAL at 10:17

## 2017-07-26 RX ADMIN — ONDANSETRON PRN MG: 2 INJECTION INTRAMUSCULAR; INTRAVENOUS at 13:03

## 2017-07-26 RX ADMIN — ENOXAPARIN SODIUM SCH MG: 40 INJECTION SUBCUTANEOUS at 06:12

## 2017-07-26 RX ADMIN — Medication SCH EACH: at 08:43

## 2017-07-26 RX ADMIN — TAPENTADOL HYDROCHLORIDE PRN MG: 50 TABLET, FILM COATED ORAL at 13:03

## 2017-07-26 RX ADMIN — LIDOCAINE SCH PATCH: 50 PATCH TOPICAL at 10:19

## 2017-07-26 RX ADMIN — WHITE PETROLATUM SCH APPLIC: 1 OINTMENT TOPICAL at 10:18

## 2017-07-26 RX ADMIN — METOPROLOL TARTRATE SCH MG: 25 TABLET, FILM COATED ORAL at 22:33

## 2017-07-26 RX ADMIN — Medication SCH EACH: at 21:54

## 2017-07-26 RX ADMIN — LOSARTAN POTASSIUM SCH MG: 25 TABLET, FILM COATED ORAL at 10:17

## 2017-07-26 RX ADMIN — FUROSEMIDE SCH MG: 20 TABLET ORAL at 10:17

## 2017-07-26 NOTE — HOSP
Subjective





- Review of Symptoms


Events since last encounter: 


NGT at [facility]. Informed by RN that Dr. Rowe requested Hospitalists to 

advance tube.





Physical Examination


Vital Signs: 


 Vital Signs











Temperature  99.4 F   07/26/17 18:00


 


Pulse Rate  67   07/26/17 18:00


 


Respiratory Rate  18   07/26/17 18:00


 


Blood Pressure  151/70   07/26/17 18:00


 


O2 Sat by Pulse Oximetry (%)  98   07/26/17 09:00











Labs: 


 CBC, BMP





 07/25/17 06:00 





 07/25/17 06:00 











Hospitalist Encounter


Assessment: 


NGT retracted, and then advanced; Clinically auscultated that tube was in 

stomach; gas bubbles were appreciated. Bilious drainage started. CXR confirmed 

placement in stomach.





Patient complaining of pain:


-gave low dose fentanyl 25mcg IVPUSH ONCE


-gave Cepacol lonzenge once





Patient seen and discussed with Dr. Bustamante and Dr. ADILSON DRUMMOND MD


PGY-1








Visit type





- Emergency Visit


Emergency Visit: No





- New Patient


This patient is new to me today: Yes


Date on this admission: 07/27/17





- Critical Care


Critical Care patient: No

## 2017-07-26 NOTE — PN
Progress Note (short form)





- Note


Progress Note: 


Patient had bilious vomiting this pm


Continues to pass flatus


FUA - DISTENDED SB LOOPS


Afebrile, VSS


ABD: soft, wounds intact, serous mallory drainage


A/P: R/O ILEUS VS. POST-OP SBO


for NGT insertion and decompression


CT SCAN ABD/PELVIS in am


NPO, IVF, LABS in am

## 2017-07-26 NOTE — PN
Progress Note, Physician


Chief Complaint: 


no chest pain or SOB





- Current Medication List


Current Medications: 


Active Medications





Acetaminophen (Tylenol -)  1,000 mg PO Q6H PRN


   PRN Reason: FEVER OR PAIN


   Last Admin: 07/23/17 21:18 Dose:  1,000 mg


Acetaminophen (Tylenol -)  650 mg PO Q6H Pending sale to [facility]


   Stop: 07/26/17 09:59


   Last Admin: 07/26/17 06:11 Dose:  650 mg


Albuterol Sulfate (Ventolin Hfa Inhaler -)  2 puff IH Q4H PRN


   PRN Reason: SHORT OF BREATH/WHEEZING


Aspirin (Ecotrin -)  81 mg PO DAILY Pending sale to [facility]


   Last Admin: 07/25/17 10:31 Dose:  81 mg


Bupropion HCl (Wellbutrin Xl -)  150 mg PO DAILY Pending sale to [facility]


   Last Admin: 07/25/17 10:33 Dose:  150 mg


Enoxaparin Sodium (Lovenox -)  40 mg SQ DAILY@0600 Pending sale to [facility]


   Last Admin: 07/26/17 06:12 Dose:  40 mg


Furosemide (Lasix -)  20 mg PO Q2D Pending sale to [facility]


   Last Admin: 07/24/17 11:14 Dose:  20 mg


Potassium Chloride/Dextrose/Sod Cl (D5-1/2ns+20 Meq Kcl -)  1,000 mls @ 83 mls/

hr IV ASDIR Pending sale to [facility]


   Last Admin: 07/25/17 18:16 Dose:  83 mls/hr


Lidocaine (Lidoderm Patch -)  1 patch TP DAILY Pending sale to [facility]


   Last Admin: 07/25/17 10:31 Dose:  1 patch


Losartan Potassium (Cozaar -)  25 mg PO DAILY Pending sale to [facility]


   Last Admin: 07/25/17 10:31 Dose:  25 mg


Metoprolol Tartrate (Lopressor -)  25 mg PO BID Pending sale to [facility]


   Last Admin: 07/25/17 23:01 Dose:  25 mg


Miscellaneous (Lidoderm Patch Removal)  1 each MC DAILY@2200 Pending sale to [facility]


   Last Admin: 07/25/17 23:01 Dose:  1 each


Esomeprazole (Nexium ) 40 Mg Tablet (Pt's Own)  1 each PO DAILY Pending sale to [facility]


   Last Admin: 07/25/17 10:32 Dose:  1 each


Ondansetron HCl (Zofran Injection)  4 mg IVPB Q6H PRN


   PRN Reason: NAUSEA AND/OR VOMITING


   Last Admin: 07/25/17 23:00 Dose:  4 mg


Petrolatum (Vaseline)  1 applic TP DAILY [facility]


   Last Admin: 07/25/17 10:33 Dose:  1 applic


Simethicone (Mylicon -)  80 mg PO Q4H PRN


   PRN Reason: GAS


   Last Admin: 07/23/17 14:57 Dose:  80 mg


Tapentadol (Nucynta -)  50 mg PO Q6H PRN


   PRN Reason: PAIN


   Last Admin: 07/26/17 06:11 Dose:  50 mg











- Objective


Vital Signs: 


 Vital Signs











Temperature  99.1 F   07/26/17 07:53


 


Pulse Rate  66   07/26/17 07:53


 


Respiratory Rate  20   07/26/17 07:53


 


Blood Pressure  147/75   07/26/17 07:53


 


O2 Sat by Pulse Oximetry (%)  98   07/25/17 21:00











Constitutional: Yes: No Distress


Eyes: Yes: Conjunctiva Clear


Cardiovascular: Yes: Regular Rate and Rhythm


Respiratory: Yes: CTA Bilaterally


Edema: No


Neurological: Yes: Alert, Oriented


...Motor Strength: WNL


Labs: 


 CBC, BMP





 07/25/17 06:00 





 07/25/17 06:00 











Assessment/Plan


IMP:


POD #5 s/p robotic sigmoid colectomy now with partial ileus


CAD s/p PCI RCA 2009 w/ recent cath showing non-obstructive CAD w/ patent 

intervention site


HTN





REC:


1.Tolerated surgery well; remains in sinus rhythm and hemodynamically stable 

with no cardiovascular complaints.


2. ASA 81mg resumed.


3. Continue DVT prophylaxis.


4. Continue Metoprolol tartrate 25mg BID for mild HTN: tolerated low dose well 

with no wheezing. She states she has mild COPD and uses her inhalers only 

rarely. 


Would avoid Ca2+ blockers post op as they can slow GI motility.


Would also avoid further up titration of diuretics as she is not fully back to 

usual oral food/liquid intake.


Losartan 25mg added with good effect, BP trend shows average BP at goal. 


Resolving ileus, further management as per surgery.

## 2017-07-27 LAB
ALBUMIN SERPL-MCNC: 3.1 G/DL (ref 3.4–5)
ALP SERPL-CCNC: 83 U/L (ref 45–117)
ALT SERPL-CCNC: 41 U/L (ref 12–78)
ANION GAP SERPL CALC-SCNC: 10 MMOL/L (ref 8–16)
AST SERPL-CCNC: 38 U/L (ref 15–37)
BASOPHILS # BLD: 0.4 % (ref 0–2)
BILIRUB SERPL-MCNC: 0.4 MG/DL (ref 0.2–1)
CALCIUM SERPL-MCNC: 8.2 MG/DL (ref 8.5–10.1)
CO2 SERPL-SCNC: 30 MMOL/L (ref 21–32)
CREAT SERPL-MCNC: 0.9 MG/DL (ref 0.55–1.02)
DEPRECATED RDW RBC AUTO: 14.2 % (ref 11.6–15.6)
EOSINOPHIL # BLD: 2.6 % (ref 0–4.5)
GLUCOSE SERPL-MCNC: 108 MG/DL (ref 74–106)
MAGNESIUM SERPL-MCNC: 1.7 MG/DL (ref 1.8–2.4)
MCH RBC QN AUTO: 28.6 PG (ref 25.7–33.7)
MCHC RBC AUTO-ENTMCNC: 33.4 G/DL (ref 32–36)
MCV RBC: 85.7 FL (ref 80–96)
NEUTROPHILS # BLD: 71.5 % (ref 42.8–82.8)
PHOSPHATE SERPL-MCNC: 3.6 MG/DL (ref 2.5–4.9)
PLATELET # BLD AUTO: 265 K/MM3 (ref 134–434)
PMV BLD: 8 FL (ref 7.5–11.1)
PROT SERPL-MCNC: 6.1 G/DL (ref 6.4–8.2)
WBC # BLD AUTO: 8 K/MM3 (ref 4–10)

## 2017-07-27 RX ADMIN — LOSARTAN POTASSIUM SCH MG: 25 TABLET, FILM COATED ORAL at 10:02

## 2017-07-27 RX ADMIN — ACETAMINOPHEN PRN MG: 10 INJECTION, SOLUTION INTRAVENOUS at 21:55

## 2017-07-27 RX ADMIN — METOPROLOL TARTRATE SCH MG: 25 TABLET, FILM COATED ORAL at 10:02

## 2017-07-27 RX ADMIN — HEPARIN SODIUM SCH UNIT: 5000 INJECTION, SOLUTION INTRAVENOUS; SUBCUTANEOUS at 21:14

## 2017-07-27 RX ADMIN — FENTANYL TRANSDERMAL SCH PATCH: 25 PATCH, EXTENDED RELEASE TRANSDERMAL at 14:33

## 2017-07-27 RX ADMIN — BENZOCAINE AND MENTHOL PRN EACH: 15; 3.6 LOZENGE ORAL at 06:35

## 2017-07-27 RX ADMIN — Medication SCH EACH: at 21:15

## 2017-07-27 RX ADMIN — ACETAMINOPHEN PRN MG: 10 INJECTION, SOLUTION INTRAVENOUS at 13:42

## 2017-07-27 RX ADMIN — POTASSIUM CHLORIDE, DEXTROSE MONOHYDRATE AND SODIUM CHLORIDE SCH: 150; 5; 450 INJECTION, SOLUTION INTRAVENOUS at 21:15

## 2017-07-27 RX ADMIN — LIDOCAINE SCH PATCH: 50 PATCH TOPICAL at 10:03

## 2017-07-27 RX ADMIN — ENOXAPARIN SODIUM SCH: 40 INJECTION SUBCUTANEOUS at 06:39

## 2017-07-27 RX ADMIN — Medication SCH EACH: at 10:03

## 2017-07-27 RX ADMIN — ASPIRIN SCH MG: 81 TABLET, COATED ORAL at 10:02

## 2017-07-27 RX ADMIN — POTASSIUM CHLORIDE, DEXTROSE MONOHYDRATE AND SODIUM CHLORIDE SCH MLS/HR: 150; 5; 450 INJECTION, SOLUTION INTRAVENOUS at 10:29

## 2017-07-27 RX ADMIN — BENZOCAINE AND MENTHOL PRN EACH: 15; 3.6 LOZENGE ORAL at 13:47

## 2017-07-27 RX ADMIN — ACETAMINOPHEN PRN MG: 10 INJECTION, SOLUTION INTRAVENOUS at 07:28

## 2017-07-27 RX ADMIN — METOPROLOL TARTRATE SCH MG: 25 TABLET, FILM COATED ORAL at 21:14

## 2017-07-27 RX ADMIN — WHITE PETROLATUM SCH APPLIC: 1 OINTMENT TOPICAL at 10:04

## 2017-07-27 RX ADMIN — HEPARIN SODIUM SCH UNIT: 5000 INJECTION, SOLUTION INTRAVENOUS; SUBCUTANEOUS at 10:02

## 2017-07-27 RX ADMIN — BENZOCAINE AND MENTHOL PRN EACH: 15; 3.6 LOZENGE ORAL at 21:15

## 2017-07-27 RX ADMIN — ACETAMINOPHEN PRN MG: 10 INJECTION, SOLUTION INTRAVENOUS at 01:21

## 2017-07-27 NOTE — PN
Progress Note (short form)





- Note


Progress Note: 


POD # 8





NGT drained 800 ml overnight


States that she is not passing flatus


C/O sore throat and crampy abdominal pain


Afebrile, VSS


Abd: mildly distended, soft, FERN drainage serous


WBC = 8K, CREA = 0.9


Mg = 1.7


A/P: POST-OP ILEUS VS. SBO


CT A/P today


continue NGT DECOMPRESSION


Correct electrolyte imbalance

## 2017-07-27 NOTE — PN
Progress Note, Physician


Chief Complaint: 


NGT required, surgery note reviewed














- Current Medication List


Current Medications: 


Active Medications





Acetaminophen (Tylenol -)  1,000 mg PO Q6H PRN


   PRN Reason: FEVER OR PAIN


   Last Admin: 07/23/17 21:18 Dose:  1,000 mg


Acetaminophen (Ofirmev Injection -)  1,000 mg IVPB Q6H PRN


   Last Admin: 07/27/17 07:28 Dose:  1,000 mg


Albuterol Sulfate (Ventolin Hfa Inhaler -)  2 puff IH Q4H PRN


   PRN Reason: SHORT OF BREATH/WHEEZING


Aspirin (Ecotrin -)  81 mg PO DAILY [facility]


   Last Admin: 07/26/17 10:17 Dose:  81 mg


Benzocaine/Menthol (Cepacol Lozenge -)  1 each MM Q6H PRN


   PRN Reason: SORE THROAT


   Last Admin: 07/27/17 06:35 Dose:  1 each


Bupropion HCl (Wellbutrin Xl -)  150 mg PO DAILY [facility]


   Last Admin: 07/26/17 10:17 Dose:  150 mg


Furosemide (Lasix -)  20 mg PO Q2D [facility]


   Last Admin: 07/26/17 10:17 Dose:  20 mg


Potassium Chloride/Dextrose/Sod Cl (D5-1/2ns+20 Meq Kcl -)  1,000 mls @ 83 mls/

hr IV ASDIR [facility]


   Last Admin: 07/25/17 18:16 Dose:  83 mls/hr


Lidocaine (Lidoderm Patch -)  1 patch TP DAILY [facility]


   Last Admin: 07/26/17 10:19 Dose:  1 patch


Losartan Potassium (Cozaar -)  25 mg PO DAILY [facility]


   Last Admin: 07/26/17 10:17 Dose:  25 mg


Metoprolol Tartrate (Lopressor -)  25 mg PO BID [facility]


   Last Admin: 07/26/17 22:33 Dose:  25 mg


Miscellaneous (Lidoderm Patch Removal)  1 each MC DAILY@2200 [facility]


   Last Admin: 07/26/17 21:54 Dose:  1 each


Esomeprazole (Nexium ) 40 Mg Tablet (Pt's Own)  1 each PO DAILY [facility]


   Last Admin: 07/26/17 10:13 Dose:  Not Given


Ondansetron HCl (Zofran Injection)  4 mg IVPB Q6H PRN


   PRN Reason: NAUSEA AND/OR VOMITING


   Last Admin: 07/26/17 13:03 Dose:  4 mg


Petrolatum (Vaseline)  1 applic TP DAILY [facility]


   Last Admin: 07/26/17 10:18 Dose:  1 applic


Simethicone (Mylicon -)  80 mg PO Q4H PRN


   PRN Reason: GAS


   Last Admin: 07/26/17 10:18 Dose:  80 mg











- Objective


Vital Signs: 


 Vital Signs











Temperature  99.4 F   07/27/17 06:57


 


Pulse Rate  76   07/27/17 06:57


 


Respiratory Rate  20   07/27/17 06:57


 


Blood Pressure  154/84   07/27/17 06:57


 


O2 Sat by Pulse Oximetry (%)  98   07/26/17 21:00











Eyes: Yes: Conjunctiva Clear


HENT: Yes: Other (Draining NGT)


Cardiovascular: Yes: Regular Rate and Rhythm


Respiratory: Yes: CTA Bilaterally (no wheezing)


Gastrointestinal: Yes: Abdomen, Obese (decreased bowel sounds)


Edema: No


Neurological: Yes: Alert, Oriented


Labs: 


 CBC, BMP





 07/27/17 06:00 





 Laboratory Tests











  07/27/17 07/27/17





  06:00 06:00


 


WBC  8.0 


 


Hgb  11.9 


 


Hct  35.5 


 


Plt Count  265 


 


Sodium   Pending


 


Potassium   Pending


 


Creatinine   Pending














Assessment/Plan


IMP:


POD #6 s/p robotic sigmoid colectomy now with SBO requiring NGT


CAD s/p PCI RCA 2009 w/ recent cath showing non-obstructive CAD w/ patent 

intervention site


HTN





REC:


Now that NGT placed, she may not be able to tolerate PO meds.


Will renew PRN order for Metoprolol IVPB PRN with parameters.

## 2017-07-28 LAB
ANION GAP SERPL CALC-SCNC: 7 MMOL/L (ref 8–16)
CALCIUM SERPL-MCNC: 8.6 MG/DL (ref 8.5–10.1)
CO2 SERPL-SCNC: 33 MMOL/L (ref 21–32)
CREAT SERPL-MCNC: 0.9 MG/DL (ref 0.55–1.02)
GLUCOSE SERPL-MCNC: 98 MG/DL (ref 74–106)
MAGNESIUM SERPL-MCNC: 2.4 MG/DL (ref 1.8–2.4)

## 2017-07-28 RX ADMIN — BENZOCAINE AND MENTHOL PRN EACH: 15; 3.6 LOZENGE ORAL at 05:45

## 2017-07-28 RX ADMIN — ASPIRIN SCH MG: 81 TABLET, COATED ORAL at 09:34

## 2017-07-28 RX ADMIN — HEPARIN SODIUM SCH UNIT: 5000 INJECTION, SOLUTION INTRAVENOUS; SUBCUTANEOUS at 21:18

## 2017-07-28 RX ADMIN — Medication SCH EACH: at 21:22

## 2017-07-28 RX ADMIN — BENZOCAINE AND MENTHOL PRN EACH: 15; 3.6 LOZENGE ORAL at 14:32

## 2017-07-28 RX ADMIN — LOSARTAN POTASSIUM SCH MG: 25 TABLET, FILM COATED ORAL at 09:34

## 2017-07-28 RX ADMIN — LIDOCAINE SCH PATCH: 50 PATCH TOPICAL at 09:34

## 2017-07-28 RX ADMIN — Medication SCH EACH: at 09:35

## 2017-07-28 RX ADMIN — WHITE PETROLATUM SCH APPLIC: 1 OINTMENT TOPICAL at 09:35

## 2017-07-28 RX ADMIN — FUROSEMIDE SCH MG: 20 TABLET ORAL at 09:34

## 2017-07-28 RX ADMIN — METOPROLOL TARTRATE SCH MG: 25 TABLET, FILM COATED ORAL at 09:34

## 2017-07-28 RX ADMIN — ACETAMINOPHEN PRN MG: 10 INJECTION, SOLUTION INTRAVENOUS at 20:35

## 2017-07-28 RX ADMIN — HEPARIN SODIUM SCH UNIT: 5000 INJECTION, SOLUTION INTRAVENOUS; SUBCUTANEOUS at 09:38

## 2017-07-28 RX ADMIN — SIMETHICONE CHEW TAB 80 MG PRN MG: 80 TABLET ORAL at 09:34

## 2017-07-28 RX ADMIN — METOPROLOL TARTRATE SCH MG: 25 TABLET, FILM COATED ORAL at 21:18

## 2017-07-28 RX ADMIN — POTASSIUM CHLORIDE, DEXTROSE MONOHYDRATE AND SODIUM CHLORIDE SCH MLS/HR: 150; 5; 450 INJECTION, SOLUTION INTRAVENOUS at 03:09

## 2017-07-28 RX ADMIN — BENZOCAINE AND MENTHOL PRN EACH: 15; 3.6 LOZENGE ORAL at 20:53

## 2017-07-28 RX ADMIN — ACETAMINOPHEN PRN MG: 10 INJECTION, SOLUTION INTRAVENOUS at 09:37

## 2017-07-28 NOTE — PN
Progress Note (short form)





- Note


Progress Note: 


POD #9





HAD MULTIPLE BM'S(3) AFTER CT SCAN YESTERDAY AND TODAY


ALSO PASSING FLATUS


AFEBRILE, VSS


ABD: SOFT, WOUNDS INTACT, FERN DRAINAGE REMAINS SEROUS


K+ = 4.1, MG+ = 2.4


A/P RESOLVING POST-OP SBO


FUA IN AM


CONTINUE NGT, IVF

## 2017-07-28 NOTE — PN
Progress Note, Physician


Chief Complaint: 


NGT in place, feels better


CT confirms partial SBO





Electrolytes repleted.











- Current Medication List


Current Medications: 


Active Medications





Acetaminophen (Tylenol -)  1,000 mg PO Q6H PRN


   PRN Reason: FEVER OR PAIN


   Last Admin: 07/23/17 21:18 Dose:  1,000 mg


Acetaminophen (Ofirmev Injection -)  1,000 mg IVPB Q6H PRN


   Last Admin: 07/27/17 21:55 Dose:  1,000 mg


Albuterol Sulfate (Ventolin Hfa Inhaler -)  2 puff IH Q4H PRN


   PRN Reason: SHORT OF BREATH/WHEEZING


Aspirin (Ecotrin -)  81 mg PO DAILY [facility]


   Last Admin: 07/27/17 10:02 Dose:  81 mg


Benzocaine/Menthol (Cepacol Lozenge -)  1 each MM Q6H PRN


   PRN Reason: SORE THROAT


   Last Admin: 07/28/17 05:45 Dose:  1 each


Bupropion HCl (Wellbutrin Xl -)  150 mg PO DAILY [facility]


   Last Admin: 07/27/17 10:05 Dose:  Not Given


Fentanyl (Duragesic 25mcg Patch -)  1 patch TD Q72H [facility]


   Stop: 08/03/17 14:00


   Last Admin: 07/27/17 14:33 Dose:  1 patch


Furosemide (Lasix -)  20 mg PO Q2D [facility]


   Last Admin: 07/26/17 10:17 Dose:  20 mg


Heparin Sodium (Porcine) (Heparin -)  5,000 unit SQ BID [facility]


   Last Admin: 07/27/17 21:14 Dose:  5,000 unit


Potassium Chloride/Dextrose/Sod Cl (D5-1/2ns+20 Meq Kcl -)  1,000 mls @ 83 mls/

hr IV ASDIR [facility]


   Last Admin: 07/28/17 03:09 Dose:  83 mls/hr


Lidocaine (Lidoderm Patch -)  1 patch TP DAILY [facility]


   Last Admin: 07/27/17 10:03 Dose:  1 patch


Losartan Potassium (Cozaar -)  25 mg PO DAILY [facility]


   Last Admin: 07/27/17 10:02 Dose:  25 mg


Metoprolol Tartrate (Lopressor -)  25 mg PO BID [facility]


   Last Admin: 07/27/17 21:14 Dose:  25 mg


Metoprolol Tartrate (Lopressor Injection -)  5 mg IVPB Q4H PRN


   PRN Reason: HYPERTENSION


Miscellaneous (Lidoderm Patch Removal)  1 each MC DAILY@2200 [facility]


   Last Admin: 07/27/17 21:15 Dose:  1 each


Miscellaneous (Duragesic Patch Waste)  1 each TD PRN PRN


   PRN Reason: PAIN


Esomeprazole (Nexium ) 40 Mg Tablet (Pt's Own)  1 each PO DAILY [facility]


   Last Admin: 07/27/17 10:03 Dose:  1 each


Ondansetron HCl (Zofran Injection)  4 mg IVPB Q6H PRN


   PRN Reason: NAUSEA AND/OR VOMITING


   Last Admin: 07/26/17 13:03 Dose:  4 mg


Petrolatum (Vaseline)  1 applic TP DAILY [facility]


   Last Admin: 07/27/17 10:04 Dose:  1 applic


Simethicone (Mylicon -)  80 mg PO Q4H PRN


   PRN Reason: GAS


   Last Admin: 07/26/17 10:18 Dose:  80 mg











- Objective


Vital Signs: 


 Vital Signs











Temperature  99.2 F   07/28/17 06:18


 


Pulse Rate  77   07/28/17 06:18


 


Respiratory Rate  20   07/28/17 06:18


 


Blood Pressure  145/80   07/28/17 06:18


 


O2 Sat by Pulse Oximetry (%)  98   07/27/17 21:00











Constitutional: Yes: Calm


HENT: Yes: Other (+ NGT)


Cardiovascular: Yes: Regular Rate and Rhythm


Respiratory: Yes: CTA Bilaterally (no rales or wheezing)


Gastrointestinal: Yes: Soft (decreased bowel sounds; no rebound or guarding 

tenderness)


Edema: No


Labs: 


 CBC, BMP





 07/27/17 06:00 





 07/27/17 06:00 











Assessment/Plan


IMP:


POD #7 s/p robotic sigmoid colectomy now with partial SBO requiring NGT


CAD s/p PCI RCA 2009 w/ recent cath showing non-obstructive CAD w/ patent 

intervention site


HTN





REC:


BP is controlled this morning and she reports tolerating pills yesterday.


There is a PRN IV metoprolol order in place with BP parameters as well.


Daily electrolytes.





If not tolerating pills, ASA can be switched to per rectum.

## 2017-07-29 RX ADMIN — METOPROLOL TARTRATE SCH MG: 25 TABLET, FILM COATED ORAL at 09:58

## 2017-07-29 RX ADMIN — Medication SCH EACH: at 10:01

## 2017-07-29 RX ADMIN — BENZOCAINE AND MENTHOL PRN EACH: 15; 3.6 LOZENGE ORAL at 04:09

## 2017-07-29 RX ADMIN — ASPIRIN SCH MG: 81 TABLET, COATED ORAL at 09:58

## 2017-07-29 RX ADMIN — POTASSIUM CHLORIDE, DEXTROSE MONOHYDRATE AND SODIUM CHLORIDE SCH MLS/HR: 150; 5; 450 INJECTION, SOLUTION INTRAVENOUS at 17:34

## 2017-07-29 RX ADMIN — ACETAMINOPHEN PRN MG: 10 INJECTION, SOLUTION INTRAVENOUS at 10:48

## 2017-07-29 RX ADMIN — Medication SCH EACH: at 22:40

## 2017-07-29 RX ADMIN — ACETAMINOPHEN PRN MG: 10 INJECTION, SOLUTION INTRAVENOUS at 03:25

## 2017-07-29 RX ADMIN — LOSARTAN POTASSIUM SCH MG: 25 TABLET, FILM COATED ORAL at 09:58

## 2017-07-29 RX ADMIN — WHITE PETROLATUM SCH APPLIC: 1 OINTMENT TOPICAL at 10:02

## 2017-07-29 RX ADMIN — HEPARIN SODIUM SCH UNIT: 5000 INJECTION, SOLUTION INTRAVENOUS; SUBCUTANEOUS at 09:57

## 2017-07-29 RX ADMIN — BENZOCAINE AND MENTHOL PRN EACH: 15; 3.6 LOZENGE ORAL at 18:24

## 2017-07-29 RX ADMIN — METOPROLOL TARTRATE SCH MG: 25 TABLET, FILM COATED ORAL at 22:40

## 2017-07-29 RX ADMIN — POTASSIUM CHLORIDE, DEXTROSE MONOHYDRATE AND SODIUM CHLORIDE SCH: 150; 5; 450 INJECTION, SOLUTION INTRAVENOUS at 09:48

## 2017-07-29 RX ADMIN — POTASSIUM CHLORIDE, DEXTROSE MONOHYDRATE AND SODIUM CHLORIDE SCH MLS/HR: 150; 5; 450 INJECTION, SOLUTION INTRAVENOUS at 05:22

## 2017-07-29 RX ADMIN — HEPARIN SODIUM SCH UNIT: 5000 INJECTION, SOLUTION INTRAVENOUS; SUBCUTANEOUS at 22:39

## 2017-07-29 RX ADMIN — LIDOCAINE SCH PATCH: 50 PATCH TOPICAL at 09:58

## 2017-07-29 RX ADMIN — ACETAMINOPHEN PRN MG: 10 INJECTION, SOLUTION INTRAVENOUS at 18:24

## 2017-07-29 NOTE — PN
Progress Note, Physician


Chief Complaint: 


Pt A&Ox3; sitting up in bed; no abdominal or chest pain.





- Current Medication List


Current Medications: 


Active Medications





Acetaminophen (Tylenol -)  1,000 mg PO Q6H PRN


   PRN Reason: FEVER OR PAIN


   Last Admin: 07/23/17 21:18 Dose:  1,000 mg


Acetaminophen (Ofirmev Injection -)  1,000 mg IVPB Q6H PRN


   Last Admin: 07/29/17 10:48 Dose:  1,000 mg


Albuterol Sulfate (Ventolin Hfa Inhaler -)  2 puff IH Q4H PRN


   PRN Reason: SHORT OF BREATH/WHEEZING


Aspirin (Ecotrin -)  81 mg PO DAILY Replaced by [facility]


   Last Admin: 07/29/17 09:58 Dose:  81 mg


Benzocaine/Menthol (Cepacol Lozenge -)  1 each MM Q6H PRN


   PRN Reason: SORE THROAT


   Last Admin: 07/29/17 04:09 Dose:  1 each


Bupropion HCl (Wellbutrin Xl -)  150 mg PO DAILY Replaced by [facility]


   Last Admin: 07/29/17 09:57 Dose:  150 mg


Fentanyl (Duragesic 25mcg Patch -)  1 patch TD Q72H Replaced by [facility]


   Stop: 08/03/17 14:00


   Last Admin: 07/27/17 14:33 Dose:  1 patch


Furosemide (Lasix -)  20 mg PO Q2D Replaced by [facility]


   Last Admin: 07/28/17 09:34 Dose:  20 mg


Heparin Sodium (Porcine) (Heparin -)  5,000 unit SQ BID Replaced by [facility]


   Last Admin: 07/29/17 09:57 Dose:  5,000 unit


Potassium Chloride/Dextrose/Sod Cl (D5-1/2ns+20 Meq Kcl -)  1,000 mls @ 83 mls/

hr IV ASDIR Replaced by [facility]


   Last Admin: 07/29/17 09:48 Dose:  Not Given


Lidocaine (Lidoderm Patch -)  1 patch TP DAILY Replaced by [facility]


   Last Admin: 07/29/17 09:58 Dose:  1 patch


Losartan Potassium (Cozaar -)  25 mg PO DAILY Replaced by [facility]


   Last Admin: 07/29/17 09:58 Dose:  25 mg


Metoprolol Tartrate (Lopressor -)  25 mg PO BID Replaced by [facility]


   Last Admin: 07/29/17 09:58 Dose:  25 mg


Metoprolol Tartrate (Lopressor Injection -)  5 mg IVPB Q4H PRN


   PRN Reason: HYPERTENSION


Miscellaneous (Lidoderm Patch Removal)  1 each MC DAILY@2200 Replaced by [facility]


   Last Admin: 07/28/17 21:22 Dose:  1 each


Miscellaneous (Duragesic Patch Waste)  1 each TD PRN PRN


   PRN Reason: PAIN


Esomeprazole (Nexium ) 40 Mg Tablet (Pt's Own)  1 each PO DAILY Replaced by [facility]


   Last Admin: 07/29/17 10:01 Dose:  1 each


Ondansetron HCl (Zofran Injection)  4 mg IVPB Q6H PRN


   PRN Reason: NAUSEA AND/OR VOMITING


   Last Admin: 07/26/17 13:03 Dose:  4 mg


Petrolatum (Vaseline)  1 applic TP DAILY Replaced by [facility]


   Last Admin: 07/29/17 10:02 Dose:  1 applic


Simethicone (Mylicon -)  80 mg PO Q4H PRN


   PRN Reason: GAS


   Last Admin: 07/28/17 09:34 Dose:  80 mg











- Objective


Vital Signs: 


 Vital Signs











Temperature  98.7 F   07/29/17 14:40


 


Pulse Rate  67   07/29/17 14:40


 


Respiratory Rate  18   07/29/17 14:40


 


Blood Pressure  160/77   07/29/17 14:40


 


O2 Sat by Pulse Oximetry (%)  98   07/28/17 21:00











Constitutional: Yes: Calm


Eyes: Yes: WNL


HENT: Yes: WNL


Neck: Yes: WNL


Cardiovascular: Yes: Regular Rate and Rhythm


Respiratory: Yes: Regular


Gastrointestinal: Yes: Soft


...Rectal Exam: Yes: Deferred


Genitourinary: No: Anuria


Musculoskeletal: Yes: Muscle Weakness


Extremities: Yes: Cool


Edema: No


Peripheral Pulses WNL: Yes


Integumentary: Yes: Incision


Neurological: Yes: Alert, Oriented, Weakness


Psychiatric: Yes: WNL


Labs: 


 CBC, BMP





 07/27/17 06:00 





 07/28/17 10:15 











- ....Imaging


X-ray: Image Reviewed (partial SBO)





Problem List





- Problems


(1) Status post laparoscopic-assisted sigmoidectomy


Assessment/Plan: 


stable.


F/u with surgeon.


Code(s): Z90.49 - ACQUIRED ABSENCE OF OTHER SPECIFIED PARTS OF DIGESTIVE TRACT





(2) Diverticulitis


Code(s): K57.92 - DVTRCLI OF INTEST, PART UNSP, W/O PERF OR ABSCESS W/O BLEED   

Qualifiers: 


     Diverticulitis site: large intestine     Diverticulitis bleeding: without 

bleeding     Diverticulitis complication: with perforation        Qualified Code

(s): K57.20 - Diverticulitis of large intestine with perforation and abscess 

without bleeding  





(3) HLD (hyperlipidemia)


Assessment/Plan: 


Continue present medications; f/u BP serially.


Code(s): E78.5 - HYPERLIPIDEMIA, UNSPECIFIED   





(4) HTN (hypertension)


Code(s): I10 - ESSENTIAL (PRIMARY) HYPERTENSION   





(5) Pneumoperitoneum


Code(s): K66.8 - OTHER SPECIFIED DISORDERS OF PERITONEUM





(6) Partial small bowel obstruction


Code(s): K56.69 - OTHER INTESTINAL OBSTRUCTION





(7) CAD (coronary artery disease)


Assessment/Plan: 


s/p RCA stent 2009; nonobstructive disease on recent angiogram.


Continue ASA.


F/u lipids.


Code(s): I25.10 - ATHSCL HEART DISEASE OF NATIVE CORONARY ARTERY W/O ANG PCTRS 

  





(8) Depression


Assessment/Plan: 


on bupropion.


Code(s): F32.9 - MAJOR DEPRESSIVE DISORDER, SINGLE EPISODE, UNSPECIFIED

## 2017-07-29 NOTE — PN
Progress Note, Physician





- Current Medication List


Current Medications: 


Active Medications





Acetaminophen (Tylenol -)  1,000 mg PO Q6H PRN


   PRN Reason: FEVER OR PAIN


   Last Admin: 07/23/17 21:18 Dose:  1,000 mg


Acetaminophen (Ofirmev Injection -)  1,000 mg IVPB Q6H PRN


   Last Admin: 07/29/17 10:48 Dose:  1,000 mg


Albuterol Sulfate (Ventolin Hfa Inhaler -)  2 puff IH Q4H PRN


   PRN Reason: SHORT OF BREATH/WHEEZING


Aspirin (Ecotrin -)  81 mg PO DAILY [facility]


   Last Admin: 07/29/17 09:58 Dose:  81 mg


Benzocaine/Menthol (Cepacol Lozenge -)  1 each MM Q6H PRN


   PRN Reason: SORE THROAT


   Last Admin: 07/29/17 04:09 Dose:  1 each


Bupropion HCl (Wellbutrin Xl -)  150 mg PO DAILY [facility]


   Last Admin: 07/29/17 09:57 Dose:  150 mg


Fentanyl (Duragesic 25mcg Patch -)  1 patch TD Q72H [facility]


   Stop: 08/03/17 14:00


   Last Admin: 07/27/17 14:33 Dose:  1 patch


Furosemide (Lasix -)  20 mg PO Q2D [facility]


   Last Admin: 07/28/17 09:34 Dose:  20 mg


Heparin Sodium (Porcine) (Heparin -)  5,000 unit SQ BID [facility]


   Last Admin: 07/29/17 09:57 Dose:  5,000 unit


Potassium Chloride/Dextrose/Sod Cl (D5-1/2ns+20 Meq Kcl -)  1,000 mls @ 83 mls/

hr IV ASDIR [facility]


   Last Admin: 07/29/17 09:48 Dose:  Not Given


Lidocaine (Lidoderm Patch -)  1 patch TP DAILY [facility]


   Last Admin: 07/29/17 09:58 Dose:  1 patch


Losartan Potassium (Cozaar -)  25 mg PO DAILY [facility]


   Last Admin: 07/29/17 09:58 Dose:  25 mg


Metoprolol Tartrate (Lopressor -)  25 mg PO BID [facility]


   Last Admin: 07/29/17 09:58 Dose:  25 mg


Metoprolol Tartrate (Lopressor Injection -)  5 mg IVPB Q4H PRN


   PRN Reason: HYPERTENSION


Miscellaneous (Lidoderm Patch Removal)  1 each MC DAILY@2200 [facility]


   Last Admin: 07/28/17 21:22 Dose:  1 each


Miscellaneous (Duragesic Patch Waste)  1 each TD PRN PRN


   PRN Reason: PAIN


Esomeprazole (Nexium ) 40 Mg Tablet (Pt's Own)  1 each PO DAILY [facility]


   Last Admin: 07/29/17 10:01 Dose:  1 each


Ondansetron HCl (Zofran Injection)  4 mg IVPB Q6H PRN


   PRN Reason: NAUSEA AND/OR VOMITING


   Last Admin: 07/26/17 13:03 Dose:  4 mg


Petrolatum (Vaseline)  1 applic TP DAILY [facility]


   Last Admin: 07/29/17 10:02 Dose:  1 applic


Simethicone (Mylicon -)  80 mg PO Q4H PRN


   PRN Reason: GAS


   Last Admin: 07/28/17 09:34 Dose:  80 mg











- Objective


Vital Signs: 


 Vital Signs











Temperature  98.7 F   07/29/17 14:40


 


Pulse Rate  67   07/29/17 14:40


 


Respiratory Rate  18   07/29/17 14:40


 


Blood Pressure  160/77   07/29/17 14:40


 


O2 Sat by Pulse Oximetry (%)  98   07/28/17 21:00











Labs: 


 CBC, BMP





 07/27/17 06:00 





 07/28/17 10:15 











Problem List





- Problems


(1) Diverticulitis


Code(s): K57.92 - DVTRCLI OF INTEST, PART UNSP, W/O PERF OR ABSCESS W/O BLEED   

Qualifiers: 


     Diverticulitis site: large intestine     Diverticulitis bleeding: without 

bleeding     Diverticulitis complication: with perforation        Qualified Code

(s): K57.20 - Diverticulitis of large intestine with perforation and abscess 

without bleeding  





(2) HTN (hypertension)


Code(s): I10 - ESSENTIAL (PRIMARY) HYPERTENSION   





(3) HLD (hyperlipidemia)


Code(s): E78.5 - HYPERLIPIDEMIA, UNSPECIFIED   





(4) Depression


Code(s): F32.9 - MAJOR DEPRESSIVE DISORDER, SINGLE EPISODE, UNSPECIFIED

## 2017-07-29 NOTE — PN
Progress Note (short form)





- Note


Progress Note: 


POD #10





NO BM BUT CONTINUES TO PASS FLATUS


FUA = RESOLVING PARTIAL SBO


AFEBRILE, VSS


ABD: SOFT, MILD LLQ TENDERNESS


NGT - BILIOUS DRAINAGE, 350/24 H


A/P: RESOLVING POST-OP PARTIAL SBO


MAY HAVE ICE CHIPS


CONTINUE NGT TO MEDIUM WALL SUCTION


R/U FUA IN AM 


OOB, IS, DVT, & GI PROPHYLAXIS

## 2017-07-30 LAB
ANION GAP SERPL CALC-SCNC: 10 MMOL/L (ref 8–16)
BASOPHILS # BLD: 0.7 % (ref 0–2)
CALCIUM SERPL-MCNC: 8.2 MG/DL (ref 8.5–10.1)
CO2 SERPL-SCNC: 27 MMOL/L (ref 21–32)
CREAT SERPL-MCNC: 0.8 MG/DL (ref 0.55–1.02)
DEPRECATED RDW RBC AUTO: 14.5 % (ref 11.6–15.6)
EOSINOPHIL # BLD: 3.3 % (ref 0–4.5)
GLUCOSE SERPL-MCNC: 77 MG/DL (ref 74–106)
MCH RBC QN AUTO: 28.8 PG (ref 25.7–33.7)
MCHC RBC AUTO-ENTMCNC: 33.3 G/DL (ref 32–36)
MCV RBC: 86.4 FL (ref 80–96)
NEUTROPHILS # BLD: 60.7 % (ref 42.8–82.8)
PLATELET # BLD AUTO: 286 K/MM3 (ref 134–434)
PMV BLD: 8.2 FL (ref 7.5–11.1)
WBC # BLD AUTO: 7.5 K/MM3 (ref 4–10)

## 2017-07-30 RX ADMIN — LOSARTAN POTASSIUM SCH MG: 25 TABLET, FILM COATED ORAL at 09:54

## 2017-07-30 RX ADMIN — SIMETHICONE CHEW TAB 80 MG PRN MG: 80 TABLET ORAL at 09:53

## 2017-07-30 RX ADMIN — ASPIRIN SCH MG: 81 TABLET, COATED ORAL at 09:53

## 2017-07-30 RX ADMIN — ACETAMINOPHEN PRN MG: 10 INJECTION, SOLUTION INTRAVENOUS at 22:44

## 2017-07-30 RX ADMIN — Medication SCH EACH: at 22:06

## 2017-07-30 RX ADMIN — ACETAMINOPHEN PRN MG: 500 TABLET, FILM COATED ORAL at 22:05

## 2017-07-30 RX ADMIN — BENZOCAINE AND MENTHOL PRN EACH: 15; 3.6 LOZENGE ORAL at 22:06

## 2017-07-30 RX ADMIN — HEPARIN SODIUM SCH UNIT: 5000 INJECTION, SOLUTION INTRAVENOUS; SUBCUTANEOUS at 22:06

## 2017-07-30 RX ADMIN — ACETAMINOPHEN PRN MG: 10 INJECTION, SOLUTION INTRAVENOUS at 11:10

## 2017-07-30 RX ADMIN — FENTANYL TRANSDERMAL SCH PATCH: 25 PATCH, EXTENDED RELEASE TRANSDERMAL at 14:32

## 2017-07-30 RX ADMIN — METOPROLOL TARTRATE SCH MG: 25 TABLET, FILM COATED ORAL at 22:06

## 2017-07-30 RX ADMIN — BENZOCAINE AND MENTHOL PRN EACH: 15; 3.6 LOZENGE ORAL at 10:12

## 2017-07-30 RX ADMIN — POTASSIUM CHLORIDE, DEXTROSE MONOHYDRATE AND SODIUM CHLORIDE SCH MLS/HR: 150; 5; 450 INJECTION, SOLUTION INTRAVENOUS at 22:05

## 2017-07-30 RX ADMIN — LIDOCAINE SCH PATCH: 50 PATCH TOPICAL at 09:53

## 2017-07-30 RX ADMIN — WHITE PETROLATUM SCH APPLIC: 1 OINTMENT TOPICAL at 09:55

## 2017-07-30 RX ADMIN — ACETAMINOPHEN PRN MG: 10 INJECTION, SOLUTION INTRAVENOUS at 01:10

## 2017-07-30 RX ADMIN — Medication SCH EACH: at 09:55

## 2017-07-30 RX ADMIN — POTASSIUM CHLORIDE, DEXTROSE MONOHYDRATE AND SODIUM CHLORIDE SCH MLS/HR: 150; 5; 450 INJECTION, SOLUTION INTRAVENOUS at 09:55

## 2017-07-30 RX ADMIN — HEPARIN SODIUM SCH UNIT: 5000 INJECTION, SOLUTION INTRAVENOUS; SUBCUTANEOUS at 09:54

## 2017-07-30 RX ADMIN — METOPROLOL TARTRATE SCH MG: 25 TABLET, FILM COATED ORAL at 09:54

## 2017-07-30 RX ADMIN — FUROSEMIDE SCH MG: 20 TABLET ORAL at 09:54

## 2017-07-30 NOTE — PN
Progress Note (short form)





- Note


Progress Note: 


POD #11





+Flatus, no BM


NGT clamped with 15 ml residuals


Afebrile, VSS


Abd: soft, minimal lower abdominal tenderness


WBC = 7.5k, K+ = 3.6, crea = 0.8


FUA = persistent SB DISTENTION consistent with partial SBO


A/P: POST-op partial SBO, with incomplete resolution


trial of water soluble contrast (Omnipaque) via NGT to relieve partial SBO


F/U FUA in am, if no improvement, will return to OR for laparoscopic CRISTINA - 

patient and family(son) informed of plan of management and both agree.

## 2017-07-31 LAB
ANION GAP SERPL CALC-SCNC: 10 MMOL/L (ref 8–16)
APTT BLD: 28.2 SECONDS (ref 26.9–34.4)
CALCIUM SERPL-MCNC: 8.6 MG/DL (ref 8.5–10.1)
CO2 SERPL-SCNC: 28 MMOL/L (ref 21–32)
CREAT SERPL-MCNC: 1.1 MG/DL (ref 0.55–1.02)
GLUCOSE SERPL-MCNC: 91 MG/DL (ref 74–106)
INR BLD: 1.06 (ref 0.82–1.09)
MAGNESIUM SERPL-MCNC: 2 MG/DL (ref 1.8–2.4)
PT PNL PPP: 11.7 SEC (ref 9.98–11.88)

## 2017-07-31 PROCEDURE — 0WJG4ZZ INSPECTION OF PERITONEAL CAVITY, PERCUTANEOUS ENDOSCOPIC APPROACH: ICD-10-PCS | Performed by: SURGERY

## 2017-07-31 PROCEDURE — 0DQ84ZZ REPAIR SMALL INTESTINE, PERCUTANEOUS ENDOSCOPIC APPROACH: ICD-10-PCS | Performed by: SURGERY

## 2017-07-31 PROCEDURE — 0WJP4ZZ INSPECTION OF GASTROINTESTINAL TRACT, PERCUTANEOUS ENDOSCOPIC APPROACH: ICD-10-PCS | Performed by: SURGERY

## 2017-07-31 PROCEDURE — 0DNB4ZZ RELEASE ILEUM, PERCUTANEOUS ENDOSCOPIC APPROACH: ICD-10-PCS | Performed by: SURGERY

## 2017-07-31 RX ADMIN — METOPROLOL TARTRATE SCH MG: 25 TABLET, FILM COATED ORAL at 23:01

## 2017-07-31 RX ADMIN — ACETAMINOPHEN PRN MG: 10 INJECTION, SOLUTION INTRAVENOUS at 06:29

## 2017-07-31 RX ADMIN — WHITE PETROLATUM SCH APPLIC: 1 OINTMENT TOPICAL at 09:40

## 2017-07-31 RX ADMIN — HEPARIN SODIUM SCH UNIT: 5000 INJECTION, SOLUTION INTRAVENOUS; SUBCUTANEOUS at 23:01

## 2017-07-31 RX ADMIN — ASPIRIN SCH: 81 TABLET, COATED ORAL at 09:41

## 2017-07-31 RX ADMIN — HEPARIN SODIUM SCH: 5000 INJECTION, SOLUTION INTRAVENOUS; SUBCUTANEOUS at 09:41

## 2017-07-31 RX ADMIN — ACETAMINOPHEN PRN MG: 10 INJECTION, SOLUTION INTRAVENOUS at 22:08

## 2017-07-31 RX ADMIN — LIDOCAINE SCH PATCH: 50 PATCH TOPICAL at 09:38

## 2017-07-31 RX ADMIN — Medication SCH EACH: at 09:40

## 2017-07-31 RX ADMIN — BENZOCAINE AND MENTHOL PRN EACH: 15; 3.6 LOZENGE ORAL at 06:32

## 2017-07-31 RX ADMIN — METOPROLOL TARTRATE SCH MG: 25 TABLET, FILM COATED ORAL at 09:39

## 2017-07-31 RX ADMIN — Medication SCH EACH: at 23:02

## 2017-07-31 RX ADMIN — LOSARTAN POTASSIUM SCH MG: 25 TABLET, FILM COATED ORAL at 09:39

## 2017-07-31 RX ADMIN — ACETAMINOPHEN PRN MG: 10 INJECTION, SOLUTION INTRAVENOUS at 14:37

## 2017-07-31 NOTE — OP
Operative Note





- Note:


Operative Date: 07/31/17


Pre-Operative Diagnosis: Post-op small bowel obstruction


Operation: Diagnostic laparoscopy, Lysis of adhesions


Findings: 


dense adhesion of loop of terminal ileum into pelvis resulting to kinking and 

partial obstruction


Post-Operative Diagnosis: Same as Pre-op


Surgeon: Chang Rowe


Assistant: Melodie Jimenez


Anesthesiologist/CRNA: Roberto Carlos Humphries


Anesthesia: General


Specimens Removed: none


Estimated Blood Loss (mls): 10


Operative Report Dictated: Yes

## 2017-07-31 NOTE — PN
Progress Note (short form)





- Note


Progress Note: 


POD #12





Patient had 2 BM'S after OMNIPAQUE administration,however, NGT drainage 

increased to 1900 cc


FUA = PERSISTENT PARTIAL SBO


AFEBRILE, VSS


abd: mildly distended,soft


A/P: persistent post-op partial SBO


FOR diagnostic laparoscopy, possible CRISTINA, bowel resection, ostomy, &/or 

laparotomy


for incomplete resolution of post-op SBO

## 2017-07-31 NOTE — PN
Progress Note, Physician


History of Present Illness: 


seen and examined today in nad. no new complaints. 








- Current Medication List


Current Medications: 


Active Medications





Acetaminophen (Tylenol -)  1,000 mg PO Q6H PRN


   PRN Reason: FEVER OR PAIN


   Last Admin: 07/23/17 21:18 Dose:  1,000 mg


Acetaminophen (Ofirmev Injection -)  1,000 mg IVPB Q6H PRN


   Last Admin: 07/31/17 06:29 Dose:  1,000 mg


Albuterol Sulfate (Ventolin Hfa Inhaler -)  2 puff IH Q4H PRN


   PRN Reason: SHORT OF BREATH/WHEEZING


Aspirin (Ecotrin -)  81 mg PO DAILY [facility]


   Last Admin: 07/31/17 09:41 Dose:  Not Given


Benzocaine/Menthol (Cepacol Lozenge -)  1 each MM Q6H PRN


   PRN Reason: SORE THROAT


   Last Admin: 07/31/17 06:32 Dose:  1 each


Bupropion HCl (Wellbutrin Xl -)  150 mg PO DAILY [facility]


   Last Admin: 07/31/17 09:50 Dose:  150 mg


Fentanyl (Duragesic 25mcg Patch -)  1 patch TD Q72H [facility]


   Stop: 08/03/17 14:00


   Last Admin: 07/30/17 14:32 Dose:  1 patch


Furosemide (Lasix -)  20 mg PO Q2D [facility]


   Last Admin: 07/30/17 09:54 Dose:  20 mg


Heparin Sodium (Porcine) (Heparin -)  5,000 unit SQ BID [facility]


   Last Admin: 07/31/17 09:41 Dose:  Not Given


Lactated Ringer's (Lactated Ringers Solution)  1,000 mls @ 100 mls/hr IV ASDIR 

[facility]


   Last Admin: 07/31/17 09:53 Dose:  100 mls/hr


Lidocaine (Lidoderm Patch -)  1 patch TP DAILY [facility]


   Last Admin: 07/31/17 09:38 Dose:  1 patch


Losartan Potassium (Cozaar -)  25 mg PO DAILY [facility]


   Last Admin: 07/31/17 09:39 Dose:  25 mg


Metoprolol Tartrate (Lopressor -)  25 mg PO BID [facility]


   Last Admin: 07/31/17 09:39 Dose:  25 mg


Metoprolol Tartrate (Lopressor Injection -)  5 mg IVPB Q4H PRN


   PRN Reason: HYPERTENSION


Miscellaneous (Lidoderm Patch Removal)  1 each MC DAILY@2200 [facility]


   Last Admin: 07/30/17 22:06 Dose:  1 each


Miscellaneous (Duragesic Patch Waste)  1 each TD PRN PRN


   PRN Reason: PAIN


   Last Admin: 07/30/17 13:33 Dose:  1 each


Esomeprazole (Nexium ) 40 Mg Tablet (Pt's Own)  1 each PO DAILY [facility]


   Last Admin: 07/31/17 09:40 Dose:  1 each


Ondansetron HCl (Zofran Injection)  4 mg IVPB Q6H PRN


   PRN Reason: NAUSEA AND/OR VOMITING


   Last Admin: 07/26/17 13:03 Dose:  4 mg


Petrolatum (Vaseline)  1 applic TP DAILY [facility]


   Last Admin: 07/31/17 09:40 Dose:  1 applic


Simethicone (Mylicon -)  80 mg PO Q4H PRN


   PRN Reason: GAS


   Last Admin: 07/30/17 09:53 Dose:  80 mg











- Objective


Vital Signs: 


 Vital Signs











Temperature  97.9 F   07/31/17 08:50


 


Pulse Rate  73   07/31/17 08:50


 


Respiratory Rate  19   07/31/17 08:50


 


Blood Pressure  122/69   07/31/17 08:50


 


O2 Sat by Pulse Oximetry (%)  95   07/30/17 21:00











Constitutional: Yes: No Distress, Calm


Eyes: Yes: Conjunctiva Clear, EOM Intact, PERRL


HENT: Yes: Atraumatic, Normocephalic


Neck: Yes: Supple, Trachea Midline


Cardiovascular: Yes: Regular Rate and Rhythm, S1, S2.  No: Bradycardia, 

Tachycardia, Pulse Irregular, Bruit, JVD, Gallop, Murmur, Rub, S3, S4, 

Varicosities


Respiratory: Yes: Regular, CTA Bilaterally.  No: Rales, Rhonchi, Wheezes


Gastrointestinal: Yes: Soft, Tenderness


Edema: No


Peripheral Pulses WNL: Yes


Neurological: Yes: Alert, Oriented


Psychiatric: Yes: Alert, Oriented


Labs: 


 CBC, BMP





 07/30/17 06:20 





 07/31/17 07:00 











- ....Imaging


Chest X-ray: Report Reviewed, Image Reviewed


EKG: Report Reviewed, Image Reviewed


Other: Report Reviewed, Image Reviewed





Assessment/Plan


IMP:


s/p robotic sigmoid colectomy with post op partial SBO


CAD s/p PCI RCA 2009 w/ recent cath showing non-obstructive CAD w/ patent 

intervention site


HTN





REC:


May need to return to the OR for CRISTINA


HTN overall adequately controlled, at times elevated likely due to fluid shifts 

and discomfort


Cont Losartan, Metoprolol, and Lasix at current doses


Cont ASA if safe from a surgical standpoint

## 2017-08-01 LAB
ALBUMIN SERPL-MCNC: 2.7 G/DL (ref 3.4–5)
ALP SERPL-CCNC: 77 U/L (ref 45–117)
ALT SERPL-CCNC: 23 U/L (ref 12–78)
ANION GAP SERPL CALC-SCNC: 10 MMOL/L (ref 8–16)
AST SERPL-CCNC: 20 U/L (ref 15–37)
BASOPHILS # BLD: 0.1 % (ref 0–2)
BILIRUB SERPL-MCNC: 0.4 MG/DL (ref 0.2–1)
CALCIUM SERPL-MCNC: 8.2 MG/DL (ref 8.5–10.1)
CO2 SERPL-SCNC: 25 MMOL/L (ref 21–32)
CREAT SERPL-MCNC: 0.9 MG/DL (ref 0.55–1.02)
DEPRECATED RDW RBC AUTO: 14.7 % (ref 11.6–15.6)
EOSINOPHIL # BLD: 0 % (ref 0–4.5)
GLUCOSE SERPL-MCNC: 83 MG/DL (ref 74–106)
MCH RBC QN AUTO: 28.7 PG (ref 25.7–33.7)
MCHC RBC AUTO-ENTMCNC: 33.4 G/DL (ref 32–36)
MCV RBC: 86 FL (ref 80–96)
NEUTROPHILS # BLD: 91.3 % (ref 42.8–82.8)
PLATELET # BLD AUTO: 272 K/MM3 (ref 134–434)
PMV BLD: 8.2 FL (ref 7.5–11.1)
PROT SERPL-MCNC: 5.7 G/DL (ref 6.4–8.2)
WBC # BLD AUTO: 12.8 K/MM3 (ref 4–10)

## 2017-08-01 RX ADMIN — METOPROLOL TARTRATE SCH MG: 25 TABLET, FILM COATED ORAL at 22:25

## 2017-08-01 RX ADMIN — LIDOCAINE SCH PATCH: 50 PATCH TOPICAL at 11:03

## 2017-08-01 RX ADMIN — ACETAMINOPHEN PRN MG: 10 INJECTION, SOLUTION INTRAVENOUS at 05:16

## 2017-08-01 RX ADMIN — LOSARTAN POTASSIUM SCH MG: 25 TABLET, FILM COATED ORAL at 11:04

## 2017-08-01 RX ADMIN — WHITE PETROLATUM SCH EA: 1 OINTMENT TOPICAL at 11:04

## 2017-08-01 RX ADMIN — BENZOCAINE AND MENTHOL PRN EACH: 15; 3.6 LOZENGE ORAL at 00:02

## 2017-08-01 RX ADMIN — ACETAMINOPHEN PRN MG: 10 INJECTION, SOLUTION INTRAVENOUS at 18:53

## 2017-08-01 RX ADMIN — Medication SCH EACH: at 22:25

## 2017-08-01 RX ADMIN — HEPARIN SODIUM SCH UNIT: 5000 INJECTION, SOLUTION INTRAVENOUS; SUBCUTANEOUS at 22:25

## 2017-08-01 RX ADMIN — LEUCINE, PHENYLALANINE, LYSINE, METHIONINE, ISOLEUCINE, VALINE, HISTIDINE, THREONINE, TRYPTOPHAN, ALANINE, GLYCINE, ARGININE, PROLINE, SERINE, TYROSINE, DEXTROSE SCH MLS/HR: 311; 238; 247; 170; 255; 247; 204; 179; 77; 880; 438; 489; 289; 213; 17; 5 INJECTION INTRAVENOUS at 07:48

## 2017-08-01 RX ADMIN — BENZOCAINE AND MENTHOL PRN EACH: 15; 3.6 LOZENGE ORAL at 19:08

## 2017-08-01 RX ADMIN — METOPROLOL TARTRATE SCH MG: 25 TABLET, FILM COATED ORAL at 11:04

## 2017-08-01 RX ADMIN — ACETAMINOPHEN PRN MG: 10 INJECTION, SOLUTION INTRAVENOUS at 12:16

## 2017-08-01 RX ADMIN — HEPARIN SODIUM SCH UNIT: 5000 INJECTION, SOLUTION INTRAVENOUS; SUBCUTANEOUS at 11:03

## 2017-08-01 NOTE — PN
Progress Note, Physician


Chief Complaint: 


returned to OR for lysis of adhesions





- Current Medication List


Current Medications: 


Active Medications





Acetaminophen (Ofirmev Injection -)  1,000 mg IVPB Q6H PRN


   Last Admin: 08/01/17 05:16 Dose:  1,000 mg


Albuterol Sulfate (Ventolin Hfa Inhaler -)  2 puff IH Q4H PRN


   PRN Reason: SHORT OF BREATH/WHEEZING


Benzocaine/Menthol (Cepacol Lozenge -)  1 each MM Q6H PRN


   PRN Reason: SORE THROAT


   Last Admin: 08/01/17 00:02 Dose:  1 each


Fentanyl (Duragesic 25mcg Patch -)  1 patch TD Q72H [facility]


Heparin Sodium (Porcine) (Heparin -)  5,000 unit SQ BID [facility]


   Last Admin: 07/31/17 23:01 Dose:  5,000 unit


Amino Acids (Clinimix -)  1,000 mls @ 84 mls/hr IV Q12H [facility]


   Last Admin: 08/01/17 07:48 Dose:  84 mls/hr


Lactated Ringer's (Lactated Ringers Solution)  1,000 mls @ 125 mls/hr IV ASDIR 

[facility]


   Last Admin: 07/31/17 22:08 Dose:  0 mls


Lidocaine (Lidoderm Patch -)  1 patch TP DAILY [facility]


Losartan Potassium (Cozaar -)  25 mg PO DAILY [facility]


Metoprolol Tartrate (Lopressor -)  25 mg PO BID [facility]


   Last Admin: 07/31/17 23:01 Dose:  25 mg


Metoprolol Tartrate (Lopressor Injection -)  5 mg IVPB Q4H PRN


   PRN Reason: HYPERTENSION


Miscellaneous (Duragesic Patch Waste)  1 each TD PRN PRN


   PRN Reason: PAIN


Miscellaneous (Lidoderm Patch Removal)  1 each MC DAILY@2200 [facility]


   Last Admin: 07/31/17 23:02 Dose:  1 each


Ondansetron HCl (Zofran Injection)  4 mg IVPB Q6H PRN


   PRN Reason: NAUSEA AND/OR VOMITING


Petrolatum (Vaseline)  1 applic TP DAILY [facility]











- Objective


Vital Signs: 


 Vital Signs











Temperature  97.4 F L  08/01/17 08:08


 


Pulse Rate  87   08/01/17 08:08


 


Respiratory Rate  20   08/01/17 08:08


 


Blood Pressure  177/77   08/01/17 08:08


 


O2 Sat by Pulse Oximetry (%)  99   07/31/17 22:15











Constitutional: Yes: Calm


Cardiovascular: Yes: Regular Rate and Rhythm


Respiratory: Yes: CTA Bilaterally


Gastrointestinal: Yes: Soft (no rebound or guarding)


Edema: No


Neurological: Yes: Alert, Oriented


Labs: 


 CBC, BMP





 08/01/17 07:30 





 INR, PTT











INR  1.06  (0.82-1.09)   07/31/17  12:56    








 Laboratory Tests











  08/01/17 08/01/17





  07:30 07:30


 


WBC  12.8 H D 


 


Hgb  10.7 


 


Plt Count  272 


 


Sodium   Pending


 


Potassium   Pending


 


Creatinine   Pending














Assessment/Plan


IMP:


s/p robotic sigmoid colectomy with post op partial SBO returned to OR 7/31 for 

lysis of adhesions


CAD s/p PCI RCA 2009 w/ recent cath showing non-obstructive CAD w/ patent 

intervention site


HTN








REC:


Can use IV lopressor PRN with parameters if not tolerating PO meds


Resume ASA when feasible


DVT prophylaxis (on SQ heparin)

## 2017-08-01 NOTE — OP
DATE OF OPERATION:  07/31/2017

 

PROCEDURE:  Diagnostic laparoscopy, lysis of adhesions, and enterorrhaphy.

 

PREOPERATIVE DIAGNOSIS:  Postoperative persistent partial small bowel 
obstruction. 

 

POSTOPERATIVE DIAGNOSIS:  Postoperative persistent partial small bowel 
obstruction.

 

SURGEON:  Chang Rowe MD

 

FIRST ASSISTANT:  ELEN Prater

 

ANESTHESIA:  General endotracheal.

 

FINDINGS AND PROCEDURE:  This is a 61-year-old female who underwent a

robotic-assisted laparoscopic sigmoid resection for chronic recurrent 
diverticulitis on

July 19, 2017. Subsequently, patient developed persistent partial small bowel 
obstruction as

evidenced by CT scan and serial abdominal x-rays.  So, patient was eventually 
advised

to return to the operating room for diagnostic laparoscopy to relieve the bowel

obstruction.  Consent was obtained after discussing the risks, benefits, and

alternatives of the procedure.  

 

Patient was brought to the operating room and placed in supine position.  
General

endotracheal anesthesia was administered.  The Sosa catheter was inserted, and 
both

arms were tucked to the side.  The abdomen was prepped and draped in the usual

sterile fashion.  Using 0.5% Marcaine, local anesthesia was administered to the

proposed incision sites.  

 

The peritoneal cavity was entered via the Optiview technique through a 5-mm 
incision

of the left subcostal region, and using a 5-mm 0-degree scope inserted in 5-mm

optical port.  Pneumoperitoneum was established.  Two 5-mm ports were inserted 
at the

left side at the left flank, one at the level of the umbilicus and one at the 
left

lower quadrant. Another 11 mm port was inserteda at the previous RUQ port site. 

The scope was then changed to a 5-mm 30-degree scope.  An omental

adhesion to the posterior abdominal wall at the Pfannenstiel incision was taken 
down

using the Ligasure vessel sealing device.  

 

The small bowel was noted to be mildly dilated down towards the pelvis.  Dense

adhesions of the pelvis were noted, and these were carefully released bluntly 
using

the bowel grasper and the suction .  The uterus was retracted 
anteriorly to

further visualize the pelvis.  At this point, the loop of the terminal ileum 
about

1.5 feet from the ileocecal valve was noted to be firmly adherent to the pelvis 
with

an inflammatory adhesion.  Careful attempts to take down the adhesion were done

with the use of the suction irrigation instrument and gauze dressing.  However, 
the

maneuver proved to be unsuccessful.  The small bowel was then traced from the

ileocecal junction towards the pelvis, and to confirm that the loop

of bowel was adherent to the pelvis with the terminal ileum.  Further tracing 
of the

small bowel was done from the proximal ileum towards the ligament of

Treitz, and no significant adhesion was noted.  

 

Attention was again focused on the pelvis, where this time, an inflammatory 
band was

taken down with laparoscopic angelica.  This then released the loop of intestine 
from

the pelvis with significant distention of the mildly collapsed terminal ileum.  
The

serosal tear was repaired with one figure-of-8 Vicryl 3-0 suture 
intracorporeally. 

On further inspection of the pelvis, these inflammatory adhesions were noted to 
be

around the anastomotic site.  The anastomosis was noted to be covered with 
pericolic

fat with no sign of any leak.  This raw surface of the pelvis was copiously 
irrigated

with sterile normal saline until the return was clear.  An attempt to bring 
down the

omentum proved to be unsuccessful owing to the inadequate length of the 
omentum.  

 

It was then decided to commence the procedure. The 11-mm port at the right 
upper quadrant

was removed.  This port site was then closed with one figure-of-8 Vicryl 0 
suture 

using the Tigre-Bill abdominal closure device.  The pneumoperitoneum 

was evacuated, and all the ports were removed.  The wounds were

closed with subcuticular Biosyn 4-0 sutures reinforced with Dermabond.  The 
Sosa was

removed, and the patient was successfully extubated.  The patient was 
transferred to

the post-anesthesia care unit in satisfactory condition.  

 

ESTIMATED BLOOD LOSS:  About 10 mL.

 

WOUND CLASS:  Clean-contaminated.

 

The patient received 750 mg of Levaquin and 600 mg of clindamycin as antibiotic

prophylaxis.  

 

 

GLORIA [id number]

DD: 07/31/2017 20:54

DT: 08/01/2017 09:55

Job #:  [id number]

MTDD

## 2017-08-01 NOTE — PN
Progress Note (short form)





- Note


Progress Note: 


s/p laparoscopic lysis of adhesions for persistent post-op partial SBO


No flatus or BM


Afebrile, VSS


Abd: mildly distended,  CC with ice chips


WBC = 12.8 K


A/P:stable immediate post-op period


start PPN, continue ngt until return of bowel function


OOB, IS, GI, & DVT prophylaxis

## 2017-08-01 NOTE — PN
Progress Note (short form)





- Note


Progress Note: 


ANESTHESIOLOGY POST-OP CHECK





61F s/p diagnostic laparoscopy and CRISTINA under general anesthesia, POD #1. No 

acute complaints, pain 7-8/10 and tolerable. Denies N/V, NGT in place, +void.





 Vital Signs











Temperature  97.4 F L  08/01/17 08:08


 


Pulse Rate  85   08/01/17 11:14


 


Respiratory Rate  20   08/01/17 08:08


 


Blood Pressure  157/86   08/01/17 11:14


 


O2 Sat by Pulse Oximetry (%)  99   07/31/17 22:15








Active Medications





Acetaminophen (Ofirmev Injection -)  1,000 mg IVPB Q6H PRN


   Last Admin: 08/01/17 12:16 Dose:  1,000 mg


Albuterol Sulfate (Ventolin Hfa Inhaler -)  2 puff IH Q4H PRN


   PRN Reason: SHORT OF BREATH/WHEEZING


Benzocaine/Menthol (Cepacol Lozenge -)  1 each MM Q6H PRN


   PRN Reason: SORE THROAT


   Last Admin: 08/01/17 00:02 Dose:  1 each


Fentanyl (Duragesic 25mcg Patch -)  1 patch TD Q72H [facility]


Heparin Sodium (Porcine) (Heparin -)  5,000 unit SQ BID [facility]


   Last Admin: 08/01/17 11:03 Dose:  5,000 unit


Amino Acids (Clinimix -)  1,000 mls @ 84 mls/hr IV Q12H [facility]


   Last Admin: 08/01/17 07:48 Dose:  84 mls/hr


Lactated Ringer's (Lactated Ringers Solution)  1,000 mls @ 125 mls/hr IV ASDIR 

[facility]


   Last Admin: 07/31/17 22:08 Dose:  0 mls


Lidocaine (Lidoderm Patch -)  1 patch TP DAILY [facility]


   Last Admin: 08/01/17 11:03 Dose:  1 patch


Losartan Potassium (Cozaar -)  25 mg PO DAILY [facility]


   Last Admin: 08/01/17 11:04 Dose:  25 mg


Metoprolol Tartrate (Lopressor -)  25 mg PO BID [facility]


   Last Admin: 08/01/17 11:04 Dose:  25 mg


Metoprolol Tartrate (Lopressor Injection -)  5 mg IVPB Q4H PRN


   PRN Reason: HYPERTENSION


Miscellaneous (Duragesic Patch Waste)  1 each TD PRN PRN


   PRN Reason: PAIN


Miscellaneous (Lidoderm Patch Removal)  1 each MC DAILY@2200 [facility]


   Last Admin: 07/31/17 23:02 Dose:  1 each


Ondansetron HCl (Zofran Injection)  4 mg IVPB Q6H PRN


   PRN Reason: NAUSEA AND/OR VOMITING


Petrolatum (Vaseline)  1 applic TP DAILY [facility]


   Last Admin: 08/01/17 11:04 Dose:  1 ea








Gen: Awake, alert





No apparent anesthesia complications, pain controlled. Continue management as 

per primary team.

## 2017-08-02 LAB
ANION GAP SERPL CALC-SCNC: 7 MMOL/L (ref 8–16)
BASOPHILS # BLD: 0.4 % (ref 0–2)
CALCIUM SERPL-MCNC: 8.7 MG/DL (ref 8.5–10.1)
CO2 SERPL-SCNC: 29 MMOL/L (ref 21–32)
CREAT SERPL-MCNC: 0.8 MG/DL (ref 0.55–1.02)
DEPRECATED RDW RBC AUTO: 14.8 % (ref 11.6–15.6)
EOSINOPHIL # BLD: 1.2 % (ref 0–4.5)
GLUCOSE SERPL-MCNC: 72 MG/DL (ref 74–106)
MCH RBC QN AUTO: 28.9 PG (ref 25.7–33.7)
MCHC RBC AUTO-ENTMCNC: 33.3 G/DL (ref 32–36)
MCV RBC: 86.8 FL (ref 80–96)
NEUTROPHILS # BLD: 60.4 % (ref 42.8–82.8)
PLATELET # BLD AUTO: 296 K/MM3 (ref 134–434)
PMV BLD: 8 FL (ref 7.5–11.1)
WBC # BLD AUTO: 7.9 K/MM3 (ref 4–10)

## 2017-08-02 RX ADMIN — LEUCINE, PHENYLALANINE, LYSINE, METHIONINE, ISOLEUCINE, VALINE, HISTIDINE, THREONINE, TRYPTOPHAN, ALANINE, GLYCINE, ARGININE, PROLINE, SERINE, TYROSINE, DEXTROSE SCH MLS/HR: 311; 238; 247; 170; 255; 247; 204; 179; 77; 880; 438; 489; 289; 213; 17; 5 INJECTION INTRAVENOUS at 01:46

## 2017-08-02 RX ADMIN — LOSARTAN POTASSIUM SCH MG: 25 TABLET, FILM COATED ORAL at 10:48

## 2017-08-02 RX ADMIN — Medication SCH EACH: at 22:25

## 2017-08-02 RX ADMIN — LEUCINE, PHENYLALANINE, LYSINE, METHIONINE, ISOLEUCINE, VALINE, HISTIDINE, THREONINE, TRYPTOPHAN, ALANINE, GLYCINE, ARGININE, PROLINE, SERINE, TYROSINE, DEXTROSE SCH MLS/HR: 311; 238; 247; 170; 255; 247; 204; 179; 77; 880; 438; 489; 289; 213; 17; 5 INJECTION INTRAVENOUS at 22:24

## 2017-08-02 RX ADMIN — METOPROLOL TARTRATE SCH MG: 25 TABLET, FILM COATED ORAL at 22:25

## 2017-08-02 RX ADMIN — ACETAMINOPHEN PRN MG: 10 INJECTION, SOLUTION INTRAVENOUS at 12:45

## 2017-08-02 RX ADMIN — ACETAMINOPHEN PRN MG: 10 INJECTION, SOLUTION INTRAVENOUS at 18:36

## 2017-08-02 RX ADMIN — LIDOCAINE SCH PATCH: 50 PATCH TOPICAL at 10:48

## 2017-08-02 RX ADMIN — WHITE PETROLATUM SCH EA: 1 OINTMENT TOPICAL at 10:48

## 2017-08-02 RX ADMIN — I.V. FAT EMULSION SCH MLS/HR: 20 EMULSION INTRAVENOUS at 22:24

## 2017-08-02 RX ADMIN — ACETAMINOPHEN PRN MG: 10 INJECTION, SOLUTION INTRAVENOUS at 01:47

## 2017-08-02 RX ADMIN — HEPARIN SODIUM SCH UNIT: 5000 INJECTION, SOLUTION INTRAVENOUS; SUBCUTANEOUS at 22:24

## 2017-08-02 RX ADMIN — Medication SCH EACH: at 17:22

## 2017-08-02 RX ADMIN — BENZOCAINE AND MENTHOL PRN EACH: 15; 3.6 LOZENGE ORAL at 18:43

## 2017-08-02 RX ADMIN — HEPARIN SODIUM SCH UNIT: 5000 INJECTION, SOLUTION INTRAVENOUS; SUBCUTANEOUS at 10:48

## 2017-08-02 RX ADMIN — BENZOCAINE AND MENTHOL PRN EACH: 15; 3.6 LOZENGE ORAL at 02:38

## 2017-08-02 RX ADMIN — LEUCINE, PHENYLALANINE, LYSINE, METHIONINE, ISOLEUCINE, VALINE, HISTIDINE, THREONINE, TRYPTOPHAN, ALANINE, GLYCINE, ARGININE, PROLINE, SERINE, TYROSINE, DEXTROSE SCH MLS/HR: 311; 238; 247; 170; 255; 247; 204; 179; 77; 880; 438; 489; 289; 213; 17; 5 INJECTION INTRAVENOUS at 07:06

## 2017-08-02 RX ADMIN — METOPROLOL TARTRATE SCH MG: 25 TABLET, FILM COATED ORAL at 10:48

## 2017-08-02 RX ADMIN — ACETAMINOPHEN PRN MG: 10 INJECTION, SOLUTION INTRAVENOUS at 07:06

## 2017-08-02 NOTE — PN
Progress Note, Physician


History of Present Illness: 


Pt still w/ NGT





- Current Medication List


Current Medications: 


Active Medications





Acetaminophen (Ofirmev Injection -)  1,000 mg IVPB Q6H PRN


   Last Admin: 08/02/17 18:36 Dose:  1,000 mg


Albuterol Sulfate (Ventolin Hfa Inhaler -)  2 puff IH Q4H PRN


   PRN Reason: SHORT OF BREATH/WHEEZING


Benzocaine/Menthol (Cepacol Lozenge -)  1 each MM Q6H PRN


   PRN Reason: SORE THROAT


   Last Admin: 08/02/17 18:43 Dose:  1 each


Bupropion HCl (Wellbutrin Xl -)  150 mg PO DAILY [facility]


   Last Admin: 08/02/17 10:49 Dose:  150 mg


Fentanyl (Duragesic 25mcg Patch -)  1 patch TD Q72H [facility]


   Last Admin: 08/02/17 14:37 Dose:  1 patch


Heparin Sodium (Porcine) (Heparin -)  5,000 unit SQ BID [facility]


   Last Admin: 08/02/17 10:48 Dose:  5,000 unit


Amino Acids (Clinimix -)  1,000 mls @ 84 mls/hr IV Q12H [facility]


   Last Admin: 08/02/17 07:06 Dose:  84 mls/hr


Fat Emulsion Intravenous (Intralipid -)  250 mls @ 20.833 mls/hr IV DAILY@2200 

[facility]


Lidocaine (Lidoderm Patch -)  1 patch TP DAILY [facility]


   Last Admin: 08/02/17 10:48 Dose:  1 patch


Losartan Potassium (Cozaar -)  25 mg PO DAILY [facility]


   Last Admin: 08/02/17 10:48 Dose:  25 mg


Metoprolol Tartrate (Lopressor -)  25 mg PO BID [facility]


   Last Admin: 08/02/17 10:48 Dose:  25 mg


Metoprolol Tartrate (Lopressor Injection -)  5 mg IVPB Q4H PRN


   PRN Reason: HYPERTENSION


Miscellaneous (Duragesic Patch Waste)  1 each TD PRN PRN


   PRN Reason: PAIN


   Last Admin: 08/02/17 14:36 Dose:  1 each


Miscellaneous (Lidoderm Patch Removal)  1 each MC DAILY@2200 [facility]


   Last Admin: 08/01/17 22:25 Dose:  1 each


Multivitamins/Minerals/Vitamin C (Tab-A-Vit -)  1 tab PO DAILY [facility]


Pt's Own (Non- Formulary)-Nexium 40mg  1 each PO DAILY [facility]


   Last Admin: 08/02/17 17:22 Dose:  1 each


Ondansetron HCl (Zofran Injection)  4 mg IVPB Q6H PRN


   PRN Reason: NAUSEA AND/OR VOMITING


Petrolatum (Vaseline)  1 applic TP DAILY [facility]


   Last Admin: 08/02/17 10:48 Dose:  1 ea











- Objective


Vital Signs: 


 Vital Signs











Temperature  98.1 F   08/02/17 16:30


 


Pulse Rate  64   08/02/17 16:30


 


Respiratory Rate  20   08/02/17 16:30


 


Blood Pressure  159/72   08/02/17 16:30


 


O2 Sat by Pulse Oximetry (%)  97   08/02/17 09:00











HENT: Yes: Other ((+) NGT)


Cardiovascular: Yes: WNL, Regular Rate and Rhythm


Respiratory: Yes: WNL, Regular, CTA Bilaterally


Gastrointestinal: Yes: Other ((+) incisional tenderness)


Labs: 


 CBC, BMP





 08/02/17 06:30 





 08/02/17 06:30 





 INR, PTT











INR  1.06  (0.82-1.09)   07/31/17  12:56    














Problem List





- Problems


(1) Diverticulitis


Assessment/Plan: 


S/P robotic lap colectomy w/ cystoscopy and uretheral stent placement


S/P lysis of adhesions


Cont NGT as per surgery


Long d/w pt about need to dc fentanyl


Code(s): K57.92 - DVTRCLI OF INTEST, PART UNSP, W/O PERF OR ABSCESS W/O BLEED   

Qualifiers: 


     Diverticulitis site: large intestine     Diverticulitis bleeding: without 

bleeding     Diverticulitis complication: with perforation        Qualified Code

(s): K57.20 - Diverticulitis of large intestine with perforation and abscess 

without bleeding  





(2) HTN (hypertension)


Code(s): I10 - ESSENTIAL (PRIMARY) HYPERTENSION   





(3) HLD (hyperlipidemia)


Code(s): E78.5 - HYPERLIPIDEMIA, UNSPECIFIED   





(4) Depression


Code(s): F32.9 - MAJOR DEPRESSIVE DISORDER, SINGLE EPISODE, UNSPECIFIED

## 2017-08-02 NOTE — PN
Progress Note, Physician


Chief Complaint: 


no chest pain or SOB





- Current Medication List


Current Medications: 


Active Medications





Acetaminophen (Ofirmev Injection -)  1,000 mg IVPB Q6H [facility]


   Last Admin: 08/02/17 07:06 Dose:  1,000 mg


Albuterol Sulfate (Ventolin Hfa Inhaler -)  2 puff IH Q4H [facility]


   PRN Reason: SHORT OF BREATH/WHEEZING


Benzocaine/Menthol (Cepacol Lozenge -)  1 each MM Q6H PRN


   PRN Reason: SORE THROAT


   Last Admin: 08/02/17 02:38 Dose:  1 each


Bupropion HCl (Wellbutrin Xl -)  150 [facility] PO [facility]ILY [facility]


Fentanyl (Duragesic 25mcg Patch -)  1 patch TD Q72H [facility]


Heparin Sodium (Porcine) (Heparin -)  5,000 unit SQ BID [facility]


   Last Admin: 08/01/17 22:25 Dose:  5,000 unit


Amino Acids (Clinimix -)  1,000 mls @ 84 mls/hr IV Q12H [facility]


   Last Admin: 08/02/17 07:06 Dose:  84 mls/hr


Lidocaine (Lidoderm Patch -)  1 patch TP DAILY [facility]


   Last Admin: 08/01/17 11:03 Dose:  1 patch


Losartan Potassium (Cozaar -)  25 mg PO DAILY [facility]


   Last Admin: 08/01/17 11:04 Dose:  25 mg


Metoprolol Tartrate (Lopressor -)  25 mg PO BID [facility]


   Last Admin: 08/01/17 22:25 Dose:  25 mg


Metoprolol Tartrate (Lopressor Injection -)  5 mg IVPB Q4H [facility]


   PRN Reason: HYPERTENSION


Miscellaneous (Duragesic Patch Waste)  1 each TD PRN PRN


   PRN Reason: PAIN


Miscellaneous (Lidoderm Patch Removal)  1 each MC DAILY@2200 [facility]


   Last Admin: 08/01/17 22:25 Dose:  1 each


Ondansetron HCl (Zofran Injection)  4 mg IVPB Q6H PRN


   PRN Reason: NAUSEA AND/OR VOMITING


Petrolatum (Vaseline)  1 applic TP DAILY [facility]


   Last Admin: 08/01/17 11:04 Dose:  1 ea











- Objective


Vital Signs: 


 Vital Signs











Temperature  99 F   08/02/17 06:00


 


Pulse Rate  70   08/02/17 06:00


 


Respiratory Rate  20   08/02/17 06:00


 


Blood Pressure  147/60   08/02/17 06:00


 


O2 Sat by Pulse Oximetry (%)  98   08/01/17 21:00











Constitutional: Yes: No Distress


Eyes: Yes: Conjunctiva Clear


Cardiovascular: Yes: Regular Rate and Rhythm


Respiratory: Yes: CTA Bilaterally


Gastrointestinal: Yes: Soft


Edema: No


Neurological: Yes: Alert, Oriented


...Motor Strength: WNL


Psychiatric: Yes: WNL


Labs: 


 CBC, BMP





 08/02/17 06:30 





 08/02/17 06:30 





 INR, PTT











INR  1.06  (0.82-1.09)   07/31/17  12:56    








 Laboratory Tests











  08/02/17 08/02/17





  06:30 06:30


 


WBC  7.9  D 


 


Hgb  10.8 


 


Plt Count  296 


 


Sodium   141


 


Potassium   4.5


 


Creatinine   0.8














Assessment/Plan


IMP:


S/p robotic sigmoid colectomy with post op partial SBO returned to OR 7/31 for 

lysis of adhesions


CAD s/p PCI RCA 2009 w/ recent cath showing non-obstructive CAD w/ patent 

intervention site


HTN








REC:


Can use IV lopressor PRN with parameters if not tolerating PO meds


Resume ASA when feasible from surgical standpoint, can also give per rectum if 

not anticipated to take PO within next several days.


DVT prophylaxis (on SQ heparin)

## 2017-08-02 NOTE — PN
Progress Note (short form)





- Note


Progress Note: 


Pod # 14





No flatus or bm


afebrile, VSS


Abd: soft, mild RLQ tenderness


WBC = 7.9


k+ =4.5


CRE = 0.8


FUA = + distended sb loops, + contrast in colon


A: ileus


P: continue NGT


CONTINUE PPN


RECTAL STIMULATION

## 2017-08-03 RX ADMIN — LEUCINE, PHENYLALANINE, LYSINE, METHIONINE, ISOLEUCINE, VALINE, HISTIDINE, THREONINE, TRYPTOPHAN, ALANINE, GLYCINE, ARGININE, PROLINE, SERINE, TYROSINE, DEXTROSE SCH: 311; 238; 247; 170; 255; 247; 204; 179; 77; 880; 438; 489; 289; 213; 17; 5 INJECTION INTRAVENOUS at 22:37

## 2017-08-03 RX ADMIN — LEUCINE, PHENYLALANINE, LYSINE, METHIONINE, ISOLEUCINE, VALINE, HISTIDINE, THREONINE, TRYPTOPHAN, ALANINE, GLYCINE, ARGININE, PROLINE, SERINE, TYROSINE, DEXTROSE SCH: 311; 238; 247; 170; 255; 247; 204; 179; 77; 880; 438; 489; 289; 213; 17; 5 INJECTION INTRAVENOUS at 09:42

## 2017-08-03 RX ADMIN — HEPARIN SODIUM SCH UNIT: 5000 INJECTION, SOLUTION INTRAVENOUS; SUBCUTANEOUS at 22:36

## 2017-08-03 RX ADMIN — Medication SCH EACH: at 22:35

## 2017-08-03 RX ADMIN — FENTANYL TRANSDERMAL SCH PATCH: 25 PATCH, EXTENDED RELEASE TRANSDERMAL at 22:37

## 2017-08-03 RX ADMIN — I.V. FAT EMULSION SCH MLS/HR: 20 EMULSION INTRAVENOUS at 22:35

## 2017-08-03 RX ADMIN — BENZOCAINE AND MENTHOL PRN EACH: 15; 3.6 LOZENGE ORAL at 14:24

## 2017-08-03 RX ADMIN — FENTANYL TRANSDERMAL SCH: 25 PATCH, EXTENDED RELEASE TRANSDERMAL at 23:26

## 2017-08-03 RX ADMIN — LEUCINE, PHENYLALANINE, LYSINE, METHIONINE, ISOLEUCINE, VALINE, HISTIDINE, THREONINE, TRYPTOPHAN, ALANINE, GLYCINE, ARGININE, PROLINE, SERINE, TYROSINE, DEXTROSE SCH MLS/HR: 311; 238; 247; 170; 255; 247; 204; 179; 77; 880; 438; 489; 289; 213; 17; 5 INJECTION INTRAVENOUS at 14:18

## 2017-08-03 RX ADMIN — LIDOCAINE SCH PATCH: 50 PATCH TOPICAL at 09:41

## 2017-08-03 RX ADMIN — MULTIVITAMIN TABLET SCH TAB: TABLET at 09:40

## 2017-08-03 RX ADMIN — HEPARIN SODIUM SCH UNIT: 5000 INJECTION, SOLUTION INTRAVENOUS; SUBCUTANEOUS at 09:41

## 2017-08-03 RX ADMIN — BENZOCAINE AND MENTHOL PRN EACH: 15; 3.6 LOZENGE ORAL at 18:54

## 2017-08-03 RX ADMIN — ACETAMINOPHEN PRN MG: 10 INJECTION, SOLUTION INTRAVENOUS at 12:47

## 2017-08-03 RX ADMIN — BENZOCAINE AND MENTHOL PRN EACH: 15; 3.6 LOZENGE ORAL at 07:17

## 2017-08-03 RX ADMIN — Medication SCH EACH: at 09:41

## 2017-08-03 RX ADMIN — WHITE PETROLATUM SCH EA: 1 OINTMENT TOPICAL at 09:44

## 2017-08-03 RX ADMIN — METOPROLOL TARTRATE SCH MG: 25 TABLET, FILM COATED ORAL at 09:40

## 2017-08-03 RX ADMIN — BENZOCAINE AND MENTHOL PRN EACH: 15; 3.6 LOZENGE ORAL at 01:14

## 2017-08-03 RX ADMIN — METOPROLOL TARTRATE SCH MG: 25 TABLET, FILM COATED ORAL at 22:35

## 2017-08-03 RX ADMIN — ACETAMINOPHEN PRN MG: 10 INJECTION, SOLUTION INTRAVENOUS at 07:07

## 2017-08-03 RX ADMIN — ACETAMINOPHEN PRN MG: 10 INJECTION, SOLUTION INTRAVENOUS at 01:06

## 2017-08-03 RX ADMIN — LOSARTAN POTASSIUM SCH MG: 25 TABLET, FILM COATED ORAL at 09:40

## 2017-08-03 RX ADMIN — ACETAMINOPHEN PRN MG: 10 INJECTION, SOLUTION INTRAVENOUS at 18:48

## 2017-08-03 NOTE — PN
Progress Note (short form)





- Note


Progress Note: 


Pod #15





Passed flatus several times


Minimal NGT output


Tolerated clear liquids


Abd: no longer distended


A/P: resolved post-op SBO


Advance to full liquid diet in am

## 2017-08-03 NOTE — PN
Progress Note, Physician


History of Present Illness: 


seen and examined today in nad. NG tube still in place. no new complaints.








- Current Medication List


Current Medications: 


Active Medications





Acetaminophen (Ofirmev Injection -)  1,000 mg IVPB Q6H PRN


   Last Admin: 08/03/17 07:07 Dose:  1,000 mg


Albuterol Sulfate (Ventolin Hfa Inhaler -)  2 puff IH Q4H PRN


   PRN Reason: SHORT OF BREATH/WHEEZING


Benzocaine/Menthol (Cepacol Lozenge -)  1 each MM Q6H PRN


   PRN Reason: SORE THROAT


   Last Admin: 08/03/17 07:17 Dose:  1 each


Bupropion HCl (Wellbutrin Xl -)  150 mg PO DAILY [facility]


   Last Admin: 08/03/17 09:40 Dose:  150 mg


Heparin Sodium (Porcine) (Heparin -)  5,000 unit SQ BID [facility]


   Last Admin: 08/03/17 09:41 Dose:  5,000 unit


Amino Acids (Clinimix -)  1,000 mls @ 84 mls/hr IV Q12H [facility]


   Last Admin: 08/03/17 09:42 Dose:  Not Given


Fat Emulsion Intravenous (Intralipid -)  250 mls @ 20.833 mls/hr IV DAILY@2200 

[facility]


   Last Admin: 08/02/17 22:24 Dose:  20.833 mls/hr


Lidocaine (Lidoderm Patch -)  1 patch TP DAILY [facility]


   Last Admin: 08/03/17 09:41 Dose:  1 patch


Losartan Potassium (Cozaar -)  25 mg PO DAILY [facility]


   Last Admin: 08/03/17 09:40 Dose:  25 mg


Metoprolol Tartrate (Lopressor -)  25 mg PO BID [facility]


   Last Admin: 08/03/17 09:40 Dose:  25 mg


Metoprolol Tartrate (Lopressor Injection -)  5 mg IVPB Q4H PRN


   PRN Reason: HYPERTENSION


Miscellaneous (Lidoderm Patch Removal)  1 each MC DAILY@2200 [facility]


   Last Admin: 08/02/17 22:25 Dose:  1 each


Multivitamins/Minerals/Vitamin C (Tab-A-Vit -)  1 tab PO DAILY [facility]


   Last Admin: 08/03/17 09:40 Dose:  1 tab


Pt's Own (Non- Formulary)-Nexium 40mg  1 each PO DAILY [facility]


   Last Admin: 08/03/17 09:41 Dose:  1 each


Ondansetron HCl (Zofran Injection)  4 mg IVPB Q6H PRN


   PRN Reason: NAUSEA AND/OR VOMITING


Petrolatum (Vaseline)  1 applic TP DAILY [facility]


   Last Admin: 08/03/17 09:44 Dose:  1 ea











- Objective


Vital Signs: 


 Vital Signs











Temperature  98.2 F   08/03/17 10:00


 


Pulse Rate  64   08/03/17 10:00


 


Respiratory Rate  20   08/03/17 10:00


 


Blood Pressure  149/82   08/03/17 10:00


 


O2 Sat by Pulse Oximetry (%)  95   08/03/17 09:00











Constitutional: Yes: No Distress, Calm


Eyes: Yes: Conjunctiva Clear, EOM Intact


HENT: Yes: Atraumatic, Normocephalic, Other (NG tube)


Cardiovascular: Yes: Regular Rate and Rhythm, S1, S2.  No: Bradycardia, 

Tachycardia, Pulse Irregular, Bruit, JVD, Gallop, Murmur, Rub, S3, S4, 

Varicosities


Respiratory: Yes: Regular, CTA Bilaterally.  No: Rales, Rhonchi, Wheezes


Gastrointestinal: Yes: Soft


Edema: No


Peripheral Pulses WNL: Yes


Peripheral Pulses: Left Doralis Pedis: 2+, Right Dorsalis Pedis: 2+


Neurological: Yes: Alert, Oriented


Psychiatric: Yes: Alert, Oriented


Labs: 


 CBC, BMP





 08/02/17 06:30 





 08/02/17 06:30 





 INR, PTT











INR  1.06  (0.82-1.09)   07/31/17  12:56    














- ....Imaging


Chest X-ray: Report Reviewed, Image Reviewed


EKG: Report Reviewed, Image Reviewed


Other: Report Reviewed, Image Reviewed





Assessment/Plan


IMP:


S/p robotic sigmoid colectomy with post op partial SBO returned to OR 7/31 for 

lysis of adhesions


CAD s/p PCI RCA 2009 w/ recent cath showing non-obstructive CAD w/ patent 

intervention site


HTN





REC:


Cont Losartan and Lopressor po as long as tolerates po


If not tolerating po can use IV lopressor PRN


Resume ASA when possible from a surgical standpoint

## 2017-08-04 RX ADMIN — BENZOCAINE AND MENTHOL PRN EACH: 15; 3.6 LOZENGE ORAL at 01:45

## 2017-08-04 RX ADMIN — MULTIVITAMIN TABLET SCH: TABLET at 09:56

## 2017-08-04 RX ADMIN — ASPIRIN 81 MG SCH MG: 81 TABLET ORAL at 12:44

## 2017-08-04 RX ADMIN — Medication SCH EACH: at 23:43

## 2017-08-04 RX ADMIN — WHITE PETROLATUM SCH EA: 1 OINTMENT TOPICAL at 09:56

## 2017-08-04 RX ADMIN — METOPROLOL TARTRATE SCH MG: 25 TABLET, FILM COATED ORAL at 09:54

## 2017-08-04 RX ADMIN — LEUCINE, PHENYLALANINE, LYSINE, METHIONINE, ISOLEUCINE, VALINE, HISTIDINE, THREONINE, TRYPTOPHAN, ALANINE, GLYCINE, ARGININE, PROLINE, SERINE, TYROSINE, DEXTROSE SCH: 311; 238; 247; 170; 255; 247; 204; 179; 77; 880; 438; 489; 289; 213; 17; 5 INJECTION INTRAVENOUS at 23:38

## 2017-08-04 RX ADMIN — ACETAMINOPHEN PRN MG: 10 INJECTION, SOLUTION INTRAVENOUS at 01:00

## 2017-08-04 RX ADMIN — Medication SCH EACH: at 09:54

## 2017-08-04 RX ADMIN — HEPARIN SODIUM SCH UNIT: 5000 INJECTION, SOLUTION INTRAVENOUS; SUBCUTANEOUS at 09:56

## 2017-08-04 RX ADMIN — ONDANSETRON PRN MG: 2 INJECTION INTRAMUSCULAR; INTRAVENOUS at 23:47

## 2017-08-04 RX ADMIN — LEUCINE, PHENYLALANINE, LYSINE, METHIONINE, ISOLEUCINE, VALINE, HISTIDINE, THREONINE, TRYPTOPHAN, ALANINE, GLYCINE, ARGININE, PROLINE, SERINE, TYROSINE, DEXTROSE SCH: 311; 238; 247; 170; 255; 247; 204; 179; 77; 880; 438; 489; 289; 213; 17; 5 INJECTION INTRAVENOUS at 09:55

## 2017-08-04 RX ADMIN — LOSARTAN POTASSIUM SCH MG: 25 TABLET, FILM COATED ORAL at 09:54

## 2017-08-04 RX ADMIN — I.V. FAT EMULSION SCH MLS/HR: 20 EMULSION INTRAVENOUS at 23:42

## 2017-08-04 RX ADMIN — ACETAMINOPHEN PRN MG: 10 INJECTION, SOLUTION INTRAVENOUS at 06:59

## 2017-08-04 RX ADMIN — HEPARIN SODIUM SCH UNIT: 5000 INJECTION, SOLUTION INTRAVENOUS; SUBCUTANEOUS at 23:43

## 2017-08-04 RX ADMIN — LIDOCAINE SCH PATCH: 50 PATCH TOPICAL at 09:55

## 2017-08-04 RX ADMIN — METOPROLOL TARTRATE SCH MG: 25 TABLET, FILM COATED ORAL at 22:43

## 2017-08-04 NOTE — PN
Progress Note, Physician


Chief Complaint: 


NGT out


No CV complaints





- Current Medication List


Current Medications: 


Active Medications





Acetaminophen (Ofirmev Injection -)  1,000 mg IVPB Q6H PRN


   Last Admin: 08/04/17 06:59 Dose:  1,000 mg


Albuterol Sulfate (Ventolin Hfa Inhaler -)  2 puff IH Q4H PRN


   PRN Reason: SHORT OF BREATH/WHEEZING


Benzocaine/Menthol (Cepacol Lozenge -)  1 each MM Q6H PRN


   PRN Reason: SORE THROAT


   Last Admin: 08/04/17 01:45 Dose:  1 each


Bupropion HCl (Wellbutrin Xl -)  150 mg PO DAILY [facility]


   Last Admin: 08/03/17 09:40 Dose:  150 mg


Docusate Sodium (Colace Liquid -)  300 mg PO DAILY [facility]


   PRN Reason: CONSTIPATION


Fentanyl (Duragesic 25mcg Patch -)  1 patch TD Q72H [facility]


   Stop: 08/10/17 20:00


   Last Admin: 08/03/17 23:26 Dose:  Not Given


Heparin Sodium (Porcine) (Heparin -)  5,000 unit SQ BID [facility]


   Last Admin: 08/03/17 22:36 Dose:  5,000 unit


Amino Acids (Clinimix -)  1,000 mls @ 84 mls/hr IV Q12H [facility]


   Last Admin: 08/03/17 22:37 Dose:  Not Given


Fat Emulsion Intravenous (Intralipid -)  250 mls @ 20.833 mls/hr IV DAILY@2200 

[facility]


   Last Admin: 08/03/17 22:35 Dose:  20.833 mls/hr


Lidocaine (Lidoderm Patch -)  1 patch TP DAILY [facility]


   Last Admin: 08/03/17 09:41 Dose:  1 patch


Losartan Potassium (Cozaar -)  25 mg PO DAILY [facility]


   Last Admin: 08/03/17 09:40 Dose:  25 mg


Metoprolol Tartrate (Lopressor -)  25 mg PO BID [facility]


   Last Admin: 08/03/17 22:35 Dose:  25 mg


Metoprolol Tartrate (Lopressor Injection -)  5 mg IVPB Q4H PRN


   PRN Reason: HYPERTENSION


Miscellaneous (Lidoderm Patch Removal)  1 each MC DAILY@2200 [facility]


   Last Admin: 08/03/17 22:35 Dose:  1 each


Miscellaneous (Duragesic Patch Waste)  1 each TD PRN PRN


   PRN Reason: PAIN


Multivitamins/Minerals/Vitamin C (Tab-A-Vit -)  1 tab PO DAILY [facility]


   Last Admin: 08/03/17 09:40 Dose:  1 tab


Pt's Own (Non- Formulary)-Nexium 40mg  1 each PO DAILY [facility]


   Last Admin: 08/03/17 09:41 Dose:  1 each


Ondansetron HCl (Zofran Injection)  4 mg IVPB Q6H PRN


   PRN Reason: NAUSEA AND/OR VOMITING


Petrolatum (Vaseline)  1 applic TP DAILY [facility]


   Last Admin: 08/03/17 09:44 Dose:  1 ea











- Objective


Vital Signs: 


 Vital Signs











Temperature  98.3 F   08/04/17 06:00


 


Pulse Rate  61   08/04/17 06:00


 


Respiratory Rate  20   08/04/17 06:00


 


Blood Pressure  151/72   08/04/17 06:00


 


O2 Sat by Pulse Oximetry (%)  95   08/03/17 21:00











Constitutional: Yes: No Distress


Neck: Yes: Supple


Cardiovascular: Yes: Regular Rate and Rhythm


Respiratory: Yes: CTA Bilaterally


Gastrointestinal: Yes: Soft


Edema: No


Neurological: Yes: Alert, Oriented


...Motor Strength: WNL


Psychiatric: Yes: Alert, Oriented


Labs: 


 CBC, BMP





 08/02/17 06:30 





 08/02/17 06:30 





 INR, PTT











INR  1.06  (0.82-1.09)   07/31/17  12:56    














Assessment/Plan


IMP:


S/p robotic sigmoid colectomy with post op partial SBO returned to OR 7/31 for 

lysis of adhesions


CAD s/p PCI RCA 2009 w/ recent cath showing non-obstructive CAD w/ patent 

intervention site


HTN





REC:


Cont Losartan and Lopressor po: BP well controlled now.


Resume ASA 81mg daily today

## 2017-08-04 NOTE — PN
Progress Note (short form)





- Note


Progress Note: 


tolerating full liquids


+ flatus


Abd: wounds healed, soft, mils RLQ tenderness


A/P: DOING WELL


advance diet gradually


resume all p.o. medS


d.c Tylenol IV

## 2017-08-05 RX ADMIN — LEUCINE, PHENYLALANINE, LYSINE, METHIONINE, ISOLEUCINE, VALINE, HISTIDINE, THREONINE, TRYPTOPHAN, ALANINE, GLYCINE, ARGININE, PROLINE, SERINE, TYROSINE, DEXTROSE SCH MLS/HR: 311; 238; 247; 170; 255; 247; 204; 179; 77; 880; 438; 489; 289; 213; 17; 5 INJECTION INTRAVENOUS at 09:39

## 2017-08-05 RX ADMIN — Medication SCH EACH: at 09:39

## 2017-08-05 RX ADMIN — LEUCINE, PHENYLALANINE, LYSINE, METHIONINE, ISOLEUCINE, VALINE, HISTIDINE, THREONINE, TRYPTOPHAN, ALANINE, GLYCINE, ARGININE, PROLINE, SERINE, TYROSINE, DEXTROSE SCH MLS/HR: 311; 238; 247; 170; 255; 247; 204; 179; 77; 880; 438; 489; 289; 213; 17; 5 INJECTION INTRAVENOUS at 19:51

## 2017-08-05 RX ADMIN — METOPROLOL TARTRATE SCH MG: 25 TABLET, FILM COATED ORAL at 09:38

## 2017-08-05 RX ADMIN — HEPARIN SODIUM SCH UNIT: 5000 INJECTION, SOLUTION INTRAVENOUS; SUBCUTANEOUS at 09:39

## 2017-08-05 RX ADMIN — WHITE PETROLATUM SCH EA: 1 OINTMENT TOPICAL at 09:41

## 2017-08-05 RX ADMIN — METOPROLOL TARTRATE SCH MG: 25 TABLET, FILM COATED ORAL at 22:22

## 2017-08-05 RX ADMIN — LOSARTAN POTASSIUM SCH MG: 25 TABLET, FILM COATED ORAL at 09:38

## 2017-08-05 RX ADMIN — HEPARIN SODIUM SCH UNIT: 5000 INJECTION, SOLUTION INTRAVENOUS; SUBCUTANEOUS at 22:22

## 2017-08-05 RX ADMIN — MULTIVITAMIN TABLET SCH: TABLET at 09:43

## 2017-08-05 RX ADMIN — LIDOCAINE SCH PATCH: 50 PATCH TOPICAL at 09:38

## 2017-08-05 RX ADMIN — POLYETHYLENE GLYCOL 3350 SCH GM: 17 POWDER, FOR SOLUTION ORAL at 14:24

## 2017-08-05 RX ADMIN — MULTIVITAMIN TABLET SCH TAB: TABLET at 09:38

## 2017-08-05 RX ADMIN — I.V. FAT EMULSION SCH MLS/HR: 20 EMULSION INTRAVENOUS at 22:22

## 2017-08-05 RX ADMIN — Medication SCH EACH: at 22:22

## 2017-08-05 RX ADMIN — ASPIRIN 81 MG SCH MG: 81 TABLET ORAL at 09:38

## 2017-08-05 NOTE — PN
Progress Note, Physician





- Current Medication List


Current Medications: 


Active Medications





Albuterol Sulfate (Ventolin Hfa Inhaler -)  2 puff IH Q4H PRN


   PRN Reason: SHORT OF BREATH/WHEEZING


Aspirin (Asa -)  81 mg PO DAILY [facility]


   Last Admin: 08/04/17 12:44 Dose:  81 mg


Benzocaine/Menthol (Cepacol Lozenge -)  1 each MM Q6H PRN


   PRN Reason: SORE THROAT


   Last Admin: 08/04/17 01:45 Dose:  1 each


Bupropion HCl (Wellbutrin Xl -)  150 mg PO DAILY [facility]


   Last Admin: 08/04/17 09:54 Dose:  150 mg


Docusate Sodium (Colace Liquid -)  300 mg PO DAILY [facility]


   PRN Reason: CONSTIPATION


Fentanyl (Duragesic 50mcg Patch -)  1 patch TD Q72H [facility]


   Last Admin: 08/04/17 12:43 Dose:  1 patch


Heparin Sodium (Porcine) (Heparin -)  5,000 unit SQ BID [facility]


   Last Admin: 08/04/17 23:43 Dose:  5,000 unit


Amino Acids (Clinimix -)  1,000 mls @ 84 mls/hr IV Q12H [facility]


   Last Admin: 08/04/17 23:38 Dose:  Not Given


Fat Emulsion Intravenous (Intralipid -)  250 mls @ 20.833 mls/hr IV DAILY@2200 

[facility]


   Last Admin: 08/04/17 23:42 Dose:  20.833 mls/hr


Lidocaine (Lidoderm Patch -)  1 patch TP DAILY [facility]


   Last Admin: 08/04/17 09:55 Dose:  1 patch


Losartan Potassium (Cozaar -)  25 mg PO DAILY [facility]


   Last Admin: 08/04/17 09:54 Dose:  25 mg


Metoprolol Tartrate (Lopressor -)  25 mg PO BID [facility]


   Last Admin: 08/04/17 22:43 Dose:  25 mg


Metoprolol Tartrate (Lopressor Injection -)  5 mg IVPB Q4H PRN


   PRN Reason: HYPERTENSION


Miscellaneous (Lidoderm Patch Removal)  1 each MC DAILY@2200 [facility]


   Last Admin: 08/04/17 23:43 Dose:  1 each


Miscellaneous (Duragesic Patch Waste)  1 each TD PRN PRN


   Stop: 08/11/17 12:14


Multivitamins/Minerals/Vitamin C (Tab-A-Vit -)  1 tab PO DAILY [facility]


   Last Admin: 08/04/17 09:56 Dose:  Not Given


Pt's Own (Non- Formulary)-Nexium 40mg  1 each PO DAILY [facility]


   Last Admin: 08/04/17 09:54 Dose:  1 each


Ondansetron HCl (Zofran Injection)  4 mg IVPB Q6H PRN


   PRN Reason: NAUSEA AND/OR VOMITING


   Last Admin: 08/04/17 23:47 Dose:  4 mg


Petrolatum (Vaseline)  1 applic TP DAILY [facility]


   Last Admin: 08/04/17 09:56 Dose:  1 ea











- Objective


Vital Signs: 


 Vital Signs











Temperature  98.2 F   08/05/17 08:34


 


Pulse Rate  61   08/05/17 08:34


 


Respiratory Rate  19   08/05/17 08:34


 


Blood Pressure  113/67   08/05/17 08:34


 


O2 Sat by Pulse Oximetry (%)  97   08/04/17 21:00











Eyes: Yes: WNL, Conjunctiva Clear, EOM Intact


HENT: Yes: WNL, Atraumatic, Normocephalic


Neck: Yes: WNL, Supple, Trachea Midline


Cardiovascular: Yes: WNL, Regular Rate and Rhythm


Respiratory: Yes: WNL, Regular, CTA Bilaterally


Gastrointestinal: Yes: Soft


Genitourinary: Yes: WNL


Musculoskeletal: Yes: WNL


Extremities: Yes: WNL


Edema: No


Integumentary: Yes: WNL


Neurological: Yes: WNL, Alert, Oriented


...Motor Strength: WNL


Psychiatric: Yes: WNL


Labs: 


 CBC, BMP





 08/02/17 06:30 





 08/02/17 06:30 





 INR, PTT











INR  1.06  (0.82-1.09)   07/31/17  12:56    














Assessment/Plan


S/p robotic sigmoid colectomy with post op partial SBO returned to OR 7/31 for 

lysis of adhesions


CAD s/p PCI RCA 2009 w/ recent cath showing non-obstructive CAD w/ patent 

intervention site


HTN





REC:


Cont Losartan and Lopressor po: BP well controlled now.


Resume ASA 81mg daily today

## 2017-08-05 NOTE — PN
Progress Note (short form)





- Note


Progress Note: 


Tolerating full liquid


Passed flatus and small firm stool


continues to c/o tolerable RLQ pain and tenderness


Afebrile, VSS


Abd: obese, mild RLQ and RUQ port site tenderness


A/P: stable, partial SBO resolving


advance to soft diet


d/c colace and start Miralax

## 2017-08-05 NOTE — HP
Admitting History and Physical





- Admission


Chief Complaint: Pt was seen on 7/19/17 however H&P note was entered by Dr Rowe 

so I did not save my note


History of Present Illness: 


Pt is a morbidly obese 60 y/o female w/ PMH significant for depression, morbid 

obesity, HTN, HLD, CAD w/ stents who is being admitted for sigmoid colectomy 

gor diverticulitis 





- Past Medical History


Cardiovascular: Yes: CAD, HTN, Hyperlipdemia


Gastrointestinal: Yes: Diverticulitis, GERD


Psych: Yes: Depression





- Smoking History


Smoking history: Former smoker


Have you smoked in the past 12 months: No


If you are a former smoker, when did you quit?: 2008





- Alcohol/Substance Use


Hx Alcohol Use: No





Home Medications





- Allergies


Allergies/Adverse Reactions: 


 Allergies











Allergy/AdvReac Type Severity Reaction Status Date / Time


 


clarithromycin [From Biaxin] Allergy   Verified 07/18/17 15:02


 


codeine Allergy   Verified 07/18/17 15:02


 


naproxen Allergy  itchy Verified 07/18/17 15:02


 


Penicillins Allergy   Verified 07/18/17 15:02


 


lactose AdvReac   Verified 07/23/17 12:50














- Home Medications


Home Medications: 


Ambulatory Orders





Acetaminophen [Tylenol .Extra-Strength -] 1,000 mg PO Q6H 04/24/17 


Albuterol Sulfate [Proair Respiclick] 90 mcg IH QID 04/24/17 


Aspirin Coated [Ecotrin -] 81 mg PO DAILY 04/24/17 


Bupropion HCl [Bupropion Xl] 150 mg PO DAILY 04/24/17 


Esomeprazole Magnesium [Nexium 24Hr] 40 mg PO DAILY 04/24/17 


Furosemide [Lasix -] 20 mg PO Q2D 04/24/17 


Melatonin 5 mg PO HS 04/24/17 


Simvastatin [Zocor -] 40 mg PO HS 04/24/17 











Family Disease History





- Family Disease History


Family History: Unremarkable





Physical Examination


Vital Signs: 


 Vital Signs











Temperature  97.9 F   08/05/17 14:44


 


Pulse Rate  59 L  08/05/17 14:44


 


Respiratory Rate  16   08/05/17 14:44


 


Blood Pressure  107/55   08/05/17 14:44


 


O2 Sat by Pulse Oximetry (%)  98   08/05/17 09:00











Constitutional: Yes: Well Nourished


Neck: Yes: WNL, Supple


Cardiovascular: Yes: WNL, Regular Rate and Rhythm


Respiratory: Yes: WNL, Regular, CTA Bilaterally


Gastrointestinal: Yes: WNL, Normal Bowel Sounds, Soft


Musculoskeletal: Yes: WNL


Extremities: Yes: WNL


Edema: No


Neurological: Yes: WNL, Alert, Oriented


...Motor Strength: WNL


Labs: 


 CBC, BMP





 08/02/17 06:30 





 08/02/17 06:30 











Problem List





- Problems


(1) Diverticulitis


Assessment/Plan: 


S/P robotic lap colectomy w/ cystoscopy and uretheral stent placement





Code(s): K57.92 - DVTRCLI OF INTEST, PART UNSP, W/O PERF OR ABSCESS W/O BLEED   

Qualifiers: 


     Diverticulitis site: large intestine     Diverticulitis bleeding: without 

bleeding     Diverticulitis complication: with perforation        Qualified Code

(s): K57.20 - Diverticulitis of large intestine with perforation and abscess 

without bleeding  





(2) HTN (hypertension)


Code(s): I10 - ESSENTIAL (PRIMARY) HYPERTENSION   





(3) HLD (hyperlipidemia)


Code(s): E78.5 - HYPERLIPIDEMIA, UNSPECIFIED   





(4) Depression


Code(s): F32.9 - MAJOR DEPRESSIVE DISORDER, SINGLE EPISODE, UNSPECIFIED

## 2017-08-06 RX ADMIN — POLYETHYLENE GLYCOL 3350 SCH GM: 17 POWDER, FOR SOLUTION ORAL at 10:15

## 2017-08-06 RX ADMIN — LEUCINE, PHENYLALANINE, LYSINE, METHIONINE, ISOLEUCINE, VALINE, HISTIDINE, THREONINE, TRYPTOPHAN, ALANINE, GLYCINE, ARGININE, PROLINE, SERINE, TYROSINE, DEXTROSE SCH MLS/HR: 311; 238; 247; 170; 255; 247; 204; 179; 77; 880; 438; 489; 289; 213; 17; 5 INJECTION INTRAVENOUS at 10:24

## 2017-08-06 RX ADMIN — ONDANSETRON PRN MG: 2 INJECTION INTRAMUSCULAR; INTRAVENOUS at 02:23

## 2017-08-06 RX ADMIN — WHITE PETROLATUM SCH EA: 1 OINTMENT TOPICAL at 10:16

## 2017-08-06 RX ADMIN — HEPARIN SODIUM SCH UNIT: 5000 INJECTION, SOLUTION INTRAVENOUS; SUBCUTANEOUS at 10:16

## 2017-08-06 RX ADMIN — Medication SCH EACH: at 10:15

## 2017-08-06 RX ADMIN — METOPROLOL TARTRATE SCH MG: 25 TABLET, FILM COATED ORAL at 21:39

## 2017-08-06 RX ADMIN — Medication SCH EACH: at 21:39

## 2017-08-06 RX ADMIN — LOSARTAN POTASSIUM SCH MG: 25 TABLET, FILM COATED ORAL at 10:15

## 2017-08-06 RX ADMIN — LIDOCAINE SCH PATCH: 50 PATCH TOPICAL at 10:16

## 2017-08-06 RX ADMIN — MULTIVITAMIN TABLET SCH: TABLET at 10:16

## 2017-08-06 RX ADMIN — HEPARIN SODIUM SCH UNIT: 5000 INJECTION, SOLUTION INTRAVENOUS; SUBCUTANEOUS at 21:39

## 2017-08-06 RX ADMIN — METOPROLOL TARTRATE SCH MG: 25 TABLET, FILM COATED ORAL at 10:15

## 2017-08-06 RX ADMIN — ASPIRIN 81 MG SCH MG: 81 TABLET ORAL at 10:15

## 2017-08-06 NOTE — PN
Progress Note (short form)





- Note


Progress Note: 


Patient is passing stool and flatus.


Tolerating soft diet


Abd: soft, wounds well healed


A/P: clinically improved


D/C PPN, advance to regular diet


D/C planning for am

## 2017-08-06 NOTE — PN
Progress Note, Physician


History of Present Illness: 


Very long d/w pt about stopping fentanyl however pt feels she is still having 

pain





- Current Medication List


Current Medications: 


Active Medications





Acetaminophen (Tylenol -)  650 mg PO Q6H PRN


   PRN Reason: FEVER OR PAIN


Albuterol Sulfate (Ventolin Hfa Inhaler -)  2 puff IH Q4H PRN


   PRN Reason: SHORT OF BREATH/WHEEZING


Aspirin (Asa -)  81 mg PO DAILY [facility]


   Last Admin: 08/06/17 10:15 Dose:  81 mg


Benzocaine/Menthol (Cepacol Lozenge -)  1 each MM Q6H PRN


   PRN Reason: SORE THROAT


   Last Admin: 08/04/17 01:45 Dose:  1 each


Bupropion HCl (Wellbutrin Xl -)  150 mg PO DAILY [facility]


   Last Admin: 08/06/17 10:15 Dose:  150 mg


Fentanyl (Duragesic 25mcg Patch -)  1 patch TD Q72H [facility]


Heparin Sodium (Porcine) (Heparin -)  5,000 unit SQ BID [facility]


   Last Admin: 08/06/17 21:39 Dose:  5,000 unit


Lidocaine (Lidoderm Patch -)  1 patch TP DAILY [facility]


   Last Admin: 08/06/17 10:16 Dose:  1 patch


Losartan Potassium (Cozaar -)  25 mg PO DAILY [facility]


   Last Admin: 08/06/17 10:15 Dose:  25 mg


Metoprolol Tartrate (Lopressor -)  25 mg PO BID [facility]


   Last Admin: 08/06/17 21:39 Dose:  25 mg


Metoprolol Tartrate (Lopressor Injection -)  5 mg IVPB Q4H PRN


   PRN Reason: HYPERTENSION


Miscellaneous (Lidoderm Patch Removal)  1 each MC DAILY@2200 [facility]


   Last Admin: 08/06/17 21:39 Dose:  1 each


Miscellaneous (Duragesic Patch Waste)  1 each TD PRN PRN


Multivitamins/Minerals/Vitamin C (Tab-A-Vit -)  1 tab PO DAILY [facility]


   Last Admin: 08/06/17 10:16 Dose:  Not Given


Pt's Own (Non- Formulary)-Nexium 40mg  1 each PO DAILY [facility]


   Last Admin: 08/06/17 10:15 Dose:  1 each


Ondansetron HCl (Zofran Injection)  4 mg IVPB Q6H PRN


   PRN Reason: NAUSEA AND/OR VOMITING


   Last Admin: 08/06/17 02:23 Dose:  4 mg


Petrolatum (Vaseline)  1 applic TP DAILY [facility]


   Last Admin: 08/06/17 10:16 Dose:  1 ea


Polyethylene Glycol (Miralax (For Daily Use) -)  17 gm PO DAILY [facility]


   Last Admin: 08/06/17 10:15 Dose:  17 gm











- Objective


Vital Signs: 


 Vital Signs











Temperature  98.0 F   08/06/17 14:45


 


Pulse Rate  71   08/06/17 21:37


 


Respiratory Rate  18   08/06/17 21:37


 


Blood Pressure  139/63   08/06/17 21:37


 


O2 Sat by Pulse Oximetry (%)  98   08/06/17 09:00











Constitutional: Yes: Well Nourished


Eyes: Yes: WNL


Neck: Yes: WNL


Cardiovascular: Yes: WNL, Regular Rate and Rhythm


Respiratory: Yes: WNL, Regular, CTA Bilaterally


Gastrointestinal: Yes: WNL, Soft, Other ((+) incisional tenderness)


Labs: 


 CBC, BMP





 08/02/17 06:30 





 08/02/17 06:30 





 INR, PTT











INR  1.06  (0.82-1.09)   07/31/17  12:56    














Problem List





- Problems


(1) Diverticulitis


Assessment/Plan: 


S/P robotic lap colectomy w/ cystoscopy and uretheral stent placement


S/p lysis of adhesions


Pt tolerating diet


DC planning as per surgery


Advised pt that we should stop fentanyl





Code(s): K57.92 - DVTRCLI OF INTEST, PART UNSP, W/O PERF OR ABSCESS W/O BLEED   

Qualifiers: 


     Diverticulitis site: large intestine     Diverticulitis bleeding: without 

bleeding     Diverticulitis complication: with perforation        Qualified Code

(s): K57.20 - Diverticulitis of large intestine with perforation and abscess 

without bleeding  





(2) HTN (hypertension)


Assessment/Plan: 


Cont asa/metoprolol/lasix


Code(s): I10 - ESSENTIAL (PRIMARY) HYPERTENSION   





(3) HLD (hyperlipidemia)


Code(s): E78.5 - HYPERLIPIDEMIA, UNSPECIFIED   





(4) Depression


Code(s): F32.9 - MAJOR DEPRESSIVE DISORDER, SINGLE EPISODE, UNSPECIFIED

## 2017-08-06 NOTE — PN
Progress Note, Physician





- Current Medication List


Current Medications: 


Active Medications





Albuterol Sulfate (Ventolin Hfa Inhaler -)  2 puff IH Q4H PRN


   PRN Reason: SHORT OF BREATH/WHEEZING


Aspirin (Asa -)  81 mg PO DAILY [facility]


   Last Admin: 08/05/17 09:38 Dose:  81 mg


Benzocaine/Menthol (Cepacol Lozenge -)  1 each MM Q6H PRN


   PRN Reason: SORE THROAT


   Last Admin: 08/04/17 01:45 Dose:  1 each


Bupropion HCl (Wellbutrin Xl -)  150 mg PO DAILY [facility]


   Last Admin: 08/05/17 09:38 Dose:  150 mg


Fentanyl (Duragesic 50mcg Patch -)  1 patch TD Q72H [facility]


   Last Admin: 08/04/17 12:43 Dose:  1 patch


Heparin Sodium (Porcine) (Heparin -)  5,000 unit SQ BID [facility]


   Last Admin: 08/05/17 22:22 Dose:  5,000 unit


Amino Acids (Clinimix -)  1,000 mls @ 84 mls/hr IV Q12H [facility]


   Last Admin: 08/05/17 19:51 Dose:  84 mls/hr


Fat Emulsion Intravenous (Intralipid -)  250 mls @ 20.833 mls/hr IV DAILY@2200 

[facility]


   Last Admin: 08/05/17 22:22 Dose:  20.833 mls/hr


Lidocaine (Lidoderm Patch -)  1 patch TP DAILY [facility]


   Last Admin: 08/05/17 09:38 Dose:  1 patch


Losartan Potassium (Cozaar -)  25 mg PO DAILY [facility]


   Last Admin: 08/05/17 09:38 Dose:  25 mg


Metoprolol Tartrate (Lopressor -)  25 mg PO BID [facility]


   Last Admin: 08/05/17 22:22 Dose:  25 mg


Metoprolol Tartrate (Lopressor Injection -)  5 mg IVPB Q4H PRN


   PRN Reason: HYPERTENSION


Miscellaneous (Lidoderm Patch Removal)  1 each MC DAILY@2200 [facility]


   Last Admin: 08/05/17 22:22 Dose:  1 each


Miscellaneous (Duragesic Patch Waste)  1 each TD PRN PRN


   Stop: 08/11/17 12:14


Multivitamins/Minerals/Vitamin C (Tab-A-Vit -)  1 tab PO DAILY [facility]


   Last Admin: 08/05/17 09:43 Dose:  Not Given


Pt's Own (Non- Formulary)-Nexium 40mg  1 each PO DAILY [facility]


   Last Admin: 08/05/17 09:39 Dose:  1 each


Ondansetron HCl (Zofran Injection)  4 mg IVPB Q6H PRN


   PRN Reason: NAUSEA AND/OR VOMITING


   Last Admin: 08/06/17 02:23 Dose:  4 mg


Petrolatum (Vaseline)  1 applic TP DAILY [facility]


   Last Admin: 08/05/17 09:41 Dose:  1 ea


Polyethylene Glycol (Miralax (For Daily Use) -)  17 gm PO DAILY [facility]


   Last Admin: 08/05/17 14:24 Dose:  17 gm











- Objective


Vital Signs: 


 Vital Signs











Temperature  98.3 F   08/06/17 08:22


 


Pulse Rate  60   08/06/17 08:22


 


Respiratory Rate  20   08/06/17 08:22


 


Blood Pressure  110/55   08/06/17 08:22


 


O2 Sat by Pulse Oximetry (%)  99   08/05/17 21:00











Eyes: Yes: WNL, Conjunctiva Clear, EOM Intact


HENT: Yes: WNL, Atraumatic, Normocephalic


Neck: Yes: WNL, Supple, Trachea Midline


Cardiovascular: Yes: WNL, Regular Rate and Rhythm


Respiratory: Yes: WNL, Regular, CTA Bilaterally


Gastrointestinal: Yes: Tenderness


Genitourinary: Yes: WNL


Musculoskeletal: Yes: WNL


Extremities: Yes: WNL


Edema: No


Integumentary: Yes: WNL


Neurological: Yes: WNL, Alert, Oriented


...Motor Strength: WNL


Psychiatric: Yes: WNL


Labs: 


 CBC, BMP





 08/02/17 06:30 





 08/02/17 06:30 





 INR, PTT











INR  1.06  (0.82-1.09)   07/31/17  12:56    














Assessment/Plan


S/p robotic sigmoid colectomy with post op partial SBO returned to OR 7/31 for 

lysis of adhesions


CAD s/p PCI RCA 2009 w/ recent cath showing non-obstructive CAD w/ patent 

intervention site


HTN





REC:


Cont Losartan and Lopressor po: BP well controlled now.


Resume ASA 81mg daily today

## 2017-08-07 RX ADMIN — HEPARIN SODIUM SCH UNIT: 5000 INJECTION, SOLUTION INTRAVENOUS; SUBCUTANEOUS at 21:39

## 2017-08-07 RX ADMIN — WHITE PETROLATUM SCH: 1 OINTMENT TOPICAL at 10:43

## 2017-08-07 RX ADMIN — METOPROLOL TARTRATE SCH MG: 25 TABLET, FILM COATED ORAL at 10:40

## 2017-08-07 RX ADMIN — ONDANSETRON HYDROCHLORIDE PRN MG: 4 TABLET, FILM COATED ORAL at 00:55

## 2017-08-07 RX ADMIN — ASPIRIN 81 MG SCH MG: 81 TABLET ORAL at 10:40

## 2017-08-07 RX ADMIN — METOPROLOL TARTRATE SCH MG: 25 TABLET, FILM COATED ORAL at 21:39

## 2017-08-07 RX ADMIN — POLYETHYLENE GLYCOL 3350 SCH GM: 17 POWDER, FOR SOLUTION ORAL at 10:51

## 2017-08-07 RX ADMIN — ONDANSETRON HYDROCHLORIDE PRN MG: 4 TABLET, FILM COATED ORAL at 15:02

## 2017-08-07 RX ADMIN — Medication SCH EACH: at 10:42

## 2017-08-07 RX ADMIN — MULTIVITAMIN TABLET SCH: TABLET at 10:45

## 2017-08-07 RX ADMIN — LOSARTAN POTASSIUM SCH MG: 25 TABLET, FILM COATED ORAL at 10:40

## 2017-08-07 RX ADMIN — HEPARIN SODIUM SCH UNIT: 5000 INJECTION, SOLUTION INTRAVENOUS; SUBCUTANEOUS at 10:41

## 2017-08-07 RX ADMIN — Medication SCH EACH: at 21:40

## 2017-08-07 RX ADMIN — LIDOCAINE SCH PATCH: 50 PATCH TOPICAL at 10:41

## 2017-08-07 RX ADMIN — ONDANSETRON HYDROCHLORIDE PRN MG: 4 TABLET, FILM COATED ORAL at 22:17

## 2017-08-07 NOTE — DS
Physical Examination


Vital Signs: 


 Vital Signs











Temperature  98.5 F   08/07/17 14:43


 


Pulse Rate  97 H  08/07/17 14:43


 


Respiratory Rate  18   08/07/17 14:43


 


Blood Pressure  106/70   08/07/17 14:43


 


O2 Sat by Pulse Oximetry (%)  98   08/06/17 09:00











Findings/Remarks: 


61 y.o. female with recurrent diverticulitis underwent robotic partial 

colectomy on July 19, 2017. Post-op course was complicated by persistent 

partial SBO requiring return to the OR for laparoscopic lysis of adhesions. 

Patient is currently on regular diet with intermittent RLQ pain and occasional 

nausea. Patient has daily BM'S and passing flatus.





Constitutional: Yes: Anxious


HENT: Yes: Normocephalic


Neck: Yes: Supple


Cardiovascular: Yes: Regular Rate and Rhythm


Respiratory: Yes: CTA Bilaterally


Gastrointestinal: Yes: Soft, Abdomen, Obese, Tenderness (at RLQ, BUT NO REBOUND)


...Rectal Exam: Yes: Deferred


Renal/: Yes: WNL


Wound/Incision: Yes: Clean/Dry, Staples Removed


Neurological: Yes: WNL


Labs: 


 CBC, BMP





 08/02/17 06:30 





 08/02/17 06:30 











Discharge Summary


Reason For Visit: ACUTE DIVERTICULITIS


Current Active Problems





Partial small bowel obstruction (Acute) 


Status post laparoscopic-assisted sigmoidectomy (Acute) 








Condition: Good





- Instructions


Diet, Activity, Other Instructions: 


Dr Rowe Discharge Instructions





Dear KEITH MCKEON,





Post Operative Instructions





Physical activity





Resume your normal everyday activity as tolerated no heavy lifting or exercise 

until seen by your surgeon. You may walk unlimited amounts of and climb stairs. 

You may resume driving the car when you feel safe and comfortable behind the 

wheel.





Wound care


 May shower, no baths.





Diet


There are no dietary restrictions. Eat healthy, high-fiber foods. Drink 6 to 8 

glasses of liquid each day. This will assist in keeping your bowels are regular.





Pain management


You may take Tylenol or acetaminophen or Ibuprofen (for example, Motrin, Advil 

etc.) Any pain prescription medication ordered should be taken as prescribed 

for moderate to severe pain.





Call Dr. Rowe for any of the following:





Severe pain not relieved by medication


Fever of 101 or higher


Excessive bleeding or drainage on dressing


Inability to urinate








Call the office at [phone] for an appointment in seven days.








Disposition: HOME





- Home Medications


Comprehensive Discharge Medication List: 


Ambulatory Orders





Acetaminophen [Tylenol .Extra-Strength -] 1,000 mg PO Q6H 04/24/17 


Albuterol Sulfate [Proair Respiclick] 90 mcg IH QID 04/24/17 


Aspirin Coated [Ecotrin -] 81 mg PO DAILY 04/24/17 


Bupropion HCl [Bupropion Xl] 150 mg PO DAILY 04/24/17 


Esomeprazole Magnesium [Nexium 24Hr] 40 mg PO DAILY 04/24/17 


Furosemide [Lasix -] 20 mg PO Q2D 04/24/17 


Melatonin 5 mg PO HS 04/24/17 


Simvastatin [Zocor -] 40 mg PO HS 04/24/17 


Oxycodone HCl/Acetaminophen [Percocet 5-325 mg Tablet] 1 tab PO Q6H PRN #20 

tablet MDD 4 08/07/17 


Polyethylene Glycol 3350 [Miralax (For Daily Use) -] 17 gm PO DAILY #1 bottle 08 /07/17

## 2017-08-08 VITALS — SYSTOLIC BLOOD PRESSURE: 93 MMHG | DIASTOLIC BLOOD PRESSURE: 52 MMHG | TEMPERATURE: 98.3 F | HEART RATE: 62 BPM

## 2017-08-08 RX ADMIN — LIDOCAINE SCH: 50 PATCH TOPICAL at 09:24

## 2017-08-08 RX ADMIN — LOSARTAN POTASSIUM SCH: 25 TABLET, FILM COATED ORAL at 09:20

## 2017-08-08 RX ADMIN — MULTIVITAMIN TABLET SCH: TABLET at 09:20

## 2017-08-08 RX ADMIN — WHITE PETROLATUM SCH EA: 1 OINTMENT TOPICAL at 09:23

## 2017-08-08 RX ADMIN — ONDANSETRON HYDROCHLORIDE PRN MG: 4 TABLET, FILM COATED ORAL at 11:24

## 2017-08-08 RX ADMIN — Medication SCH EACH: at 09:22

## 2017-08-08 RX ADMIN — HEPARIN SODIUM SCH UNIT: 5000 INJECTION, SOLUTION INTRAVENOUS; SUBCUTANEOUS at 09:21

## 2017-08-08 RX ADMIN — METOPROLOL TARTRATE SCH: 25 TABLET, FILM COATED ORAL at 09:20

## 2017-08-08 RX ADMIN — ASPIRIN 81 MG SCH MG: 81 TABLET ORAL at 09:19

## 2017-08-08 RX ADMIN — POLYETHYLENE GLYCOL 3350 SCH GM: 17 POWDER, FOR SOLUTION ORAL at 09:22

## 2019-12-03 NOTE — CONS
DATE OF CONSULTATION:  04/24/2017

 

REASON FOR CONSULTATION:  Perforated viscus.

 

This is an emergency room consultation requested by the emergency room physician.

 

BRIEF HISTORY:  This is a 61-year-old female with previous history of diverticulitis

and also a previous history of peptic ulcer disease who takes Plavix for coronary

stents that were placed 7 years ago.  She also last had a colonoscopy approximately 1

year ago.  She states that last Thursday she was suffered from bronchitis and then on

Friday she developed abdominal pain.  She also had nausea as well as vomiting.  The

pain persisted to today, although it was not as bad as it was Friday, and because of

that, she came to the [facility] Emergency Room.  While here, she had a CAT scan of

her abdomen and pelvis, which showed that she had a perforated viscus with 2-3 small

droplets of air above the liver.  She appeared to possibly have mild inflammation of

her sigmoid, distal, descending colon likely consistent with diverticulitis.  She was

known to have a white blood cell count of 13.6.  Her blood pressure was initially a

little low at 107/78 but improved with some IV fluid.  She also had a low-grade fever

of 100.7.  A surgical consultation was requested.  The patient was given Levaquin and

Flagyl and was sent to the regular medical floor.  

 

PAST MEDICAL HISTORY:  Significant for diverticulitis, fibroids, coronary artery

disease.

 

PAST SURGICAL HISTORY:  Nil.

 

ALLERGIES:  CLARITHROMYCIN, CODEINE, and PENICILLIN.

 

SOCIAL HISTORY:  Negative for alcohol.  Negative for tobacco.

 

HOME MEDICATIONS:  Include Plavix, albuterol, vitamin C, Wellbutrin, Tylenol, Nexium,

and Lasix as well as Zocor.

 

FAMILY HISTORY:  Significant for mother with breast cancer.

 

REVIEW OF SYSTEMS: 

General:  Denies fatigue or malaise.

Cardiac:  Denies chest pain or palpitations.

Respiratory:  Denies shortness of breath or wheeze.

Gastrointestinal:  As in HPI.  Denies diarrhea.  Denies blood in her stool.  Denies

flatus.  

Genitourinary:  Denies dysuria.

Musculoskeletal:  Denies joint pain, joint swelling.

Psychiatric:  Denies anxiety, depression, or hearing voices.

 

PHYSICAL EXAMINATION: 

General:  This is an obese 61-year-old female in no distress.

Vital Signs:  She is currently afebrile with a T-max of 100.7, heart rate 65, blood

pressure 122/65, respiratory rate 18.  

HEENT:  Her head is normocephalic.  Sclerae anicteric.

Neck:  Supple.

Chest:  Clear.

Abdomen:  Soft.  It is obese.  She is mildly distended.  She has no tenderness in the

upper abdomen.  She has moderate tenderness in the left lower quadrant.  She has no

obvious surgical scars.

Extremities:  Trace edema.

 

LABORATORY DATA:  White blood cell count 13.6 with an 80% shift.  Her chemistries are

unremarkable with a mildly elevated BUN 21 and a mildly elevated creatinine of 1.1. 

her liver function tests are unremarkable.  Her coagulation profile is unremarkable

as well, although it is noted that Plavix does not show up on an INR.  On review of

her imaging, it is as stated in HPI.

 

ASSESSMENT:  This is a 61-year-old female with lower abdominal pain, nausea,

vomiting, leukocytosis, lower abdominal tenderness, and CAT scan findings of

perforated viscus with likely diverticulitis.  Clinically this is complicated

diverticulitis.  The patient would be at very high risk for surgery right now in the

setting of Plavix as well as previous coronary artery disease.  At this point,

although she has been offered surgery, she declines it.  She does not wish to

undertake the high risk of surgery.  She is also concerned that she would require a

colostomy if this was indeed diverticulitis.  Since the patient appears to be

nontoxic, we will continue with medical management, per patient's wishes.  Would keep

n.p.o.  I will change Levaquin to Rocephin for better Escherichia coli coverage. 

Continue Flagyl.  I also will treat her with Protonix twice a day in case this is

actually a perforated peptic ulcer as well.  Obviously, Plavix should be held.  Her

last dose was given on Sunday, and surgery after next Monday would be safer if

necessary.  Hopefully medical management will be successful and the patient could

consider interval sigmoid colectomy to prevent future recurrence.  This operation

could hopefully be done without requiring a colonoscopy possibly laparoscopically,

and the patient would have the advantage of being off anticoagulation.  The patient

may also wish to consider with the medical team if she still requires Plavix as her

stents are 7 years old.

 

 

 

DO FRANCINE GOETZ/[id number]

DD: 04/24/2017 11:57

DT: 04/24/2017 14:04

Job #:  [id number]

## 2021-01-22 PROBLEM — F32.9 MAJOR DEPRESSIVE DISORDER, SINGLE EPISODE, UNSPECIFIED: Chronic | Status: ACTIVE | Noted: 2017-02-24

## 2021-01-22 PROBLEM — K27.9 PEPTIC ULCER, SITE UNSPECIFIED, UNSPECIFIED AS ACUTE OR CHRONIC, WITHOUT HEMORRHAGE OR PERFORATION: Chronic | Status: ACTIVE | Noted: 2017-02-24

## 2021-01-22 PROBLEM — J44.9 CHRONIC OBSTRUCTIVE PULMONARY DISEASE, UNSPECIFIED: Chronic | Status: ACTIVE | Noted: 2017-02-24

## 2021-01-22 PROBLEM — E78.5 HYPERLIPIDEMIA, UNSPECIFIED: Chronic | Status: ACTIVE | Noted: 2017-02-24

## 2021-01-22 PROBLEM — I10 ESSENTIAL (PRIMARY) HYPERTENSION: Chronic | Status: ACTIVE | Noted: 2017-02-24

## 2021-01-22 PROBLEM — K21.9 GASTRO-ESOPHAGEAL REFLUX DISEASE WITHOUT ESOPHAGITIS: Chronic | Status: ACTIVE | Noted: 2017-02-24

## 2021-01-22 PROBLEM — Z00.00 ENCOUNTER FOR PREVENTIVE HEALTH EXAMINATION: Status: ACTIVE | Noted: 2021-01-22

## 2021-01-25 ENCOUNTER — APPOINTMENT (OUTPATIENT)
Dept: HEART AND VASCULAR | Facility: CLINIC | Age: 66
End: 2021-01-25
Payer: MEDICARE

## 2021-01-25 VITALS — SYSTOLIC BLOOD PRESSURE: 120 MMHG | HEART RATE: 74 BPM | DIASTOLIC BLOOD PRESSURE: 82 MMHG

## 2021-01-25 PROCEDURE — 99204 OFFICE O/P NEW MOD 45 MIN: CPT

## 2021-01-25 NOTE — ASSESSMENT
[FreeTextEntry1] : 66 yo F with PMH CAD s/p PCI ( RCA 2009 and repeat cath 2017 patent stent) , HTN, HLD here for first evaluation\par \par CHEST PAIN\par -in the setting of known CAD with symptoms on 2 antianginals, agree with assessing coronary anatomy with cardiac catheterization\par -agree with right heart cath for eval of BACA if non obstructive CAD \par -the patient is scheduled for right and left heart cath at [facility] on 2/10/2021 (reports she cannot sooner)\par -Covid test on 2/8/2021 in [place] office\par -cont with aspirin\par -cont with statin\par -cont witth amlodipine and ranexa\par \par \par HLD\par -cont with statin, \par -repeat fasting lipid panel and consider starting atorvastatin 40 mg daily \par -ldl 169, chol 253, trig 192  hdl 46 on 9/2020\par \par \par HTN \par -well controlled \par -cont with current meds \par \par f/u post cath\par

## 2021-01-25 NOTE — HISTORY OF PRESENT ILLNESS
[FreeTextEntry1] : 66 yo F with PMH CAD s/p PCI ( RCA 2009 and repeat cath 2017 patent stent) , HTN, HLD here for first evaluation.\par The patient reports episodes of chest pain and she is referred here by Dr. Christy for evaluation of BACA and episodes of chest pain and referral for R and L heart cath. \par The patient reports episodes of resting chest pain which she cannot better define. She is very anxious but she reports BACA and chest pain are becoming more frequent \par Reports living in a very cluttered house and every time she cleans the house she becomes sob. \par She was started on two antianginals without improvement of her symptoms \par Denies  palpitations, syncope, presyncope, LE edema, orthopnea. \par \par PMH \par as above\par \par ALL\par PENICILLIN\par \par MEDS\par amlodipine 5 mg daily \par aspirin 81 mg daily \par ranolazine 500 mg po bid\par simvastatin 40 mg daily \par wellbutrin\par albuterol\par \par SH\par former smoker\par no etoh, no drugs\par \par \par NUCLEAR STRESS TEST 8/20/2020\par fixed inferior defect c/w attenuation artifact\par LVEF 50%\par \par EKG 10/8/2020 SR, wnl\par

## 2021-01-25 NOTE — PHYSICAL EXAM
[FreeTextEntry1] : obese, nad  [Normal Oral Mucosa] : normal oral mucosa [No Oral Pallor] : no oral pallor [No Oral Cyanosis] : no oral cyanosis [Normal Jugular Venous V Waves Present] : normal jugular venous V waves present [Normal Jugular Venous A Waves Present] : normal jugular venous A waves present [No Jugular Venous Whiting A Waves] : no jugular venous whiting A waves [Heart Rate And Rhythm] : heart rate and rhythm were normal [Heart Sounds] : normal S1 and S2 [Murmurs] : no murmurs present [Respiration, Rhythm And Depth] : normal respiratory rhythm and effort [Auscultation Breath Sounds / Voice Sounds] : lungs were clear to auscultation bilaterally [Exaggerated Use Of Accessory Muscles For Inspiration] : no accessory muscle use [Abdomen Soft] : soft [Abdomen Tenderness] : non-tender [Abdomen Mass (___ Cm)] : no abdominal mass palpated [Nail Clubbing] : no clubbing of the fingernails [Cyanosis, Localized] : no localized cyanosis [Petechial Hemorrhages (___cm)] : no petechial hemorrhages [] : no ischemic changes

## 2021-02-09 VITALS
TEMPERATURE: 98 F | WEIGHT: 214.95 LBS | RESPIRATION RATE: 15 BRPM | SYSTOLIC BLOOD PRESSURE: 146 MMHG | HEIGHT: 68 IN | HEART RATE: 80 BPM | DIASTOLIC BLOOD PRESSURE: 67 MMHG | OXYGEN SATURATION: 97 %

## 2021-02-09 RX ORDER — CHLORHEXIDINE GLUCONATE 213 G/1000ML
1 SOLUTION TOPICAL ONCE
Refills: 0 | Status: DISCONTINUED | OUTPATIENT
Start: 2021-02-10 | End: 2021-02-10

## 2021-02-09 NOTE — H&P ADULT - NSICDXPASTMEDICALHX_GEN_ALL_CORE_FT
PAST MEDICAL HISTORY:  Anxiety     CAD (coronary artery disease)     COPD (chronic obstructive pulmonary disease)     Depression     GERD (gastroesophageal reflux disease)     Heart failure, diastolic, chronic     Hyperlipidemia     Hypertension     PUD (peptic ulcer disease)

## 2021-02-09 NOTE — H&P ADULT - NSICDXPASTSURGICALHX_GEN_ALL_CORE_FT
PAST SURGICAL HISTORY:  No significant past surgical history      PAST SURGICAL HISTORY:  H/O  section     History of diverticulitis of colon s/p resections

## 2021-02-09 NOTE — H&P ADULT - ASSESSMENT
64 y/o F former smoker FHx of early MI's (Mother 60's, Father 40's), PMHx diverticulosis/diverticulitis (last tx with abx 2/2017), PUD (non-bleeding), GERD, depression/anxiety, asthma and COPD (no hospitalizations/ intubations, no home O2), undiagnosed NATALYA, Latent Tuberculosis (s/p abx treatement in 1995), HTN, HLD, chronic diastolic CHF (EF 60% by NST 2/2017), CAD DEENA pRCA (diagnostic cath @ [facility] 2/24/17 LM normal, prox LAD50%, FFR 0.91, mid LAD myocardial bridge, distal LCx 50%, pRCA stent patent) who was referred to Dr Espitia for epigastric burning and chest tightness 10/10 with palpitations during stressful/emotional periods and worsening of chronic BACA  now occurring for with minimal exertion for past year.   In light of pt's known risk factors as above, CCS angina class IV symptoms, abnormal NST pt recommended for cardiac catheterization with possible intervention if clinically warranted to assess CAD progression.    -H/H stable, no active bleeding, pt took aspirin 81 mg x1 this am. Loaded with plavix 600 mg x1 pre cath.   -EF 60%, euvolemic on exam, pre cath fluids with NS @ 75 cc/hr x 4 hrs     Risks & benefits of procedure and alternative therapy have been explained to the patient including but not limited to: allergic reaction, bleeding w/possible need for blood transfusion, infection, renal and vascular compromise, limb damage, arrhythmia, stroke, vessel dissection/perforation, Myocardial infarction, emergent CABG. Informed consent obtained and in chart.    66 y/o F former smoker FHx of early MI's (Mother 60's, Father 40's), PMHx diverticulosis/diverticulitis (last tx with abx 2/2017), PUD (non-bleeding), GERD, depression/anxiety, asthma and COPD (no hospitalizations/ intubations, no home O2), undiagnosed NATALYA, Latent Tuberculosis (s/p abx treatement in 1995), HTN, HLD, chronic diastolic CHF (EF 60% by NST 2/2017), CAD DEENA pRCA (diagnostic cath @ [facility] 2/24/17 LM normal, prox LAD50%, FFR 0.91, mid LAD myocardial bridge, distal LCx 50%, pRCA stent patent) who was referred to Dr Espitia for epigastric burning and chest tightness 10/10 with palpitations during stressful/emotional periods and worsening of chronic BACA  now occurring with minimal exertion for past year.   In light of pt's known risk factors as above, CCS angina class IV symptoms, abnormal NST pt recommended for cardiac catheterization with possible intervention if clinically warranted to assess CAD progression.    -H/H stable, no active bleeding, pt took aspirin 81 mg x1 this am. Loaded with plavix 600 mg x1 pre cath.   -EF 60%, euvolemic on exam, pre cath fluids with NS @ 75 cc/hr x 4 hrs     Risks & benefits of procedure and alternative therapy have been explained to the patient including but not limited to: allergic reaction, bleeding w/possible need for blood transfusion, infection, renal and vascular compromise, limb damage, arrhythmia, stroke, vessel dissection/perforation, Myocardial infarction, emergent CABG. Informed consent obtained and in chart.

## 2021-02-09 NOTE — H&P ADULT - NSICDXFAMILYHX_GEN_ALL_CORE_FT
FAMILY HISTORY:  Father  Still living? Unknown  Family history of heart attack, Age at diagnosis: 41-50    Mother  Still living? Unknown  Family history of heart attack, Age at diagnosis: 61-70

## 2021-02-09 NOTE — H&P ADULT - NSHPSOCIALHISTORY_GEN_ALL_CORE
former smoker, 1.5 ppdx 30 yrs, quit 13 yrs ago  etoh?  drug use? former smoker, 1.5 ppdx 30 yrs, quit 13 yrs ago  denies etoh and drug use

## 2021-02-09 NOTE — H&P ADULT - HISTORY OF PRESENT ILLNESS
[facility]   COVID: 2/8/21 @ [facility]   Cardiologist: Dr Espitia   Pharmacy:   Escort:    66 y/o F former smoker FHx of early MI's (Mother 60's, Father 40's), PMHx diverticulosis, PUD (non-bleeding), GERD, depression/anxiety, COPD (no hospitalizations/ intubations), Latent Tuberculosis (treatment?), HTN, HLD, chronic diastolic CHF (EF 60% by NST 2/2017, CAD DEENA pRCA (diagnostic cath @ [facility] 2/24/17 LM normal, prox LAD50%, FFR 0.91, mid LAD myocardial bridge, distal LCx 50%, pRCA stent patent) who was referred to Dr Espitia for _____/10 CP with BACA with ambulating/climbing ________ blocks/flights occurring for _____________ that has worsened despite treatment with antianginals.     Denies palpitations, dizziness, syncope, LE edema, orthopnea.     Per MD note NST 8/20/20: fixed inferior defect c/w attenuation artifact.     In light of pt's known risk factors as above, CCS angina class _______ symptoms, abnormal NST pt recommended for cardiac catheterization with possible intervention if clinically warranted to assess CAD progression.    cardiac cath @[facility] 2/24/2017:  non-obstructive CAD (LM normal, prox LAD50%, FFR 0.91, mid LAD myocardial bridge, distal LCx 50%, prox RCA stent patent, EF 55%, EDP 17).   [place]   COVID: 2/8/21 @ [facility] NEGATIVE (HIE)   Cardiologist: Dr Espitia   Pharmacy:   Escort:    64 y/o F former smoker FHx of early MI's (Mother 60's, Father 40's), PMHx diverticulosis, PUD (non-bleeding), GERD, depression/anxiety, COPD (no hospitalizations/ intubations), Latent Tuberculosis (treatment?), HTN, HLD, chronic diastolic CHF (EF 60% by NST 2/2017, CAD DEENA pRCA (diagnostic cath @ [facility] 2/24/17 LM normal, prox LAD50%, FFR 0.91, mid LAD myocardial bridge, distal LCx 50%, pRCA stent patent) who was referred to Dr Espitia for _____/10 CP with BACA with ambulating/climbing ________ blocks/flights occurring for _____________ that has worsened despite treatment with antianginals.     Denies palpitations, dizziness, syncope, LE edema, orthopnea.     Per MD note NST 8/20/20: fixed inferior defect c/w attenuation artifact.     In light of pt's known risk factors as above, CCS angina class _______ symptoms, abnormal NST pt recommended for cardiac catheterization with possible intervention if clinically warranted to assess CAD progression.    cardiac cath [facility] 2/24/2017:  non-obstructive CAD (LM normal, prox LAD50%, FFR 0.91, mid LAD myocardial bridge, distal LCx 50%, prox RCA stent patent, EF 55%, EDP 17).   COVID: 2/8/21 @ [facility] NEGATIVE (HIE)   Cardiologist: Dr Espitia   Pharmacy: [facility]   Escort: daughter     66 y/o F former smoker FHx of early MI's (Mother 60's, Father 40's), PMHx diverticulosis/diverticulitis (last tx with abx 2/2017), PUD (non-bleeding), GERD, depression/anxiety, asthma and COPD (no hospitalizations/ intubations, no home O2), undiagnosed NATALYA, Latent Tuberculosis (s/p abx treatement in 1995), HTN, HLD, chronic diastolic CHF (EF 60% by NST 2/2017), CAD DEENA pRCA (diagnostic cath @ [facility] 2/24/17 LM normal, prox LAD50%, FFR 0.91, mid LAD myocardial bridge, distal LCx 50%, pRCA stent patent) who was referred to Dr Espitia for epigastric burning and chest tightness 10/10 with palpitations during stressful/emotional periods and worsening of chronic BACA  now occurring for with minimal exertion for past year. Pt also reports 3 trips and falls in past month with no head trauma, and near syncopal episodes (seated to standing) with negative oupt work (normal ECHO summer 2020 and home monitors without arrhythmias). Of note pt with chronic orthopnea 2/2 COPD and undiagnosed NATALYA.  Denies palpitations, dizziness, syncope, LE edema, orthopnea.  Per MD note NST 8/20/20: fixed inferior defect c/w attenuation artifact.  In light of pt's known risk factors as above, CCS angina class IV symptoms, abnormal NST pt recommended for cardiac catheterization with possible intervention if clinically warranted to assess CAD progression.    cardiac cath @[facility] 2/24/2017:  non-obstructive CAD (LM normal, prox LAD50%, FFR 0.91, mid LAD myocardial bridge, distal LCx 50%, prox RCA stent patent, EF 55%, EDP 17).   COVID: 2/8/21 @ [facility] NEGATIVE (HIE)   Cardiologist: Dr Espitia   Pharmacy: [facility]   Escort: daughter     64 y/o F former smoker FHx of early MI's (Mother 60's, Father 40's), PMHx diverticulosis/diverticulitis (last tx with abx 2/2017), PUD (non-bleeding), GERD, depression/anxiety, asthma and COPD (no hospitalizations/ intubations, no home O2), undiagnosed NATALYA, Latent Tuberculosis (s/p abx treatement in 1995), HTN, HLD, chronic diastolic CHF (EF 60% by NST 2/2017), CAD DEENA pRCA (diagnostic cath @ [facility] 2/24/17 LM normal, prox LAD50%, FFR 0.91, mid LAD myocardial bridge, distal LCx 50%, pRCA stent patent) who was referred to Dr Espitia for epigastric burning and chest tightness 10/10 with palpitations during stressful/emotional periods and worsening of chronic BACA  now occurring with minimal exertion for past year. Pt also reports 3 trips and falls in past month with no head trauma, and near syncopal episodes (seated to standing) with negative oupt work (normal ECHO summer 2020 and home monitors without arrhythmias). Of note pt with chronic orthopnea 2/2 COPD and undiagnosed NATALYA.  Denies palpitations, dizziness, syncope, LE edema, orthopnea.  Per MD note NST 8/20/20: fixed inferior defect c/w attenuation artifact.  In light of pt's known risk factors as above, CCS angina class IV symptoms, abnormal NST pt recommended for cardiac catheterization with possible intervention if clinically warranted to assess CAD progression.    cardiac cath @[facility] 2/24/2017:  non-obstructive CAD (LM normal, prox LAD50%, FFR 0.91, mid LAD myocardial bridge, distal LCx 50%, prox RCA stent patent, EF 55%, EDP 17).

## 2021-02-10 ENCOUNTER — INPATIENT (INPATIENT)
Facility: HOSPITAL | Age: 66
LOS: 0 days | Discharge: HOME | DRG: 247 | End: 2021-02-11
Attending: INTERNAL MEDICINE | Admitting: INTERNAL MEDICINE
Payer: COMMERCIAL

## 2021-02-10 DIAGNOSIS — Z87.19 PERSONAL HISTORY OF OTHER DISEASES OF THE DIGESTIVE SYSTEM: Chronic | ICD-10-CM

## 2021-02-10 DIAGNOSIS — Z98.891 HISTORY OF UTERINE SCAR FROM PREVIOUS SURGERY: Chronic | ICD-10-CM

## 2021-02-10 LAB
A1C WITH ESTIMATED AVERAGE GLUCOSE RESULT: 6.7 % — HIGH (ref 4–5.6)
ALBUMIN SERPL ELPH-MCNC: 4.2 G/DL — SIGNIFICANT CHANGE UP (ref 3.3–5)
ALP SERPL-CCNC: 95 U/L — SIGNIFICANT CHANGE UP (ref 40–120)
ALT FLD-CCNC: 16 U/L — SIGNIFICANT CHANGE UP (ref 10–45)
ANION GAP SERPL CALC-SCNC: 15 MMOL/L — SIGNIFICANT CHANGE UP (ref 5–17)
APTT BLD: 29.7 SEC — SIGNIFICANT CHANGE UP (ref 27.5–35.5)
AST SERPL-CCNC: 19 U/L — SIGNIFICANT CHANGE UP (ref 10–40)
BASOPHILS # BLD AUTO: 0.03 K/UL — SIGNIFICANT CHANGE UP (ref 0–0.2)
BASOPHILS NFR BLD AUTO: 0.4 % — SIGNIFICANT CHANGE UP (ref 0–2)
BILIRUB SERPL-MCNC: 0.2 MG/DL — SIGNIFICANT CHANGE UP (ref 0.2–1.2)
BUN SERPL-MCNC: 26 MG/DL — HIGH (ref 7–23)
CALCIUM SERPL-MCNC: 9.2 MG/DL — SIGNIFICANT CHANGE UP (ref 8.4–10.5)
CHLORIDE SERPL-SCNC: 103 MMOL/L — SIGNIFICANT CHANGE UP (ref 96–108)
CHOLEST SERPL-MCNC: 153 MG/DL — SIGNIFICANT CHANGE UP
CK MB CFR SERPL CALC: 2.6 NG/ML — SIGNIFICANT CHANGE UP (ref 0–6.7)
CK SERPL-CCNC: 122 U/L — SIGNIFICANT CHANGE UP (ref 25–170)
CO2 SERPL-SCNC: 24 MMOL/L — SIGNIFICANT CHANGE UP (ref 22–31)
CREAT SERPL-MCNC: 1.28 MG/DL — SIGNIFICANT CHANGE UP (ref 0.5–1.3)
EOSINOPHIL # BLD AUTO: 0.14 K/UL — SIGNIFICANT CHANGE UP (ref 0–0.5)
EOSINOPHIL NFR BLD AUTO: 1.8 % — SIGNIFICANT CHANGE UP (ref 0–6)
ESTIMATED AVERAGE GLUCOSE: 146 MG/DL — HIGH (ref 68–114)
GLUCOSE SERPL-MCNC: 122 MG/DL — HIGH (ref 70–99)
HCT VFR BLD CALC: 35.3 % — SIGNIFICANT CHANGE UP (ref 34.5–45)
HDLC SERPL-MCNC: 44 MG/DL — LOW
HGB BLD-MCNC: 11.2 G/DL — LOW (ref 11.5–15.5)
IMM GRANULOCYTES NFR BLD AUTO: 0.6 % — SIGNIFICANT CHANGE UP (ref 0–1.5)
INR BLD: 1.01 — SIGNIFICANT CHANGE UP (ref 0.88–1.16)
LIPID PNL WITH DIRECT LDL SERPL: 84 MG/DL — SIGNIFICANT CHANGE UP
LYMPHOCYTES # BLD AUTO: 2.33 K/UL — SIGNIFICANT CHANGE UP (ref 1–3.3)
LYMPHOCYTES # BLD AUTO: 29.3 % — SIGNIFICANT CHANGE UP (ref 13–44)
MCHC RBC-ENTMCNC: 26.4 PG — LOW (ref 27–34)
MCHC RBC-ENTMCNC: 31.7 GM/DL — LOW (ref 32–36)
MCV RBC AUTO: 83.1 FL — SIGNIFICANT CHANGE UP (ref 80–100)
MONOCYTES # BLD AUTO: 0.84 K/UL — SIGNIFICANT CHANGE UP (ref 0–0.9)
MONOCYTES NFR BLD AUTO: 10.6 % — SIGNIFICANT CHANGE UP (ref 2–14)
NEUTROPHILS # BLD AUTO: 4.57 K/UL — SIGNIFICANT CHANGE UP (ref 1.8–7.4)
NEUTROPHILS NFR BLD AUTO: 57.3 % — SIGNIFICANT CHANGE UP (ref 43–77)
NON HDL CHOLESTEROL: 109 MG/DL — SIGNIFICANT CHANGE UP
NRBC # BLD: 0 /100 WBCS — SIGNIFICANT CHANGE UP (ref 0–0)
PLATELET # BLD AUTO: 234 K/UL — SIGNIFICANT CHANGE UP (ref 150–400)
POTASSIUM SERPL-MCNC: 3.6 MMOL/L — SIGNIFICANT CHANGE UP (ref 3.5–5.3)
POTASSIUM SERPL-SCNC: 3.6 MMOL/L — SIGNIFICANT CHANGE UP (ref 3.5–5.3)
PROT SERPL-MCNC: 7.2 G/DL — SIGNIFICANT CHANGE UP (ref 6–8.3)
PROTHROM AB SERPL-ACNC: 12.1 SEC — SIGNIFICANT CHANGE UP (ref 10.6–13.6)
RBC # BLD: 4.25 M/UL — SIGNIFICANT CHANGE UP (ref 3.8–5.2)
RBC # FLD: 13.9 % — SIGNIFICANT CHANGE UP (ref 10.3–14.5)
SODIUM SERPL-SCNC: 142 MMOL/L — SIGNIFICANT CHANGE UP (ref 135–145)
TRIGL SERPL-MCNC: 127 MG/DL — SIGNIFICANT CHANGE UP
WBC # BLD: 7.96 K/UL — SIGNIFICANT CHANGE UP (ref 3.8–10.5)
WBC # FLD AUTO: 7.96 K/UL — SIGNIFICANT CHANGE UP (ref 3.8–10.5)

## 2021-02-10 PROCEDURE — 93458 L HRT ARTERY/VENTRICLE ANGIO: CPT | Mod: 26,59

## 2021-02-10 PROCEDURE — 99222 1ST HOSP IP/OBS MODERATE 55: CPT

## 2021-02-10 PROCEDURE — 92978 ENDOLUMINL IVUS OCT C 1ST: CPT | Mod: 26,RC

## 2021-02-10 PROCEDURE — 92928 PRQ TCAT PLMT NTRAC ST 1 LES: CPT | Mod: RC

## 2021-02-10 PROCEDURE — 93010 ELECTROCARDIOGRAM REPORT: CPT | Mod: 77

## 2021-02-10 RX ORDER — CLOPIDOGREL BISULFATE 75 MG/1
75 TABLET, FILM COATED ORAL DAILY
Refills: 0 | Status: DISCONTINUED | OUTPATIENT
Start: 2021-02-10 | End: 2021-02-11

## 2021-02-10 RX ORDER — BUPROPION HYDROCHLORIDE 150 MG/1
150 TABLET, EXTENDED RELEASE ORAL DAILY
Refills: 0 | Status: DISCONTINUED | OUTPATIENT
Start: 2021-02-10 | End: 2021-02-11

## 2021-02-10 RX ORDER — SODIUM CHLORIDE 9 MG/ML
500 INJECTION INTRAMUSCULAR; INTRAVENOUS; SUBCUTANEOUS
Refills: 0 | Status: DISCONTINUED | OUTPATIENT
Start: 2021-02-10 | End: 2021-02-11

## 2021-02-10 RX ORDER — SIMVASTATIN 20 MG/1
40 TABLET, FILM COATED ORAL AT BEDTIME
Refills: 0 | Status: DISCONTINUED | OUTPATIENT
Start: 2021-02-10 | End: 2021-02-10

## 2021-02-10 RX ORDER — FUROSEMIDE 40 MG
20 TABLET ORAL
Refills: 0 | Status: DISCONTINUED | OUTPATIENT
Start: 2021-02-10 | End: 2021-02-11

## 2021-02-10 RX ORDER — ACETAMINOPHEN 500 MG
650 TABLET ORAL ONCE
Refills: 0 | Status: COMPLETED | OUTPATIENT
Start: 2021-02-10 | End: 2021-02-10

## 2021-02-10 RX ORDER — LANOLIN ALCOHOL/MO/W.PET/CERES
5 CREAM (GRAM) TOPICAL ONCE
Refills: 0 | Status: COMPLETED | OUTPATIENT
Start: 2021-02-10 | End: 2021-02-10

## 2021-02-10 RX ORDER — AMLODIPINE BESYLATE 2.5 MG/1
5 TABLET ORAL DAILY
Refills: 0 | Status: DISCONTINUED | OUTPATIENT
Start: 2021-02-10 | End: 2021-02-11

## 2021-02-10 RX ORDER — PANTOPRAZOLE SODIUM 20 MG/1
40 TABLET, DELAYED RELEASE ORAL
Refills: 0 | Status: DISCONTINUED | OUTPATIENT
Start: 2021-02-10 | End: 2021-02-11

## 2021-02-10 RX ORDER — SODIUM CHLORIDE 9 MG/ML
500 INJECTION INTRAMUSCULAR; INTRAVENOUS; SUBCUTANEOUS
Refills: 0 | Status: DISCONTINUED | OUTPATIENT
Start: 2021-02-10 | End: 2021-02-10

## 2021-02-10 RX ORDER — INFLUENZA VIRUS VACCINE 15; 15; 15; 15 UG/.5ML; UG/.5ML; UG/.5ML; UG/.5ML
0.5 SUSPENSION INTRAMUSCULAR ONCE
Refills: 0 | Status: DISCONTINUED | OUTPATIENT
Start: 2021-02-10 | End: 2021-02-10

## 2021-02-10 RX ORDER — ATORVASTATIN CALCIUM 80 MG/1
80 TABLET, FILM COATED ORAL AT BEDTIME
Refills: 0 | Status: DISCONTINUED | OUTPATIENT
Start: 2021-02-10 | End: 2021-02-11

## 2021-02-10 RX ORDER — POTASSIUM CHLORIDE 20 MEQ
40 PACKET (EA) ORAL ONCE
Refills: 0 | Status: COMPLETED | OUTPATIENT
Start: 2021-02-10 | End: 2021-02-10

## 2021-02-10 RX ORDER — ALBUTEROL 90 UG/1
2 AEROSOL, METERED ORAL
Refills: 0 | Status: DISCONTINUED | OUTPATIENT
Start: 2021-02-10 | End: 2021-02-11

## 2021-02-10 RX ORDER — CLOPIDOGREL BISULFATE 75 MG/1
600 TABLET, FILM COATED ORAL ONCE
Refills: 0 | Status: COMPLETED | OUTPATIENT
Start: 2021-02-10 | End: 2021-02-10

## 2021-02-10 RX ORDER — ASPIRIN/CALCIUM CARB/MAGNESIUM 324 MG
81 TABLET ORAL DAILY
Refills: 0 | Status: DISCONTINUED | OUTPATIENT
Start: 2021-02-10 | End: 2021-02-11

## 2021-02-10 RX ORDER — INFLUENZA VIRUS VACCINE 15; 15; 15; 15 UG/.5ML; UG/.5ML; UG/.5ML; UG/.5ML
0.7 SUSPENSION INTRAMUSCULAR ONCE
Refills: 0 | Status: DISCONTINUED | OUTPATIENT
Start: 2021-02-10 | End: 2021-02-11

## 2021-02-10 RX ADMIN — Medication 5 MILLIGRAM(S): at 21:49

## 2021-02-10 RX ADMIN — Medication 40 MILLIEQUIVALENT(S): at 12:57

## 2021-02-10 RX ADMIN — ATORVASTATIN CALCIUM 80 MILLIGRAM(S): 80 TABLET, FILM COATED ORAL at 21:49

## 2021-02-10 RX ADMIN — SODIUM CHLORIDE 75 MILLILITER(S): 9 INJECTION INTRAMUSCULAR; INTRAVENOUS; SUBCUTANEOUS at 16:19

## 2021-02-10 RX ADMIN — Medication 650 MILLIGRAM(S): at 21:49

## 2021-02-10 RX ADMIN — SODIUM CHLORIDE 75 MILLILITER(S): 9 INJECTION INTRAMUSCULAR; INTRAVENOUS; SUBCUTANEOUS at 12:37

## 2021-02-10 RX ADMIN — CLOPIDOGREL BISULFATE 600 MILLIGRAM(S): 75 TABLET, FILM COATED ORAL at 12:37

## 2021-02-10 NOTE — CONSULT NOTE ADULT - SUBJECTIVE AND OBJECTIVE BOX
Preventive Cardiology Consultation Note    CHIEF COMPLAINT: s/p cardiac catheterization requiring cardiovascular prevention optimization and education    HISTORY OF PRESENT ILLNESS: 66 y/o F, former smoker, with a FHx of early MI's (Mother 60's, Father 40's), and a PMHx diverticulosis/diverticulitis (last tx with abx 2017), PUD (non-bleeding), GERD, depression/anxiety, asthma and COPD (no hospitalizations/ intubations, no home O2), undiagnosed NATALYA, Latent Tuberculosis (s/p abx treatement in ), HTN, HLD, chronic diastolic CHF (EF 60% by NST 2017), CAD DEENA pRCA (diagnostic cath @ [facility] 17 LM normal, prox LAD50%, FFR 0.91, mid LAD myocardial bridge, distal LCx 50%, pRCA stent patent) who was referred to Dr Espitia for epigastric burning and chest tightness that occurred during stressful/emotional times, as well as worsening of chronic BACA for the past year. She also reports multiple near syncopal episodes. Patient is now s/p cardiac cath: successful PCI of pRCA ISR with DEENA x 1 after cutting balloon.    Review of systems otherwise negative.     Lifestyle History:  Mediterranean Diet Score (9 question survey) was 5.   (8-9: optimal, 6-7: near-optimal, 4-5: suboptimal, 0-3: markedly suboptimal)  Exercise: Patient reports exercising at a moderate level for <30 minutes per week.   Smoking: Former smoker (1.5 PPD x 30 years; quit 13 years ago).    Stress: Patient denies any stress.     PAST MEDICAL & SURGICAL HISTORY:  CAD (coronary artery disease)    Depression    Anxiety    GERD (gastroesophageal reflux disease)    PUD (peptic ulcer disease)    COPD (chronic obstructive pulmonary disease)    Heart failure, diastolic, chronic    Hyperlipidemia    Hypertension    History of diverticulitis of colon  s/p resections    H/O  section      FAMILY HISTORY:   Father  Family history of heart attack, Age at diagnosis: 41-50    Mother  Family history of heart attack, Age at diagnosis: 61-70.    Allergies:   Biaxin (Vomiting)  codeine (Anaphylaxis)  morphine (Other)  penicillin (Hives)      HOME MEDICATIONS:   · 	NexIUM 40 mg oral delayed release capsule: Last Dose Taken:  , 1 cap(s) orally once a day  · 	aspirin 81 mg oral tablet: Last Dose Taken: 10-Feb-2021 AM, 1 tab(s) orally once a day  · 	Wellbutrin  mg/24 hours oral tablet, extended release: Last Dose Taken:  , 1 tab(s) orally every 24 hours  · 	albuterol 90 mcg/inh inhalation aerosol: Last Dose Taken:  , 2 puff(s) inhaled 4 times a day, As Needed  · 	amLODIPine 5 mg oral tablet: Last Dose Taken: 10-Feb-2021 AM, 1 tab(s) orally once a day  · 	Lasix 20 mg oral tablet: Last Dose Taken:  , 1 tab(s) orally every other day  · 	Mapap Arthritis Pain 650 mg oral tablet, extended release: Last Dose Taken:  , 1 tab(s) orally once a day  · 	Creon: Last Dose Taken:  , 2 tab(s) orally once a day  · 	Creon: Last Dose Taken:  , 1 tab(s) orally once a day (at bedtime)  · 	simvastatin 40 mg oral tablet: 1 tab(s) orally once a day (at bedtime)      PHYSICAL EXAM:  T(C): 35.9 (21 @ 14:10), Max: 36.6 (02-10-21 @ 17:50)  T(F): 96.7 (21 @ 14:10), Max: 97.8 (02-10-21 @ 17:50)  HR: 71 (21 @ 11:56) (61 - 85)  BP: 153/69 (21 @ 11:56) (124/60 - 166/74)  RR: 19 (21 @ 11:56) (18 - 20)  SpO2: 98% (21 @ 11:56) (97% - 99%)  Height (cm): 172.7 (02-10 @ 17:50)  Weight (kg): 97.5 (02-10 @ 17:50)  BMI (kg/m2): 32.7 (02-10 @ 17:50)  	  Gen- awake, conversive  Head-NCAT; eyes: no corneal arcus noted b/l; no xanthelasmas   Neck- no JVD, no carotid bruit b/l  Respiratory- respirations non-labored; clear to auscultation b/l   Cardiovascular- S1S2, RRR, no murmur, no LE edema b/l, distal pulses 2+ b/l  Neurology- alert and oriented x 3, no focal deficits  Psych- normal affect; appearance, verbalizations, behaviors are appropriate   Skin- no lesions, no rashes, no xanthomas     LABS:	                        10.4   6.55  )-----------( 197      ( 2021 07:01 )             33.5     02-11    139  |  107  |  17  ----------------------------<  128<H>  4.2   |  24  |  0.87    Ca    9.0      2021 07:01  Mg     2.2         TPro  6.1  /  Alb  3.7  /  TBili  0.2  /  DBili  x   /  AST  17  /  ALT  13  /  AlkPhos  75        Cholesterol, Serum: 153 mg/dL (02-10 @ 10:17)  HDL Cholesterol, Serum: 44 mg/dL (02-10 @ 10:17)  Triglycerides, Serum: 127 mg/dL (02-10 @ 10:17)  LDL Cholesterol, Serum: 84 mg/dL (02-10 @ 10:17)  HbA1c 6.7%    ASSESSMENT/RECOMMENDATIONS: 	  Patient's dietary, exercise and overall lifestyle habits were reviewed. The concept of atherosclerosis and its systemic nature was discussed with a focus on the need to get all cardiovascular risk factors to goal. At this time, I would like to make the following recommendations to optimize atherosclerotic risk factors.     RECOMMENDATIONS:   Anti-platelet Therapy: DAPT per interventionalist recommendation.   Lipid Therapy: High dose statin therapy would likely benefit this patient. She was switched from simvastatin 40mg to atorvastatin 80mg in the hospital, which is a more appropriate medication for her at this time.    Hypertension: Blood pressures during this stay were well-controlled.   Mediterranean Diet Score is 5. Some suggestions include continue incorporating 2 or more servings per day of vegetables, fruits, and whole grains. Increase intake of fish and legumes/beans to 2 or more servings per week. Aim to increase intake of healthy fats, such as olive oil and avocados, and have a handful of nuts/seeds most days. Reduce red/processed meat consumption to 2 or fewer times per week.   Exercise: Recommended gradually increasing activity to 30-45 minutes most days of the week once cleared by referring cardiologist. Cardiac rehab might benefit this patient and is covered by major insurance plans (other than co-pays), please refer.   Medication Adherence: Patient has no issues with adherence at this time.   Smoking: This patient is a non-smoker.   Obesity/Overweight: The patient was advised about specific mechanisms such as reduced portions and increased activity for efforts toward weight loss.   Glucometabolic State: Based on HbA1c 6.7% today, patient is in the diabetic range. We would recommend following up in the outpatient setting to confirm her status and initiate treatment as necessary. We would recommend the possibility of a GLP-1 agonist or SGLT-2 inhibitor, as these newer agents have cardiovascular benefits as well as glucose control, in addition to promoting weight loss. The goal is to utilize more guideline-based cardioprotective agents that will reduce the risk of recurrent CV events.   Sleep Apnea: The patient is at risk for sleep apnea. She reports never sleeping well overall. It could be beneficial to at least start with a home sleep study to assess for NATALYA.   Psychological Stress: The patient appears to be coping with stressors well at this time.     Thank you for the opportunity to see this patient. Please feel free to contact Prevention if there are any questions, or if you feel that your patient would benefit from continued follow-up visits with the Program.    Radha Lora, [facility]  Cardiovascular Prevention     Emna Haywood MD  System Director, Cardiovascular Prevention

## 2021-02-10 NOTE — PROGRESS NOTE ADULT - SUBJECTIVE AND OBJECTIVE BOX
PROCEDURE: RHC, CORONARY ANGIOGRAM, LHC, LVGRAM, IVUS,PCI  INDICATION: UNSTABLE ANGINA  ATTENDING: DR. WARREN ESPITIA MD   ACCESS: RRA    FINDINGS   RHC  PCW= 6 mmHg  PA= 24/7  RV= 27/2  RA= 3     LM= normal   Lcx= moderate diffuse   LAD= moderate diffuse   RCA= 80% ISR    LVEF:65 %  LVEDP: 18 mmHg    PROCEDURE:   Successful PCI of pRCA ISR with DEENA x 1 ([place] 3.0 x 18) after cutting balloon ( 3.0 x 12) with excellent angiographic result with good stent apposition confirmed by IVUS.     A/P  -aspirin 81 mg daily indefinitely  -clopidogrel 75 mg daily for at least one year  -atorvastatin 80 mg daily  -please schedule outpatient follow up with Dr. Espitia  on Monday 2/22/2021 in [place] office (Dr. Dominguez/[place] tel: [phone])

## 2021-02-11 ENCOUNTER — TRANSCRIPTION ENCOUNTER (OUTPATIENT)
Age: 66
End: 2021-02-11

## 2021-02-11 VITALS — TEMPERATURE: 98 F

## 2021-02-11 LAB
ALBUMIN SERPL ELPH-MCNC: 3.7 G/DL — SIGNIFICANT CHANGE UP (ref 3.3–5)
ALP SERPL-CCNC: 75 U/L — SIGNIFICANT CHANGE UP (ref 40–120)
ALT FLD-CCNC: 13 U/L — SIGNIFICANT CHANGE UP (ref 10–45)
ANION GAP SERPL CALC-SCNC: 8 MMOL/L — SIGNIFICANT CHANGE UP (ref 5–17)
AST SERPL-CCNC: 17 U/L — SIGNIFICANT CHANGE UP (ref 10–40)
BILIRUB SERPL-MCNC: 0.2 MG/DL — SIGNIFICANT CHANGE UP (ref 0.2–1.2)
BUN SERPL-MCNC: 17 MG/DL — SIGNIFICANT CHANGE UP (ref 7–23)
CALCIUM SERPL-MCNC: 9 MG/DL — SIGNIFICANT CHANGE UP (ref 8.4–10.5)
CHLORIDE SERPL-SCNC: 107 MMOL/L — SIGNIFICANT CHANGE UP (ref 96–108)
CO2 SERPL-SCNC: 24 MMOL/L — SIGNIFICANT CHANGE UP (ref 22–31)
CREAT SERPL-MCNC: 0.87 MG/DL — SIGNIFICANT CHANGE UP (ref 0.5–1.3)
GLUCOSE SERPL-MCNC: 128 MG/DL — HIGH (ref 70–99)
HCT VFR BLD CALC: 33.5 % — LOW (ref 34.5–45)
HCV AB S/CO SERPL IA: 0.04 S/CO — SIGNIFICANT CHANGE UP
HCV AB SERPL-IMP: SIGNIFICANT CHANGE UP
HGB BLD-MCNC: 10.4 G/DL — LOW (ref 11.5–15.5)
MAGNESIUM SERPL-MCNC: 2.2 MG/DL — SIGNIFICANT CHANGE UP (ref 1.6–2.6)
MCHC RBC-ENTMCNC: 26.6 PG — LOW (ref 27–34)
MCHC RBC-ENTMCNC: 31 GM/DL — LOW (ref 32–36)
MCV RBC AUTO: 85.7 FL — SIGNIFICANT CHANGE UP (ref 80–100)
NRBC # BLD: 0 /100 WBCS — SIGNIFICANT CHANGE UP (ref 0–0)
PLATELET # BLD AUTO: 197 K/UL — SIGNIFICANT CHANGE UP (ref 150–400)
POTASSIUM SERPL-MCNC: 4.2 MMOL/L — SIGNIFICANT CHANGE UP (ref 3.5–5.3)
POTASSIUM SERPL-SCNC: 4.2 MMOL/L — SIGNIFICANT CHANGE UP (ref 3.5–5.3)
PROT SERPL-MCNC: 6.1 G/DL — SIGNIFICANT CHANGE UP (ref 6–8.3)
RBC # BLD: 3.91 M/UL — SIGNIFICANT CHANGE UP (ref 3.8–5.2)
RBC # FLD: 14.1 % — SIGNIFICANT CHANGE UP (ref 10.3–14.5)
SODIUM SERPL-SCNC: 139 MMOL/L — SIGNIFICANT CHANGE UP (ref 135–145)
WBC # BLD: 6.55 K/UL — SIGNIFICANT CHANGE UP (ref 3.8–10.5)
WBC # FLD AUTO: 6.55 K/UL — SIGNIFICANT CHANGE UP (ref 3.8–10.5)

## 2021-02-11 PROCEDURE — 99239 HOSP IP/OBS DSCHRG MGMT >30: CPT

## 2021-02-11 RX ORDER — ATORVASTATIN CALCIUM 80 MG/1
1 TABLET, FILM COATED ORAL
Qty: 30 | Refills: 3
Start: 2021-02-11 | End: 2021-06-10

## 2021-02-11 RX ORDER — METOPROLOL TARTRATE 50 MG
25 TABLET ORAL DAILY
Refills: 0 | Status: DISCONTINUED | OUTPATIENT
Start: 2021-02-11 | End: 2021-02-11

## 2021-02-11 RX ORDER — ACETAMINOPHEN 500 MG
650 TABLET ORAL ONCE
Refills: 0 | Status: COMPLETED | OUTPATIENT
Start: 2021-02-11 | End: 2021-02-11

## 2021-02-11 RX ORDER — METOPROLOL TARTRATE 50 MG
12.5 TABLET ORAL ONCE
Refills: 0 | Status: COMPLETED | OUTPATIENT
Start: 2021-02-11 | End: 2021-02-11

## 2021-02-11 RX ORDER — SIMVASTATIN 20 MG/1
1 TABLET, FILM COATED ORAL
Qty: 30 | Refills: 3
Start: 2021-02-11 | End: 2021-06-10

## 2021-02-11 RX ORDER — CLOPIDOGREL BISULFATE 75 MG/1
1 TABLET, FILM COATED ORAL
Qty: 30 | Refills: 11
Start: 2021-02-11 | End: 2022-02-05

## 2021-02-11 RX ORDER — ASPIRIN/CALCIUM CARB/MAGNESIUM 324 MG
1 TABLET ORAL
Qty: 30 | Refills: 11
Start: 2021-02-11 | End: 2022-02-05

## 2021-02-11 RX ORDER — METOPROLOL TARTRATE 50 MG
1 TABLET ORAL
Qty: 30 | Refills: 3
Start: 2021-02-11 | End: 2021-06-10

## 2021-02-11 RX ADMIN — CLOPIDOGREL BISULFATE 75 MILLIGRAM(S): 75 TABLET, FILM COATED ORAL at 09:46

## 2021-02-11 RX ADMIN — AMLODIPINE BESYLATE 5 MILLIGRAM(S): 2.5 TABLET ORAL at 05:09

## 2021-02-11 RX ADMIN — Medication 81 MILLIGRAM(S): at 09:46

## 2021-02-11 RX ADMIN — Medication 20 MILLIGRAM(S): at 09:46

## 2021-02-11 RX ADMIN — Medication 25 MILLIGRAM(S): at 11:55

## 2021-02-11 RX ADMIN — PANTOPRAZOLE SODIUM 40 MILLIGRAM(S): 20 TABLET, DELAYED RELEASE ORAL at 05:09

## 2021-02-11 RX ADMIN — BUPROPION HYDROCHLORIDE 150 MILLIGRAM(S): 150 TABLET, EXTENDED RELEASE ORAL at 09:46

## 2021-02-11 RX ADMIN — Medication 650 MILLIGRAM(S): at 05:09

## 2021-02-11 RX ADMIN — Medication 12.5 MILLIGRAM(S): at 00:29

## 2021-02-11 NOTE — DISCHARGE NOTE PROVIDER - NSDCCPCAREPLAN_GEN_ALL_CORE_FT
PRINCIPAL DISCHARGE DIAGNOSIS  Diagnosis: CAD (coronary artery disease)  Assessment and Plan of Treatment: You had successful percutaneous coronary intervention with drug eluding stent placement in your proximal right coronary artery in-stent-restenosis.  --Continue Aspirin 81mg by mouth daily and Plavix 75mg by mouth daily.   --DO NOT STOP TAKING ASPIRIN OR PLAVIX UNLESS INSTRUCTED BY YOUR CARDIOLOGIST, TO PREVENT STENT CLOSURE/HEART ATTACK.  *The catheter from your wrist was removed and you should remove the dressing in 24 hours.   *You may shower once the dressing is removed, but avoid baths, hot tubs, or swimming for 5 days to prevent infection.   *If you notice bleeding from the site, hardening and pain at the site, drainage or redness from the site, coolness/paleness of the extremity, swelling, or fever, please call [phone].  *All of your needed prescriptions have been sent electronically to your pharmacy.        SECONDARY DISCHARGE DIAGNOSES  Diagnosis: Hypertension  Assessment and Plan of Treatment: Continue home regimen of Norvasc 5mg by mouth daily and Lasix 20mg by mouth daily.  --You have also been started on _______    Diagnosis: Hyperlipidemia  Assessment and Plan of Treatment: Your Simvastatin has been switched to Atorvastatin 80mg by mouth at bedtime. STOP TAKING SIMVASTATIN.     PRINCIPAL DISCHARGE DIAGNOSIS  Diagnosis: CAD (coronary artery disease)  Assessment and Plan of Treatment: You had successful percutaneous coronary intervention with drug eluding stent placement in your proximal right coronary artery in-stent-restenosis.  --Continue Aspirin 81mg by mouth daily and Plavix (CLOPIDROGEL) 75mg by mouth daily.   --DO NOT STOP TAKING ASPIRIN OR PLAVIX UNLESS INSTRUCTED BY YOUR CARDIOLOGIST, TO PREVENT STENT CLOSURE/HEART ATTACK.  *The catheter from your wrist was removed and you should remove the dressing in 24 hours.   *You may shower once the dressing is removed, but avoid baths, hot tubs, or swimming for 5 days to prevent infection.   *If you notice bleeding from the site, hardening and pain at the site, drainage or redness from the site, coolness/paleness of the extremity, swelling, or fever, please call [phone].  *All of your needed prescriptions have been sent electronically to your pharmacy.        SECONDARY DISCHARGE DIAGNOSES  Diagnosis: Diabetes  Assessment and Plan of Treatment: Your Hemoglobin A1c, which measures your average blood sugar level over the last three months, is 6.7, which puts you in the DIABETIC CATEGORY.  Please follow-up with the endocrinologist provided for follow-up. Additionally, you have been educated on proper diet control for diabtes - please continue to eat a diet full of vegetables, low-starch fruits, lean meats such a lean turkey or chicken, and fish. Please follow-up with the endocrinologist, Dr. Cooper for further management.      Diagnosis: Hypertension  Assessment and Plan of Treatment: Continue home regimen of Norvasc 5mg by mouth daily and Lasix 20mg by mouth daily.  --You have also been started on Metoprolol Succinate (TOPROL XL) 25 mg once daily. Appropriate refills were sent to your preferred pharmacy.    Diagnosis: Hyperlipidemia  Assessment and Plan of Treatment: Your Simvastatin has been switched to Atorvastatin 80mg by mouth at bedtime. STOP TAKING SIMVASTATIN. Your LDL is 84 and your goal LDL is less than 70. LDL is also known as "bad cholesterol" because it takes cholesterol to your arteries, where it may collect in artery walls. Too much cholesterol in your arteries may lead to a buildup of plaque known as atherosclerosis and contribute to heart disease. Please continue:  Appropriate refills were sent to your preferred pharmacy. Please follow-up with your cardiologist for further management.       PRINCIPAL DISCHARGE DIAGNOSIS  Diagnosis: CAD (coronary artery disease)  Assessment and Plan of Treatment: You had successful percutaneous coronary intervention with drug eluding stent placement in your proximal right coronary artery in-stent-restenosis.  --Continue Aspirin 81mg by mouth daily and Plavix (CLOPIDROGEL) 75mg by mouth daily.   --DO NOT STOP TAKING ASPIRIN OR PLAVIX UNLESS INSTRUCTED BY YOUR CARDIOLOGIST, TO PREVENT STENT CLOSURE/HEART ATTACK.  *The catheter from your wrist was removed and you should remove the dressing in 24 hours.   *You may shower once the dressing is removed, but avoid baths, hot tubs, or swimming for 5 days to prevent infection.   *If you notice bleeding from the site, hardening and pain at the site, drainage or redness from the site, coolness/paleness of the extremity, swelling, or fever, please call [phone].  *All of your needed prescriptions have been sent electronically to your pharmacy.        SECONDARY DISCHARGE DIAGNOSES  Diagnosis: Diabetes  Assessment and Plan of Treatment: Your Hemoglobin A1c, which measures your average blood sugar level over the last three months, is 6.7, which puts you in the DIABETIC CATEGORY.  Please follow-up with the endocrinologist provided for follow-up. Additionally, you have been educated on proper diet control for diabtes - please continue to eat a diet full of vegetables, low-starch fruits, lean meats such a lean turkey or chicken, and fish. Please follow-up with the endocrinologist, Dr. Cooper for further management.      Diagnosis: Hypertension  Assessment and Plan of Treatment: Continue home regimen of Norvasc 5mg by mouth daily and Lasix 20mg by mouth daily. Appropriate refills were sent to your preferred pharmacy.    Diagnosis: Hyperlipidemia  Assessment and Plan of Treatment: Your Simvastatin has been switched to Atorvastatin 80mg by mouth at bedtime. STOP TAKING SIMVASTATIN. Your LDL is 84 and your goal LDL is less than 70. LDL is also known as "bad cholesterol" because it takes cholesterol to your arteries, where it may collect in artery walls. Too much cholesterol in your arteries may lead to a buildup of plaque known as atherosclerosis and contribute to heart disease. Please continue:  Appropriate refills were sent to your preferred pharmacy. Please follow-up with your cardiologist for further management.

## 2021-02-11 NOTE — CHART NOTE - NSCHARTNOTEFT_GEN_A_CORE
Patient noted to have 11 beat of NSVT 11:40PM, asymptomatic. BP: 120s/60s, HR: 80s, RR:20, O2 sat: 98% RA. Post procedure EKG, NSR@75BPM with no acute ST/T wave changes. Patient given Metoprolol Tartrate 12.5mg PO x 1 dose, will continue to monitor closely.

## 2021-02-11 NOTE — DISCHARGE NOTE PROVIDER - PROVIDER TOKENS
PROVIDER:[TOKEN:[[id number]],FOLLOWUP:[2 weeks]] PROVIDER:[TOKEN:[[id number]],FOLLOWUP:[2 weeks]],PROVIDER:[TOKEN:[[id number]],FOLLOWUP:[1 week]]

## 2021-02-11 NOTE — DISCHARGE NOTE PROVIDER - NSDCMRMEDTOKEN_GEN_ALL_CORE_FT
albuterol 90 mcg/inh inhalation aerosol: 2 puff(s) inhaled 4 times a day, As Needed  amLODIPine 5 mg oral tablet: 1 tab(s) orally once a day  aspirin 81 mg oral tablet: 1 tab(s) orally once a day  Creon: 2 tab(s) orally once a day  Creon: 1 tab(s) orally once a day (at bedtime)  Lasix 20 mg oral tablet: 1 tab(s) orally every other day  Mapap Arthritis Pain 650 mg oral tablet, extended release: 1 tab(s) orally once a day  NexIUM 40 mg oral delayed release capsule: 1 cap(s) orally once a day  simvastatin 40 mg oral tablet: 1 tab(s) orally once a day (at bedtime)  simvastatin 40 mg oral tablet: 1 tab(s) orally once a day (at bedtime)  Wellbutrin  mg/24 hours oral tablet, extended release: 1 tab(s) orally every 24 hours   albuterol 90 mcg/inh inhalation aerosol: 2 puff(s) inhaled 4 times a day, As Needed  amLODIPine 5 mg oral tablet: 1 tab(s) orally once a day  Aspirin Enteric Coated 81 mg oral delayed release tablet: 1 tab(s) orally once a day   atorvastatin 80 mg oral tablet: 1 tab(s) orally once a day (at bedtime)   clopidogrel 75 mg oral tablet: 1 tab(s) orally once a day  Creon: 2 tab(s) orally once a day  Creon: 1 tab(s) orally once a day (at bedtime)  Lasix 20 mg oral tablet: 1 tab(s) orally every other day  Mapap Arthritis Pain 650 mg oral tablet, extended release: 1 tab(s) orally once a day  NexIUM 40 mg oral delayed release capsule: 1 cap(s) orally once a day  Toprol-XL 25 mg oral tablet, extended release: 1 tab(s) orally once a day   Wellbutrin  mg/24 hours oral tablet, extended release: 1 tab(s) orally every 24 hours   albuterol 90 mcg/inh inhalation aerosol: 2 puff(s) inhaled 4 times a day, As Needed  amLODIPine 5 mg oral tablet: 1 tab(s) orally once a day  Aspirin Enteric Coated 81 mg oral delayed release tablet: 1 tab(s) orally once a day   atorvastatin 80 mg oral tablet: 1 tab(s) orally once a day (at bedtime)   clopidogrel 75 mg oral tablet: 1 tab(s) orally once a day  Creon: 2 tab(s) orally once a day  Creon: 1 tab(s) orally once a day (at bedtime)  Lasix 20 mg oral tablet: 1 tab(s) orally every other day  Mapap Arthritis Pain 650 mg oral tablet, extended release: 1 tab(s) orally once a day  NexIUM 40 mg oral delayed release capsule: 1 cap(s) orally once a day  Wellbutrin  mg/24 hours oral tablet, extended release: 1 tab(s) orally every 24 hours

## 2021-02-11 NOTE — PROVIDER CONTACT NOTE (OTHER) - ASSESSMENT
Pt's HR 85 NSR, sp02 98% on room air, /60. Pt denies chest pain, shortness of breath, palpitations. Pt states "I just felt hot"

## 2021-02-11 NOTE — DISCHARGE NOTE PROVIDER - INSTRUCTIONS
A Mediterranean Diet is recommended! Some suggestions include continue incorporating 2 or more servings per day of vegetables, fruits, and whole grains. Increase intake of fish and legumes/beans to 2 or more servings per week. Aim to increase intake of healthy fats, such as olive oil and avocados, and have a handful of nuts/seeds most days. Reduce red/processed meat consumption to 2 or fewer times per week.

## 2021-02-11 NOTE — DISCHARGE NOTE NURSING/CASE MANAGEMENT/SOCIAL WORK - PATIENT PORTAL LINK FT
You can access the FollowMyHealth Patient Portal offered by [facility] by registering at the following website: [email or web address]. By joining [facility]’s FollowMyHealth portal, you will also be able to view your health information using other applications (apps) compatible with our system.

## 2021-02-11 NOTE — DISCHARGE NOTE PROVIDER - HOSPITAL COURSE
66 y/o F former smoker FHx of early MI's (Mother 60's, Father 40's), PMHx HTN, HLD, diverticulosis/diverticulitis (last tx with abx 2/2017), PUD (non-bleeding), GERD, depression/anxiety, asthma and COPD (no hospitalizations/ intubations, no home O2), undiagnosed NATALYA, Latent Tuberculosis (s/p abx treatment in 1995), chronic diastolic CHF (EF 60% by NST 2/2017), CAD who was referred to Dr Espitia for epigastric burning and chest tightness 10/10 with palpitations during stressful/emotional periods and worsening of chronic BACA  now occurring with minimal exertion for past year. Per MD note NST 8/20/20: fixed inferior defect c/w attenuation artifact.    Patient subsequently referred to [facility] 2/10/21 s/p DEENA pRCA ISR, EF 65%, EDP 18mmHg. Patient also underwent RHC: PCWP 6, PA 24/7, RV 27/2, RA 3. Post procedure patient admitted to [facility] for monitoring.   Patient currently asymptomatic, VSS, right radial access site soft, NT, no hematoma, radial pulse 2+. Labs/telemetry reviewed. Patient now deemed stable for discharge as per Dr. Mayes and to follow-up with Dr. Espitia in 1-2 weeks.   66 y/o F former smoker FHx of early MI's (Mother 60's, Father 40's), PMHx HTN, HLD, diverticulosis/diverticulitis (last tx with abx 2/2017), PUD (non-bleeding), GERD, depression/anxiety, asthma and COPD (no hospitalizations/ intubations, no home O2), undiagnosed NATALYA, Latent Tuberculosis (s/p abx treatment in 1995), chronic diastolic CHF (EF 60% by NST 2/2017), CAD who was referred to Dr Espitia for epigastric burning and chest tightness 10/10 with palpitations during stressful/emotional periods and worsening of chronic BACA  now occurring with minimal exertion for past year. Per MD note NST 8/20/20: fixed inferior defect c/w attenuation artifact.  Patient subsequently referred to [facility] 2/10/21 s/p DEENA pRCA ISR, EF 65%, EDP 18mmHg. Patient also underwent RHC: PCWP 6, PA 24/7, RV 27/2, RA 3. Post procedure patient admitted to [facility] for monitoring. Patient currently asymptomatic, VSS, right radial access site soft, NT, no hematoma, radial pulse 2+. Labs/telemetry reviewed. Patient should follow-up with outpatient endocrinologist as patient with hemoglobin a1c of 6.7 putting her into the diabetic category. Patient also educated on importance of diet for managing diabetes by RD. Patient started on a beta-blocker as patient had 11 beats of NSVT overnight on 02/11/2021. Patient now deemed stable for discharge as per Dr. Mayes and to follow-up with Dr. Espitia in 1-2 weeks.   64 y/o F former smoker FHx of early MI's (Mother 60's, Father 40's), PMHx HTN, HLD, diverticulosis/diverticulitis (last tx with abx 2/2017), PUD (non-bleeding), GERD, depression/anxiety, asthma and COPD (no hospitalizations/ intubations, no home O2), undiagnosed NATALYA, Latent Tuberculosis (s/p abx treatment in 1995), chronic diastolic CHF (EF 60% by NST 2/2017), CAD who was referred to Dr Espitia for epigastric burning and chest tightness 10/10 with palpitations during stressful/emotional periods and worsening of chronic BACA  now occurring with minimal exertion for past year. Per MD note NST 8/20/20: fixed inferior defect c/w attenuation artifact.  Patient subsequently referred to [facility] 2/10/21 s/p DEENA pRCA ISR, EF 65%, EDP 18mmHg. Patient also underwent RHC: PCWP 6, PA 24/7, RV 27/2, RA 3. Post procedure patient admitted to [facility] for monitoring. Patient currently asymptomatic, VSS, right radial access site soft, NT, no hematoma, radial pulse 2+. Labs/telemetry reviewed. Patient should follow-up with outpatient endocrinologist as patient with hemoglobin a1c of 6.7 putting her into the diabetic category. Patient also educated on importance of diet for managing diabetes by RD. Patient started on a beta-blocker as patient had 11 beats of NSVT overnight on 02/11/2021. However, patient did not tolerate Metoprolol Succinate 25 mg as she became dizzy and nauseas. Patient now deemed stable for discharge as per Dr. Mayes and to follow-up with Dr. Espitia in 1-2 weeks.

## 2021-02-11 NOTE — DISCHARGE NOTE PROVIDER - CARE PROVIDER_API CALL
Luis Espitia  CARDIOVASCULAR DISEASE  [address] [place] [address], [place] [address]  Phone: [phone]  Fax: [phone]  Follow Up Time: 2 weeks   Luis Espitia  CARDIOVASCULAR DISEASE  [address] [place] [address], [address]  [place] [address]  Phone: [phone]  Fax: [phone]  Follow Up Time: 2 weeks    Maddy Cooper; PhD)  Internal Medicine  [address], [facility] [address]  [place] [address]  Phone: [phone]  Fax: [phone]  Follow Up Time: 1 week

## 2021-02-22 ENCOUNTER — APPOINTMENT (OUTPATIENT)
Dept: HEART AND VASCULAR | Facility: CLINIC | Age: 66
End: 2021-02-22

## 2021-02-22 DIAGNOSIS — Z95.5 PRESENCE OF CORONARY ANGIOPLASTY IMPLANT AND GRAFT: ICD-10-CM

## 2021-02-22 DIAGNOSIS — J44.9 CHRONIC OBSTRUCTIVE PULMONARY DISEASE, UNSPECIFIED: ICD-10-CM

## 2021-02-22 DIAGNOSIS — I11.0 HYPERTENSIVE HEART DISEASE WITH HEART FAILURE: ICD-10-CM

## 2021-02-22 DIAGNOSIS — I25.110 ATHEROSCLEROTIC HEART DISEASE OF NATIVE CORONARY ARTERY WITH UNSTABLE ANGINA PECTORIS: ICD-10-CM

## 2021-02-22 DIAGNOSIS — Z22.7 LATENT TUBERCULOSIS: ICD-10-CM

## 2021-02-22 DIAGNOSIS — K21.9 GASTRO-ESOPHAGEAL REFLUX DISEASE WITHOUT ESOPHAGITIS: ICD-10-CM

## 2021-02-22 DIAGNOSIS — K27.9 PEPTIC ULCER, SITE UNSPECIFIED, UNSPECIFIED AS ACUTE OR CHRONIC, WITHOUT HEMORRHAGE OR PERFORATION: ICD-10-CM

## 2021-02-22 DIAGNOSIS — I50.32 CHRONIC DIASTOLIC (CONGESTIVE) HEART FAILURE: ICD-10-CM

## 2021-02-22 DIAGNOSIS — I25.10 ATHEROSCLEROTIC HEART DISEASE OF NATIVE CORONARY ARTERY WITHOUT ANGINA PECTORIS: ICD-10-CM

## 2021-02-22 DIAGNOSIS — Z87.891 PERSONAL HISTORY OF NICOTINE DEPENDENCE: ICD-10-CM

## 2021-02-22 DIAGNOSIS — E78.5 HYPERLIPIDEMIA, UNSPECIFIED: ICD-10-CM

## 2021-02-22 DIAGNOSIS — F32.9 MAJOR DEPRESSIVE DISORDER, SINGLE EPISODE, UNSPECIFIED: ICD-10-CM

## 2021-02-22 DIAGNOSIS — F41.9 ANXIETY DISORDER, UNSPECIFIED: ICD-10-CM

## 2021-02-22 PROCEDURE — 93005 ELECTROCARDIOGRAM TRACING: CPT

## 2021-02-22 PROCEDURE — 93458 L HRT ARTERY/VENTRICLE ANGIO: CPT

## 2021-02-22 PROCEDURE — 85025 COMPLETE CBC W/AUTO DIFF WBC: CPT

## 2021-02-22 PROCEDURE — 85610 PROTHROMBIN TIME: CPT

## 2021-02-22 PROCEDURE — C1753: CPT

## 2021-02-22 PROCEDURE — 80053 COMPREHEN METABOLIC PANEL: CPT

## 2021-02-22 PROCEDURE — 85027 COMPLETE CBC AUTOMATED: CPT

## 2021-02-22 PROCEDURE — 83036 HEMOGLOBIN GLYCOSYLATED A1C: CPT

## 2021-02-22 PROCEDURE — 80061 LIPID PANEL: CPT

## 2021-02-22 PROCEDURE — C1874: CPT

## 2021-02-22 PROCEDURE — C1725: CPT

## 2021-02-22 PROCEDURE — C1894: CPT

## 2021-02-22 PROCEDURE — 85730 THROMBOPLASTIN TIME PARTIAL: CPT

## 2021-02-22 PROCEDURE — 36415 COLL VENOUS BLD VENIPUNCTURE: CPT

## 2021-02-22 PROCEDURE — 92978 ENDOLUMINL IVUS OCT C 1ST: CPT

## 2021-02-22 PROCEDURE — C9600: CPT

## 2021-02-22 PROCEDURE — C1769: CPT

## 2021-02-22 PROCEDURE — C1887: CPT

## 2021-02-22 PROCEDURE — 82550 ASSAY OF CK (CPK): CPT

## 2021-02-22 PROCEDURE — 83735 ASSAY OF MAGNESIUM: CPT

## 2021-02-22 PROCEDURE — 86803 HEPATITIS C AB TEST: CPT

## 2021-02-22 PROCEDURE — 82553 CREATINE MB FRACTION: CPT

## 2021-03-08 ENCOUNTER — APPOINTMENT (OUTPATIENT)
Dept: HEART AND VASCULAR | Facility: CLINIC | Age: 66
End: 2021-03-08
Payer: MEDICARE

## 2021-03-08 VITALS — HEART RATE: 72 BPM | DIASTOLIC BLOOD PRESSURE: 86 MMHG | SYSTOLIC BLOOD PRESSURE: 130 MMHG

## 2021-03-08 DIAGNOSIS — I25.750 ATHEROSCLEROSIS OF NATIVE CORONARY ARTERY OF TRANSPLANTED HEART WITH UNSTABLE ANGINA: ICD-10-CM

## 2021-03-08 PROCEDURE — 99214 OFFICE O/P EST MOD 30 MIN: CPT

## 2021-03-08 RX ORDER — ASPIRIN ENTERIC COATED TABLETS 81 MG 81 MG/1
81 TABLET, DELAYED RELEASE ORAL DAILY
Qty: 30 | Refills: 3 | Status: ACTIVE | COMMUNITY
Start: 2021-03-08 | End: 1900-01-01

## 2021-03-08 NOTE — PHYSICAL EXAM
[Normal Oral Mucosa] : normal oral mucosa [No Oral Pallor] : no oral pallor [No Oral Cyanosis] : no oral cyanosis [Normal Jugular Venous A Waves Present] : normal jugular venous A waves present [Normal Jugular Venous V Waves Present] : normal jugular venous V waves present [No Jugular Venous Whiting A Waves] : no jugular venous whiting A waves [Heart Rate And Rhythm] : heart rate and rhythm were normal [Heart Sounds] : normal S1 and S2 [Murmurs] : no murmurs present [Respiration, Rhythm And Depth] : normal respiratory rhythm and effort [Exaggerated Use Of Accessory Muscles For Inspiration] : no accessory muscle use [Auscultation Breath Sounds / Voice Sounds] : lungs were clear to auscultation bilaterally [Abdomen Soft] : soft [Abdomen Tenderness] : non-tender [Abdomen Mass (___ Cm)] : no abdominal mass palpated [Nail Clubbing] : no clubbing of the fingernails [Cyanosis, Localized] : no localized cyanosis [Petechial Hemorrhages (___cm)] : no petechial hemorrhages [] : no ischemic changes [FreeTextEntry1] : obese, nad

## 2021-03-08 NOTE — ASSESSMENT
[FreeTextEntry1] : 64 yo F with PMH CAD s/p PCI ( RCA 2009 and 2/ 2021) , HTN, HLD here for follow up\par \par CAD s/p PCI\par -cont with aspirin\par -cont with statin\par -cont witth amlodipine and ranexa\par -no betablocker in the setting of non tolerance post cath (? nausea)\par -recc to call the clinic immediately if onset of chest pain\par \par \par HLD\par -cont with atorvastatin 80 mg daliy \par -recheck lipid panel in 4 months \par \par \par HTN \par -well controlled \par -cont with current meds \par \par f/u in 3 month or sooner if any symptoms\par recc to f/u with PMD re: COPD and NATALYA

## 2021-03-08 NOTE — HISTORY OF PRESENT ILLNESS
[FreeTextEntry1] : 64 yo F with PMH CAD s/p PCI ( RCA 2009 and repeat cath 2017 , HTN, HLD here for follow up after \par PCI on 2/11/2021 \par The patient denies chest pain, shortness of breath, palpitations, syncope, presyncope, LE edema, orthopnea. \par Reports chronic BACA which she attributes to her COPD and NATALYA\par The patient did not tolerate betablocker after discharge from the hospital post cath 2/2 nausea\par \par \par PMH \par as above\par \par ALL\par PENICILLIN\par \par D/C meds\par amLODIPine 5 mg oral tablet: 1 tab(s) orally once a day \par Aspirin Enteric Coated 81 mg oral delayed release tablet: 1 tab(s) orally once \par a day \par atorvastatin 80 mg oral tablet: 1 tab(s) orally once a day (at bedtime) \par clopidogrel 75 mg oral tablet: 1 tab(s) orally once a day \par Creon: 2 tab(s) orally once a day \par Creon: 1 tab(s) orally once a day (at bedtime) \par Lasix 20 mg oral tablet: 1 tab(s) orally every other day \par Mapap Arthritis Pain 650 mg oral tablet, extended release: 1 tab(s) orally once \par a day \par NexIUM 40 mg oral delayed release capsule: 1 cap(s) orally once a day \par Wellbutrin  mg/24 hours oral tablet, extended release: 1 tab(s) orally \par every 24 hours\par \par SH\par former smoker\par no etoh, no drugs\par \par \par NUCLEAR STRESS TEST 8/20/2020\par fixed inferior defect c/w attenuation artifact\par LVEF 50%\par \par EKG 10/8/2020 SR, wnl\par \par EKG 2/10/2021\par SR, wnl\par \par CATH 2/10/2021\par FINDINGS \par RHC\par PCW= 6 mmHg\par PA= 24/7\par RV= 27/2\par RA= 3 \par \par LM= normal \par Lcx= moderate diffuse \par LAD= moderate diffuse \par RCA= 80% ISR\par \par LVEF:65 %\par LVEDP: 18 mmHg\par \par PROCEDURE: \par Successful PCI of pRCA ISR with DEENA x 1 ([place] 3.0 x 18) after cutting balloon ( 3.0 x 12) with excellent angiographic result with good stent apposition confirmed by IVUS. \par

## 2021-06-14 ENCOUNTER — APPOINTMENT (OUTPATIENT)
Dept: HEART AND VASCULAR | Facility: CLINIC | Age: 66
End: 2021-06-14

## 2021-08-30 ENCOUNTER — APPOINTMENT (OUTPATIENT)
Dept: HEART AND VASCULAR | Facility: CLINIC | Age: 66
End: 2021-08-30

## 2021-09-13 ENCOUNTER — APPOINTMENT (OUTPATIENT)
Dept: HEART AND VASCULAR | Facility: CLINIC | Age: 66
End: 2021-09-13
Payer: MEDICARE

## 2021-09-13 VITALS — HEART RATE: 69 BPM | SYSTOLIC BLOOD PRESSURE: 120 MMHG | DIASTOLIC BLOOD PRESSURE: 68 MMHG

## 2021-09-13 PROCEDURE — 99214 OFFICE O/P EST MOD 30 MIN: CPT

## 2021-09-13 NOTE — HISTORY OF PRESENT ILLNESS
[FreeTextEntry1] : 64 yo F with PMH CAD s/p PCI ( RCA 2009 and repeat cath 2017 , HTN, HLD here for follow up\par Today she reports that she was diagnosed with DM and started on metformin\par The patient denies chest pain, shortness of breath, palpitations, syncope, presyncope, LE edema, orthopnea. \par Reports chronic BACA which she attributes to her COPD and NATALYA. She reports she is unable to walk 2/2 genarlized fatigue\par The patient did not tolerate betablocker after discharge from the hospital post cath 2/2 nausea\par \par \par PMH \par DM\par diverticulitis \par HTN\par CAD s/p PCI\par \par ALL\par PENICILLIN\par \par D/C meds\par amLODIPine 5 mg oral tablet: 1 tab(s) orally once a day \par Aspirin Enteric Coated 81 mg oral delayed release tablet: 1 tab(s) orally once \par a day \par atorvastatin 80 mg oral tablet: 1 tab(s) orally once a day (at bedtime) \par clopidogrel 75 mg oral tablet: 1 tab(s) orally once a day \par Creon: 2 tab(s) orally once a day \par Creon: 1 tab(s) orally once a day (at bedtime) \par Lasix 20 mg oral tablet: 1 tab(s) orally every other day \par Mapap Arthritis Pain 650 mg oral tablet, extended release: 1 tab(s) orally once \par a day \par NexIUM 40 mg oral delayed release capsule: 1 cap(s) orally once a day \par Wellbutrin  mg/24 hours oral tablet, extended release: 1 tab(s) orally \par every 24 hours\par \par SH\par former smoker\par no etoh, no drugs\par \par \par NUCLEAR STRESS TEST 8/20/2020\par fixed inferior defect c/w attenuation artifact\par LVEF 50%\par \par EKG 10/8/2020 SR, wnl\par \par EKG 2/10/2021\par SR, wnl\par \par CATH 2/10/2021\par FINDINGS \par RHC\par PCW= 6 mmHg\par PA= 24/7\par RV= 27/2\par RA= 3 \par \par LM= normal \par Lcx= moderate diffuse \par LAD= moderate diffuse \par RCA= 80% ISR\par \par LVEF:65 %\par LVEDP: 18 mmHg\par \par PROCEDURE: \par Successful PCI of pRCA ISR with DEENA x 1 ([place] 3.0 x 18) after cutting balloon ( 3.0 x 12) with excellent angiographic result with good stent apposition confirmed by IVUS. \par \par EG 8/12/2021\par SR, wnl \par \par LABS 8/13/2021\par LDL 71\par trig 210\par HDL 40\par chol 155\par creat 1.2\par A1c= 10.3 \par

## 2021-09-13 NOTE — ASSESSMENT
[FreeTextEntry1] : 64 yo F with PMH CAD s/p PCI ( RCA 2009 and 2 2021) , HTN, HLD here for follow up\par \par CAD s/p PCI\par -denies exertional angina\par -cont with aspirin 81 mg daily \par -cont with plavix 75 mg daily \par -cont with atorvastatin 80 mg daily \par -cont witth amlodipine and ranexa\par -no betablocker in the setting of non tolerance post cath (? nausea)\par -recc to call the clinic immediately if onset of chest pain\par \par HLD\par -cont with atorvastatin 80 mg daliy \par LDL 71 on 8/13/2021, explained that LDL goal is <70\par -recc healthy diet and exercise\par -recheck lipid panel in 4 months \par \par HTN \par -well controlled \par -cont with current meds \par \par f/u in 4 month or sooner if any symptoms\par recc to f/u with PMD re: COPD and NATALYA

## 2021-10-14 RX ORDER — ATORVASTATIN CALCIUM 80 MG/1
80 TABLET, FILM COATED ORAL
Qty: 1 | Refills: 1 | Status: ACTIVE | COMMUNITY
Start: 2021-03-08 | End: 1900-01-01

## 2022-02-02 RX ORDER — CLOPIDOGREL BISULFATE 75 MG/1
75 TABLET, FILM COATED ORAL DAILY
Qty: 30 | Refills: 3 | Status: ACTIVE | COMMUNITY
Start: 2021-03-08 | End: 1900-01-01

## 2022-02-28 ENCOUNTER — APPOINTMENT (OUTPATIENT)
Dept: HEART AND VASCULAR | Facility: CLINIC | Age: 67
End: 2022-02-28

## 2022-04-13 ENCOUNTER — APPOINTMENT (OUTPATIENT)
Dept: HEART AND VASCULAR | Facility: CLINIC | Age: 67
End: 2022-04-13

## 2022-04-13 PROCEDURE — 99212 OFFICE O/P EST SF 10 MIN: CPT

## 2022-04-20 ENCOUNTER — APPOINTMENT (OUTPATIENT)
Dept: HEART AND VASCULAR | Facility: CLINIC | Age: 67
End: 2022-04-20
Payer: MEDICARE

## 2022-04-20 PROCEDURE — ZZZZZ: CPT

## 2022-05-04 ENCOUNTER — APPOINTMENT (OUTPATIENT)
Dept: HEART AND VASCULAR | Facility: CLINIC | Age: 67
End: 2022-05-04

## 2022-05-04 PROCEDURE — 99211 OFF/OP EST MAY X REQ PHY/QHP: CPT

## 2022-07-20 NOTE — HISTORY OF PRESENT ILLNESS
[FreeTextEntry1] : 64 yo F with PMH CAD s/p PCI  COPD, HTN, HLD, DM here for follow up. Denies chest pain, shortness of breath, palpitations, syncope, presyncope, LE edema, orthopnea. Chronic BACA which she attributes to her COPD and NATALYA. Generalized fatigue. \par

## 2022-07-20 NOTE — DISCUSSION/SUMMARY
[FreeTextEntry1] : 66 yo F with PMH CAD s/p PCI COPD, HTN, HLD, DM here for follow up. Denies chest pain, shortness of breath, palpitations, syncope, presyncope, LE edema, orthopnea. Chronic BACA which she attributes to her COPD and NATALYA. Now reporting that fatigue improved.\par \par Cont on current med regimen\par F/u in clinic w/in 3 months or sooner if needed\par f/u with PMD/Pulm for copd/natalya

## 2022-07-20 NOTE — DISCUSSION/SUMMARY
[FreeTextEntry1] : 65F with sig cardiopulmonary hx here for follow up.\par \par --Fatigue and BACA likely from chronic COPD which needs to be addressed\par --Will cont on current meds \par --f/u in clinic in a week to reasses after seeing her pmd for further copd evaluation\par

## 2022-07-20 NOTE — PHYSICAL EXAM
[Frail] : frail [Normal Conjunctiva] : normal conjunctiva [Normal Venous Pressure] : normal venous pressure [Normal S1, S2] : normal S1, S2 [No Murmur] : no murmur [No Rub] : no rub [No Gallop] : no gallop [Clear Lung Fields] : clear lung fields [Good Air Entry] : good air entry [Soft] : abdomen soft [Non Tender] : non-tender [No Edema] : no edema

## 2022-07-20 NOTE — HISTORY OF PRESENT ILLNESS
[FreeTextEntry1] : 64 yo F with PMH CAD s/p PCI COPD, HTN, HLD, DM here for follow up. Denies chest pain, shortness of breath, palpitations, syncope, presyncope, LE edema, orthopnea. Chronic BACA which she attributes to her COPD and NATALYA. Generalized fatigue now improved.

## 2022-07-20 NOTE — PHYSICAL EXAM
[Frail] : frail [Normal] : soft, non-tender, no masses/organomegaly, normal bowel sounds [No Edema] : no edema

## 2022-08-10 NOTE — PN
Progress Note (short form)





- Note


Progress Note: 


PT states that she isn't passing any flatus/BM. NGT repositioned last pm. No 

nausea.





 Vital Signs











 Period  Temp  Pulse  Resp  BP Sys/Goodwin  Pulse Ox


 


 Last 24 Hr  98.4 F-99.3 F  68-85  20-20  139-159/60-86  98





NGT-675ml bilious





GEN:Alert and appears comfortable


CV:RRR


Lungs: CTA b/l


ABD: soft, non-distended, inc tenderness. Inc c/d/i.


LE: no calf tenderness or swelling noted b/l





 CBC, BMP





 08/02/17 06:30 





 08/02/17 06:30 





A/P: 62 yo female s/p robotic sigmoid colon resection with diagnostic lap for 

PBSO with lysis of adhesion


   No bowel function, AXR ordered for today


   cont NGT to LWS


   oob to chair


   DVT ppx with heparin SQ


   IV tylenol for pain/hold narcotics


   D/w Dr. Rowe Referring Physician (Optional): Dorothy Patterson PA-C

## 2022-08-31 ENCOUNTER — APPOINTMENT (OUTPATIENT)
Dept: HEART AND VASCULAR | Facility: CLINIC | Age: 67
End: 2022-08-31
Payer: MEDICARE

## 2022-08-31 PROCEDURE — ZZZZZ: CPT

## 2023-02-22 ENCOUNTER — APPOINTMENT (OUTPATIENT)
Dept: HEART AND VASCULAR | Facility: CLINIC | Age: 68
End: 2023-02-22
Payer: MEDICARE

## 2023-02-22 PROCEDURE — 99214 OFFICE O/P EST MOD 30 MIN: CPT

## 2023-02-22 PROCEDURE — 93000 ELECTROCARDIOGRAM COMPLETE: CPT

## 2023-02-23 NOTE — HISTORY OF PRESENT ILLNESS
[FreeTextEntry1] : 68 yo F with PMH CAD s/p PCI (2 layers pRCA) , HTN, HLD here to reestablish case. Today she reports that she has had squeezing chest pain with activity over last few months, increasing in intensity, at worst 5/10 in intensity, relieved by rest, attributed by shortness of breath more so than her usual BACA from COPD. Denies palpitations, syncope, presyncope, LE edema, orthopnea. Has been on metformin for DM and has last 5 lbs. The patient has not tolerated betablockers in the past due to worsening sob when taking metoprolol.\par \par Meds: \par \par ASA 81mg \par Furosemide 20mg qOD\par Amlodipine 5 mg daily\par Metformin\par Nexium\par Atorva 80mg\par \par ECG: NSR\par \par Last TTE 8/22: Normal biventricular function\par \par \par

## 2023-02-23 NOTE — PHYSICAL EXAM
[Well Developed] : well developed [Well Nourished] : well nourished [No Acute Distress] : no acute distress [Normal Conjunctiva] : normal conjunctiva [Normal Venous Pressure] : normal venous pressure [No Carotid Bruit] : no carotid bruit [Normal S1, S2] : normal S1, S2 [No Murmur] : no murmur [Clear Lung Fields] : clear lung fields [Good Air Entry] : good air entry [No Respiratory Distress] : no respiratory distress  [Soft] : abdomen soft [Non Tender] : non-tender [Normal Bowel Sounds] : normal bowel sounds [Normal Gait] : normal gait [No Edema] : no edema [No Cyanosis] : no cyanosis [No Rash] : no rash [No Skin Lesions] : no skin lesions [Moves all extremities] : moves all extremities [No Focal Deficits] : no focal deficits [Normal Speech] : normal speech [Alert and Oriented] : alert and oriented [Normal memory] : normal memory

## 2023-02-23 NOTE — DISCUSSION/SUMMARY
[FreeTextEntry1] : 67F hx of cad s/p PCI now with symptoms concerning for angina\par \par Angina Pectoris: Cont on ASA, atorva 80; CCTA to rule out ischemia\par Pt aware to call or go to nearest ER if symptoms worsen\par Doesnt like to be on too many medications. Wants to hold off on starting an antianginal like Imdur although strongly advised\par \par HTN: BP within acceptable range. Cont on Amlodipine; Lasix qOD\par \par HPL: Lipids within acceptable range. Cont on Atorvastatin\par \par RTC after CCTA\par

## 2023-03-09 ENCOUNTER — APPOINTMENT (OUTPATIENT)
Dept: CT IMAGING | Facility: CLINIC | Age: 68
End: 2023-03-09

## 2023-03-21 ENCOUNTER — RESULT REVIEW (OUTPATIENT)
Age: 68
End: 2023-03-21

## 2023-04-05 ENCOUNTER — APPOINTMENT (OUTPATIENT)
Dept: HEART AND VASCULAR | Facility: CLINIC | Age: 68
End: 2023-04-05
Payer: MEDICARE

## 2023-04-05 VITALS — DIASTOLIC BLOOD PRESSURE: 68 MMHG | SYSTOLIC BLOOD PRESSURE: 129 MMHG | HEART RATE: 79 BPM

## 2023-04-05 PROCEDURE — 93000 ELECTROCARDIOGRAM COMPLETE: CPT

## 2023-04-05 PROCEDURE — 99213 OFFICE O/P EST LOW 20 MIN: CPT

## 2023-04-05 NOTE — HISTORY OF PRESENT ILLNESS
[FreeTextEntry1] : 68 yo F with PMH CAD s/p PCI (2 layers pRCA) , HTN, HLD here for f/u. Today she reports that she continues to have same chest pain with activity over last few months, increasing in intensity, at worst 5/10 in intensity, relieved by rest, attributed by shortness of breath more so than her usual BACA from COPD. Denies palpitations, syncope, presyncope, LE edema, orthopnea. Has been on metformin for DM. The patient has not tolerated betablockers in the past due to worsening sob when taking metoprolol.\par \par Meds: \par \par ASA 81mg \par Plavix 75mg\par Furosemide 20mg qOD\par Amlodipine 5 mg daily\par Metformin\par Nexium\par Atorva 80mg\par \par ECG: NSR\par \par Last TTE 8/22: Normal biventricular function\par \par \par

## 2023-04-05 NOTE — DISCUSSION/SUMMARY
[FreeTextEntry1] : 67F hx of cad s/p PCI now with symptoms concerning for angina\par \par Angina Pectoris: Cont on ASA, Plavix, atorva 80; CCTA positive for ischemia. In light of persistent symptoms will pursue a coronary angiogram.\par \par Pt aware to call or go to nearest ER if symptoms worsen. Bblockers have caused sob before\par \par HTN: BP within acceptable range. Cont on Amlodipine; Lasix qOD\par \par HPL: Lipids within acceptable range. Cont on Atorvastatin\par \par Will schedule for cardiac catheterization

## 2023-04-11 VITALS
DIASTOLIC BLOOD PRESSURE: 86 MMHG | OXYGEN SATURATION: 98 % | TEMPERATURE: 98 F | RESPIRATION RATE: 16 BRPM | SYSTOLIC BLOOD PRESSURE: 136 MMHG | HEART RATE: 80 BPM

## 2023-04-11 RX ORDER — CHLORHEXIDINE GLUCONATE 213 G/1000ML
1 SOLUTION TOPICAL ONCE
Refills: 0 | Status: DISCONTINUED | OUTPATIENT
Start: 2023-04-14 | End: 2023-04-15

## 2023-04-11 NOTE — H&P ADULT - NSHPLABSRESULTS_GEN_ALL_CORE
11.5   8.66  )-----------( 239      ( 14 Apr 2023 12:41 )             36.3   04-14    139  |  101  |  27<H>  ----------------------------<  140<H>  4.4   |  25  |  0.87    Ca    9.1      14 Apr 2023 12:41  Mg     1.6     04-14    TPro  7.0  /  Alb  4.5  /  TBili  0.4  /  DBili  x   /  AST  24  /  ALT  22  /  AlkPhos  96  04-14    PT/INR - ( 14 Apr 2023 12:41 )   PT: 11.7 sec;   INR: 0.98          PTT - ( 14 Apr 2023 12:41 )  PTT:31.0 sec    CARDIAC MARKERS ( 14 Apr 2023 12:41 )  x     / x     / 124 U/L / x     / 3.2 ng/mL    EKG NSR

## 2023-04-11 NOTE — H&P ADULT - HISTORY OF PRESENT ILLNESS
COVID:  Cardiologist: Dr. Su  Pharmacy:  Escort:     67-year-old female patient with PMHx of CAD s/p PCI (2 layers pRCA), HTN, HLD, DM, COPD (_____not/on home O2, +/- recent hospitalizations/intubations_____), presented to cardiologist Dr. Su's office reporting chest pain with exertion over the last few months, 5/10 in intensity, alleviated by rest, associated with more SOB than usual as compared to her baseline BACA from COPD. Patient denies: palpitations, dizziness, syncope, diaphoresis, fatigue, LE edema, orthopnea, PND, N/V/D, abd pain, cough, congestion, fever, chills or recent sick contacts.     In light of patient's risk factors, CCS Class III anginal symptoms, and positive CCTA findings, patient is referred to [facility] for cardiac catheterization with possible intervention if clinically indicated.     Diagnostic Testing:   TTE (August 2022): normal biventricular function (per MD report)   CCTA (3/21/2023): < 25% stenosis of LMA, pRCA stent probably patent though limited by motion artifact, dense calcification of pLAD precludes assessment of stenosis, moderate stenosis of mid-RCA, trace AV calcification, 1.7 x 1.0 x 0.9 cm soft tissue nodule in anterior segment of right upper lobe.    COVID: Negative   Cardiologist: Dr. Su  Pharmacy: [facility] [phone]  Escort: Does not have but has medical ride service     67-year-old female patient with PMHx of CAD s/p DEENA to dRCA, HTN, HLD, DM, COPD,  presented to cardiologist Dr. Su's office reporting chest pain with exertion over the last few months, 5/10 in intensity, alleviated by rest, associated with more SOB than usual as compared to her baseline BACA from COPD. Patient denies: palpitations, dizziness, syncope, diaphoresis, fatigue, LE edema, orthopnea, PND, N/V/D, abd pain, cough, congestion, fever, chills or recent sick contacts.     In light of patient's risk factors, CCS Class III anginal symptoms, and positive CCTA findings, patient is referred to [facility] for cardiac catheterization with possible intervention if clinically indicated.     Diagnostic Testing:   [facility] 2/10/21 s/p DEENA pRCA ISR, EF 65%, EDP 18mmHg. Patient also underwent RHC: PCWP 6, PA 24/7, RV 27/2, RA 3.   TTE (August 2022): normal biventricular function (per MD report)   CCTA (3/21/2023): < 25% stenosis of LMA, pRCA stent probably patent though limited by motion artifact, dense calcification of pLAD precludes assessment of stenosis, moderate stenosis of mid-RCA, trace AV calcification, 1.7 x 1.0 x 0.9 cm soft tissue nodule in anterior segment of right upper lobe.

## 2023-04-11 NOTE — H&P ADULT - ASSESSMENT
67-year-old female patient with PMHx of CAD s/p DEENA to dRCA, HTN, HLD, DM, COPD,  presented to cardiologist Dr. Su's office reporting chest pain with exertion over the last few months, 5/10 in intensity, alleviated by rest, associated with more SOB than usual as compared to her baseline BACA from COPD. Patient denies: palpitations, dizziness, syncope, diaphoresis, fatigue, LE edema, orthopnea, PND, N/V/D, abd pain, cough, congestion, fever, chills or recent sick contacts.     In light of patient's risk factors, CCS Class III anginal symptoms, and positive CCTA findings, patient is referred to [facility] for cardiac catheterization with possible intervention if clinically indicated.       Pt H+H, platelets stable, Pt without any reports of BRBPR, hematuria, prior ICH, melena and no recent or previous GI bleed.   Loaded patient took home plavix 75mg and aspirin 81 and reports compliance   Pt Cr.87 and LVEF 65%, pre cath fluids ordered per protocol [X]250ml IV bolus over 30min,  50 cc x 2 hours   ASA 2  Pt is a Candidate for Moderate Sedation     Risks & benefits of procedure and alternative therapy have been explained to the patient including but not limited to: allergic reaction, bleeding w/possible need for blood transfusion, infection, renal and vascular compromise, limb damage, arrhythmia, stroke, vessel dissection/perforation, Myocardial infarction, emergent CABG. Informed consent obtained and in chart.       Suitable for moderate sedation.

## 2023-04-14 ENCOUNTER — INPATIENT (INPATIENT)
Facility: HOSPITAL | Age: 68
LOS: 0 days | Discharge: HOME | DRG: 247 | End: 2023-04-15
Attending: INTERNAL MEDICINE | Admitting: INTERNAL MEDICINE
Payer: COMMERCIAL

## 2023-04-14 DIAGNOSIS — Z87.19 PERSONAL HISTORY OF OTHER DISEASES OF THE DIGESTIVE SYSTEM: Chronic | ICD-10-CM

## 2023-04-14 DIAGNOSIS — Z98.891 HISTORY OF UTERINE SCAR FROM PREVIOUS SURGERY: Chronic | ICD-10-CM

## 2023-04-14 LAB
A1C WITH ESTIMATED AVERAGE GLUCOSE RESULT: 6.6 % — HIGH (ref 4–5.6)
ALBUMIN SERPL ELPH-MCNC: 4.5 G/DL — SIGNIFICANT CHANGE UP (ref 3.3–5)
ALP SERPL-CCNC: 96 U/L — SIGNIFICANT CHANGE UP (ref 40–120)
ALT FLD-CCNC: 22 U/L — SIGNIFICANT CHANGE UP (ref 10–45)
ANION GAP SERPL CALC-SCNC: 13 MMOL/L — SIGNIFICANT CHANGE UP (ref 5–17)
APTT BLD: 31 SEC — SIGNIFICANT CHANGE UP (ref 27.5–35.5)
AST SERPL-CCNC: 24 U/L — SIGNIFICANT CHANGE UP (ref 10–40)
BASOPHILS # BLD AUTO: 0.03 K/UL — SIGNIFICANT CHANGE UP (ref 0–0.2)
BASOPHILS NFR BLD AUTO: 0.3 % — SIGNIFICANT CHANGE UP (ref 0–2)
BILIRUB SERPL-MCNC: 0.4 MG/DL — SIGNIFICANT CHANGE UP (ref 0.2–1.2)
BUN SERPL-MCNC: 27 MG/DL — HIGH (ref 7–23)
CALCIUM SERPL-MCNC: 9.1 MG/DL — SIGNIFICANT CHANGE UP (ref 8.4–10.5)
CHLORIDE SERPL-SCNC: 101 MMOL/L — SIGNIFICANT CHANGE UP (ref 96–108)
CHOLEST SERPL-MCNC: 157 MG/DL — SIGNIFICANT CHANGE UP
CK MB CFR SERPL CALC: 3.2 NG/ML — SIGNIFICANT CHANGE UP (ref 0–6.7)
CK SERPL-CCNC: 124 U/L — SIGNIFICANT CHANGE UP (ref 25–170)
CO2 SERPL-SCNC: 25 MMOL/L — SIGNIFICANT CHANGE UP (ref 22–31)
CREAT SERPL-MCNC: 0.87 MG/DL — SIGNIFICANT CHANGE UP (ref 0.5–1.3)
EGFR: 73 ML/MIN/1.73M2 — SIGNIFICANT CHANGE UP
EOSINOPHIL # BLD AUTO: 0.12 K/UL — SIGNIFICANT CHANGE UP (ref 0–0.5)
EOSINOPHIL NFR BLD AUTO: 1.4 % — SIGNIFICANT CHANGE UP (ref 0–6)
ESTIMATED AVERAGE GLUCOSE: 143 MG/DL — HIGH (ref 68–114)
GLUCOSE BLDC GLUCOMTR-MCNC: 115 MG/DL — HIGH (ref 70–99)
GLUCOSE BLDC GLUCOMTR-MCNC: 128 MG/DL — HIGH (ref 70–99)
GLUCOSE BLDC GLUCOMTR-MCNC: 89 MG/DL — SIGNIFICANT CHANGE UP (ref 70–99)
GLUCOSE SERPL-MCNC: 140 MG/DL — HIGH (ref 70–99)
HCT VFR BLD CALC: 36.3 % — SIGNIFICANT CHANGE UP (ref 34.5–45)
HDLC SERPL-MCNC: 55 MG/DL — SIGNIFICANT CHANGE UP
HGB BLD-MCNC: 11.5 G/DL — SIGNIFICANT CHANGE UP (ref 11.5–15.5)
IMM GRANULOCYTES NFR BLD AUTO: 0.3 % — SIGNIFICANT CHANGE UP (ref 0–0.9)
INR BLD: 0.98 — SIGNIFICANT CHANGE UP (ref 0.88–1.16)
ISTAT ACTK (ACTIVATED CLOTTING TIME KAOLIN): 353 SEC — HIGH (ref 74–137)
LIPID PNL WITH DIRECT LDL SERPL: 86 MG/DL — SIGNIFICANT CHANGE UP
LYMPHOCYTES # BLD AUTO: 2.09 K/UL — SIGNIFICANT CHANGE UP (ref 1–3.3)
LYMPHOCYTES # BLD AUTO: 24.1 % — SIGNIFICANT CHANGE UP (ref 13–44)
MAGNESIUM SERPL-MCNC: 1.6 MG/DL — SIGNIFICANT CHANGE UP (ref 1.6–2.6)
MCHC RBC-ENTMCNC: 26.8 PG — LOW (ref 27–34)
MCHC RBC-ENTMCNC: 31.7 GM/DL — LOW (ref 32–36)
MCV RBC AUTO: 84.6 FL — SIGNIFICANT CHANGE UP (ref 80–100)
MONOCYTES # BLD AUTO: 0.71 K/UL — SIGNIFICANT CHANGE UP (ref 0–0.9)
MONOCYTES NFR BLD AUTO: 8.2 % — SIGNIFICANT CHANGE UP (ref 2–14)
NEUTROPHILS # BLD AUTO: 5.68 K/UL — SIGNIFICANT CHANGE UP (ref 1.8–7.4)
NEUTROPHILS NFR BLD AUTO: 65.7 % — SIGNIFICANT CHANGE UP (ref 43–77)
NON HDL CHOLESTEROL: 102 MG/DL — SIGNIFICANT CHANGE UP
NRBC # BLD: 0 /100 WBCS — SIGNIFICANT CHANGE UP (ref 0–0)
PLATELET # BLD AUTO: 239 K/UL — SIGNIFICANT CHANGE UP (ref 150–400)
POCT ISTAT CREATININE: 0.8 MG/DL — SIGNIFICANT CHANGE UP (ref 0.5–1.3)
POTASSIUM SERPL-MCNC: 4.4 MMOL/L — SIGNIFICANT CHANGE UP (ref 3.5–5.3)
POTASSIUM SERPL-SCNC: 4.4 MMOL/L — SIGNIFICANT CHANGE UP (ref 3.5–5.3)
PROT SERPL-MCNC: 7 G/DL — SIGNIFICANT CHANGE UP (ref 6–8.3)
PROTHROM AB SERPL-ACNC: 11.7 SEC — SIGNIFICANT CHANGE UP (ref 10.5–13.4)
RBC # BLD: 4.29 M/UL — SIGNIFICANT CHANGE UP (ref 3.8–5.2)
RBC # FLD: 14.8 % — HIGH (ref 10.3–14.5)
SODIUM SERPL-SCNC: 139 MMOL/L — SIGNIFICANT CHANGE UP (ref 135–145)
TRIGL SERPL-MCNC: 82 MG/DL — SIGNIFICANT CHANGE UP
WBC # BLD: 8.66 K/UL — SIGNIFICANT CHANGE UP (ref 3.8–10.5)
WBC # FLD AUTO: 8.66 K/UL — SIGNIFICANT CHANGE UP (ref 3.8–10.5)

## 2023-04-14 PROCEDURE — 92928 PRQ TCAT PLMT NTRAC ST 1 LES: CPT | Mod: RC

## 2023-04-14 PROCEDURE — 99152 MOD SED SAME PHYS/QHP 5/>YRS: CPT

## 2023-04-14 PROCEDURE — 93458 L HRT ARTERY/VENTRICLE ANGIO: CPT | Mod: 26,59

## 2023-04-14 RX ORDER — FUROSEMIDE 40 MG
20 TABLET ORAL DAILY
Refills: 0 | Status: DISCONTINUED | OUTPATIENT
Start: 2023-04-15 | End: 2023-04-15

## 2023-04-14 RX ORDER — AMLODIPINE BESYLATE 2.5 MG/1
5 TABLET ORAL ONCE
Refills: 0 | Status: COMPLETED | OUTPATIENT
Start: 2023-04-14 | End: 2023-04-14

## 2023-04-14 RX ORDER — SODIUM CHLORIDE 9 MG/ML
250 INJECTION INTRAMUSCULAR; INTRAVENOUS; SUBCUTANEOUS ONCE
Refills: 0 | Status: COMPLETED | OUTPATIENT
Start: 2023-04-14 | End: 2023-04-14

## 2023-04-14 RX ORDER — BUPROPION HYDROCHLORIDE 150 MG/1
150 TABLET, EXTENDED RELEASE ORAL DAILY
Refills: 0 | Status: DISCONTINUED | OUTPATIENT
Start: 2023-04-15 | End: 2023-04-15

## 2023-04-14 RX ORDER — SODIUM CHLORIDE 9 MG/ML
500 INJECTION INTRAMUSCULAR; INTRAVENOUS; SUBCUTANEOUS
Refills: 0 | Status: DISCONTINUED | OUTPATIENT
Start: 2023-04-14 | End: 2023-04-15

## 2023-04-14 RX ORDER — SODIUM CHLORIDE 9 MG/ML
1000 INJECTION, SOLUTION INTRAVENOUS
Refills: 0 | Status: DISCONTINUED | OUTPATIENT
Start: 2023-04-14 | End: 2023-04-15

## 2023-04-14 RX ORDER — INSULIN LISPRO 100/ML
VIAL (ML) SUBCUTANEOUS
Refills: 0 | Status: DISCONTINUED | OUTPATIENT
Start: 2023-04-14 | End: 2023-04-15

## 2023-04-14 RX ORDER — ATORVASTATIN CALCIUM 80 MG/1
80 TABLET, FILM COATED ORAL AT BEDTIME
Refills: 0 | Status: DISCONTINUED | OUTPATIENT
Start: 2023-04-14 | End: 2023-04-15

## 2023-04-14 RX ORDER — GLUCAGON INJECTION, SOLUTION 0.5 MG/.1ML
1 INJECTION, SOLUTION SUBCUTANEOUS ONCE
Refills: 0 | Status: DISCONTINUED | OUTPATIENT
Start: 2023-04-14 | End: 2023-04-15

## 2023-04-14 RX ORDER — INSULIN LISPRO 100/ML
VIAL (ML) SUBCUTANEOUS AT BEDTIME
Refills: 0 | Status: DISCONTINUED | OUTPATIENT
Start: 2023-04-14 | End: 2023-04-15

## 2023-04-14 RX ORDER — LIDOCAINE 4 G/100G
1 CREAM TOPICAL ONCE
Refills: 0 | Status: COMPLETED | OUTPATIENT
Start: 2023-04-14 | End: 2023-04-14

## 2023-04-14 RX ORDER — ASPIRIN/CALCIUM CARB/MAGNESIUM 324 MG
81 TABLET ORAL DAILY
Refills: 0 | Status: DISCONTINUED | OUTPATIENT
Start: 2023-04-15 | End: 2023-04-15

## 2023-04-14 RX ORDER — DEXTROSE 50 % IN WATER 50 %
25 SYRINGE (ML) INTRAVENOUS ONCE
Refills: 0 | Status: DISCONTINUED | OUTPATIENT
Start: 2023-04-14 | End: 2023-04-15

## 2023-04-14 RX ORDER — SODIUM CHLORIDE 9 MG/ML
1000 INJECTION INTRAMUSCULAR; INTRAVENOUS; SUBCUTANEOUS
Refills: 0 | Status: DISCONTINUED | OUTPATIENT
Start: 2023-04-14 | End: 2023-04-15

## 2023-04-14 RX ORDER — LANOLIN ALCOHOL/MO/W.PET/CERES
5 CREAM (GRAM) TOPICAL ONCE
Refills: 0 | Status: COMPLETED | OUTPATIENT
Start: 2023-04-14 | End: 2023-04-14

## 2023-04-14 RX ORDER — DEXTROSE 50 % IN WATER 50 %
15 SYRINGE (ML) INTRAVENOUS ONCE
Refills: 0 | Status: DISCONTINUED | OUTPATIENT
Start: 2023-04-14 | End: 2023-04-15

## 2023-04-14 RX ORDER — AMLODIPINE BESYLATE 2.5 MG/1
5 TABLET ORAL DAILY
Refills: 0 | Status: DISCONTINUED | OUTPATIENT
Start: 2023-04-15 | End: 2023-04-15

## 2023-04-14 RX ORDER — DEXTROSE 50 % IN WATER 50 %
12.5 SYRINGE (ML) INTRAVENOUS ONCE
Refills: 0 | Status: DISCONTINUED | OUTPATIENT
Start: 2023-04-14 | End: 2023-04-15

## 2023-04-14 RX ORDER — ALBUTEROL 90 UG/1
2 AEROSOL, METERED ORAL EVERY 6 HOURS
Refills: 0 | Status: DISCONTINUED | OUTPATIENT
Start: 2023-04-14 | End: 2023-04-15

## 2023-04-14 RX ORDER — LIPASE/PROTEASE/AMYLASE 16-48-48K
2 CAPSULE,DELAYED RELEASE (ENTERIC COATED) ORAL
Refills: 0 | Status: DISCONTINUED | OUTPATIENT
Start: 2023-04-15 | End: 2023-04-15

## 2023-04-14 RX ORDER — PANTOPRAZOLE SODIUM 20 MG/1
40 TABLET, DELAYED RELEASE ORAL
Refills: 0 | Status: DISCONTINUED | OUTPATIENT
Start: 2023-04-15 | End: 2023-04-15

## 2023-04-14 RX ORDER — CLOPIDOGREL BISULFATE 75 MG/1
75 TABLET, FILM COATED ORAL DAILY
Refills: 0 | Status: DISCONTINUED | OUTPATIENT
Start: 2023-04-15 | End: 2023-04-15

## 2023-04-14 RX ORDER — LIDOCAINE 4 G/100G
1 CREAM TOPICAL ONCE
Refills: 0 | Status: DISCONTINUED | OUTPATIENT
Start: 2023-04-14 | End: 2023-04-15

## 2023-04-14 RX ORDER — ACETAMINOPHEN 500 MG
650 TABLET ORAL EVERY 6 HOURS
Refills: 0 | Status: DISCONTINUED | OUTPATIENT
Start: 2023-04-14 | End: 2023-04-15

## 2023-04-14 RX ADMIN — Medication 650 MILLIGRAM(S): at 21:52

## 2023-04-14 RX ADMIN — AMLODIPINE BESYLATE 5 MILLIGRAM(S): 2.5 TABLET ORAL at 20:36

## 2023-04-14 RX ADMIN — Medication 5 MILLIGRAM(S): at 23:33

## 2023-04-14 RX ADMIN — SODIUM CHLORIDE 180 MILLILITER(S): 9 INJECTION INTRAMUSCULAR; INTRAVENOUS; SUBCUTANEOUS at 16:30

## 2023-04-14 RX ADMIN — SODIUM CHLORIDE 500 MILLILITER(S): 9 INJECTION INTRAMUSCULAR; INTRAVENOUS; SUBCUTANEOUS at 14:15

## 2023-04-14 RX ADMIN — LIDOCAINE 1 PATCH: 4 CREAM TOPICAL at 23:33

## 2023-04-14 RX ADMIN — ATORVASTATIN CALCIUM 80 MILLIGRAM(S): 80 TABLET, FILM COATED ORAL at 21:52

## 2023-04-14 RX ADMIN — Medication 650 MILLIGRAM(S): at 22:52

## 2023-04-15 ENCOUNTER — TRANSCRIPTION ENCOUNTER (OUTPATIENT)
Age: 68
End: 2023-04-15

## 2023-04-15 VITALS — TEMPERATURE: 97 F

## 2023-04-15 LAB
ALBUMIN SERPL ELPH-MCNC: 3.8 G/DL — SIGNIFICANT CHANGE UP (ref 3.3–5)
ALP SERPL-CCNC: 91 U/L — SIGNIFICANT CHANGE UP (ref 40–120)
ALT FLD-CCNC: 19 U/L — SIGNIFICANT CHANGE UP (ref 10–45)
ANION GAP SERPL CALC-SCNC: 10 MMOL/L — SIGNIFICANT CHANGE UP (ref 5–17)
AST SERPL-CCNC: 21 U/L — SIGNIFICANT CHANGE UP (ref 10–40)
BASOPHILS # BLD AUTO: 0.03 K/UL — SIGNIFICANT CHANGE UP (ref 0–0.2)
BASOPHILS NFR BLD AUTO: 0.5 % — SIGNIFICANT CHANGE UP (ref 0–2)
BILIRUB SERPL-MCNC: 0.3 MG/DL — SIGNIFICANT CHANGE UP (ref 0.2–1.2)
BUN SERPL-MCNC: 20 MG/DL — SIGNIFICANT CHANGE UP (ref 7–23)
CALCIUM SERPL-MCNC: 8.7 MG/DL — SIGNIFICANT CHANGE UP (ref 8.4–10.5)
CHLORIDE SERPL-SCNC: 103 MMOL/L — SIGNIFICANT CHANGE UP (ref 96–108)
CO2 SERPL-SCNC: 27 MMOL/L — SIGNIFICANT CHANGE UP (ref 22–31)
CREAT SERPL-MCNC: 0.88 MG/DL — SIGNIFICANT CHANGE UP (ref 0.5–1.3)
EGFR: 72 ML/MIN/1.73M2 — SIGNIFICANT CHANGE UP
EOSINOPHIL # BLD AUTO: 0.16 K/UL — SIGNIFICANT CHANGE UP (ref 0–0.5)
EOSINOPHIL NFR BLD AUTO: 2.6 % — SIGNIFICANT CHANGE UP (ref 0–6)
GLUCOSE BLDC GLUCOMTR-MCNC: 101 MG/DL — HIGH (ref 70–99)
GLUCOSE BLDC GLUCOMTR-MCNC: 170 MG/DL — HIGH (ref 70–99)
GLUCOSE SERPL-MCNC: 168 MG/DL — HIGH (ref 70–99)
HCT VFR BLD CALC: 34.3 % — LOW (ref 34.5–45)
HGB BLD-MCNC: 10.9 G/DL — LOW (ref 11.5–15.5)
IMM GRANULOCYTES NFR BLD AUTO: 0.2 % — SIGNIFICANT CHANGE UP (ref 0–0.9)
LYMPHOCYTES # BLD AUTO: 1.46 K/UL — SIGNIFICANT CHANGE UP (ref 1–3.3)
LYMPHOCYTES # BLD AUTO: 24.1 % — SIGNIFICANT CHANGE UP (ref 13–44)
MAGNESIUM SERPL-MCNC: 1.9 MG/DL — SIGNIFICANT CHANGE UP (ref 1.6–2.6)
MCHC RBC-ENTMCNC: 27.2 PG — SIGNIFICANT CHANGE UP (ref 27–34)
MCHC RBC-ENTMCNC: 31.8 GM/DL — LOW (ref 32–36)
MCV RBC AUTO: 85.5 FL — SIGNIFICANT CHANGE UP (ref 80–100)
MONOCYTES # BLD AUTO: 0.55 K/UL — SIGNIFICANT CHANGE UP (ref 0–0.9)
MONOCYTES NFR BLD AUTO: 9.1 % — SIGNIFICANT CHANGE UP (ref 2–14)
NEUTROPHILS # BLD AUTO: 3.86 K/UL — SIGNIFICANT CHANGE UP (ref 1.8–7.4)
NEUTROPHILS NFR BLD AUTO: 63.5 % — SIGNIFICANT CHANGE UP (ref 43–77)
NRBC # BLD: 0 /100 WBCS — SIGNIFICANT CHANGE UP (ref 0–0)
PLATELET # BLD AUTO: 191 K/UL — SIGNIFICANT CHANGE UP (ref 150–400)
POTASSIUM SERPL-MCNC: 4.2 MMOL/L — SIGNIFICANT CHANGE UP (ref 3.5–5.3)
POTASSIUM SERPL-SCNC: 4.2 MMOL/L — SIGNIFICANT CHANGE UP (ref 3.5–5.3)
PROT SERPL-MCNC: 6.4 G/DL — SIGNIFICANT CHANGE UP (ref 6–8.3)
RBC # BLD: 4.01 M/UL — SIGNIFICANT CHANGE UP (ref 3.8–5.2)
RBC # FLD: 14.8 % — HIGH (ref 10.3–14.5)
SODIUM SERPL-SCNC: 140 MMOL/L — SIGNIFICANT CHANGE UP (ref 135–145)
WBC # BLD: 6.07 K/UL — SIGNIFICANT CHANGE UP (ref 3.8–10.5)
WBC # FLD AUTO: 6.07 K/UL — SIGNIFICANT CHANGE UP (ref 3.8–10.5)

## 2023-04-15 PROCEDURE — 80061 LIPID PANEL: CPT

## 2023-04-15 PROCEDURE — 82550 ASSAY OF CK (CPK): CPT

## 2023-04-15 PROCEDURE — C1874: CPT

## 2023-04-15 PROCEDURE — 82553 CREATINE MB FRACTION: CPT

## 2023-04-15 PROCEDURE — 85025 COMPLETE CBC W/AUTO DIFF WBC: CPT

## 2023-04-15 PROCEDURE — 36415 COLL VENOUS BLD VENIPUNCTURE: CPT

## 2023-04-15 PROCEDURE — 82962 GLUCOSE BLOOD TEST: CPT

## 2023-04-15 PROCEDURE — 82565 ASSAY OF CREATININE: CPT

## 2023-04-15 PROCEDURE — 85610 PROTHROMBIN TIME: CPT

## 2023-04-15 PROCEDURE — C1887: CPT

## 2023-04-15 PROCEDURE — 83036 HEMOGLOBIN GLYCOSYLATED A1C: CPT

## 2023-04-15 PROCEDURE — 83735 ASSAY OF MAGNESIUM: CPT

## 2023-04-15 PROCEDURE — 92928 PRQ TCAT PLMT NTRAC ST 1 LES: CPT

## 2023-04-15 PROCEDURE — C1725: CPT

## 2023-04-15 PROCEDURE — 80053 COMPREHEN METABOLIC PANEL: CPT

## 2023-04-15 PROCEDURE — 99239 HOSP IP/OBS DSCHRG MGMT >30: CPT

## 2023-04-15 PROCEDURE — C1894: CPT

## 2023-04-15 PROCEDURE — 85347 COAGULATION TIME ACTIVATED: CPT

## 2023-04-15 PROCEDURE — C1769: CPT

## 2023-04-15 PROCEDURE — 85730 THROMBOPLASTIN TIME PARTIAL: CPT

## 2023-04-15 RX ORDER — ACETAMINOPHEN 500 MG
2 TABLET ORAL
Qty: 0 | Refills: 0 | DISCHARGE
Start: 2023-04-15

## 2023-04-15 RX ORDER — ACETAMINOPHEN 500 MG
650 TABLET ORAL ONCE
Refills: 0 | Status: COMPLETED | OUTPATIENT
Start: 2023-04-15 | End: 2023-04-15

## 2023-04-15 RX ADMIN — Medication 81 MILLIGRAM(S): at 10:38

## 2023-04-15 RX ADMIN — Medication 2: at 06:52

## 2023-04-15 RX ADMIN — Medication 650 MILLIGRAM(S): at 11:23

## 2023-04-15 RX ADMIN — Medication 650 MILLIGRAM(S): at 00:59

## 2023-04-15 RX ADMIN — Medication 650 MILLIGRAM(S): at 12:23

## 2023-04-15 RX ADMIN — CLOPIDOGREL BISULFATE 75 MILLIGRAM(S): 75 TABLET, FILM COATED ORAL at 10:38

## 2023-04-15 RX ADMIN — Medication 650 MILLIGRAM(S): at 05:52

## 2023-04-15 RX ADMIN — PANTOPRAZOLE SODIUM 40 MILLIGRAM(S): 20 TABLET, DELAYED RELEASE ORAL at 05:52

## 2023-04-15 RX ADMIN — Medication 20 MILLIGRAM(S): at 05:52

## 2023-04-15 RX ADMIN — LIDOCAINE 1 PATCH: 4 CREAM TOPICAL at 11:14

## 2023-04-15 RX ADMIN — Medication 650 MILLIGRAM(S): at 06:52

## 2023-04-15 RX ADMIN — LIDOCAINE 1 PATCH: 4 CREAM TOPICAL at 06:31

## 2023-04-15 RX ADMIN — AMLODIPINE BESYLATE 5 MILLIGRAM(S): 2.5 TABLET ORAL at 05:52

## 2023-04-15 RX ADMIN — Medication 650 MILLIGRAM(S): at 01:59

## 2023-04-15 NOTE — DISCHARGE NOTE NURSING/CASE MANAGEMENT/SOCIAL WORK - PATIENT PORTAL LINK FT
You can access the FollowMyHealth Patient Portal offered by [facility] by registering at the following website: [email or web address]. By joining [employer]’s FollowMyHealth portal, you will also be able to view your health information using other applications (apps) compatible with our system.

## 2023-04-15 NOTE — DISCHARGE NOTE PROVIDER - NSDCMRMEDTOKEN_GEN_ALL_CORE_FT
acetaminophen 325 mg oral tablet: 2 tab(s) orally every 6 hours As needed Moderate Pain (4 - 6)  albuterol 90 mcg/inh inhalation aerosol: 2 puff(s) inhaled 4 times a day, As Needed  amLODIPine 5 mg oral tablet: 1 tab(s) orally once a day  Aspirin Enteric Coated 81 mg oral delayed release tablet: 1 tab(s) orally once a day   atorvastatin 80 mg oral tablet: 1 tab(s) orally once a day  Creon: 2 tab(s) orally once a day  Creon: 1 tab(s) orally once a day (at bedtime)  Lasix 20 mg oral tablet: 1 tab(s) orally every other day  metFORMIN 500 mg oral tablet: 1 orally 2 times a day  NexIUM 40 mg oral delayed release capsule: 1 cap(s) orally once a day  Plavix 75 mg oral tablet: 1 tab(s) orally once a day  Wellbutrin  mg/24 hours oral tablet, extended release: 1 tab(s) orally every 24 hours

## 2023-04-15 NOTE — DISCHARGE NOTE NURSING/CASE MANAGEMENT/SOCIAL WORK - NSDCPEFALRISK_GEN_ALL_CORE
For information on Fall & Injury Prevention, visit: [email or web address] OR  [email or web address] OR  https://www.cdc.gov/steadi/patient.html

## 2023-04-15 NOTE — PATIENT PROFILE ADULT - FALL HARM RISK - DATE OF FALL
Notification Instructions: Patient will be notified of biopsy results. However, patient instructed to call the office if not contacted within 2 weeks. 01-Apr-2023

## 2023-04-15 NOTE — PATIENT PROFILE ADULT - FUNCTIONAL ASSESSMENT - BASIC MOBILITY ASSESSMENT TYPE
Subjective     Chief Complaint   Patient presents with   • Establish Care     New patient annualexam. Pt has  Mirena that she needs to discuss.        History of Present Illness    Nara Jacinto is a 27 y.o. female who presents for annual exam as a new patient. Last pap negative 2017- in [facility]. She had mirena removed and replaced in OR 10/2017. She is amenorrheic for the most part on the IUD. This is her third. She had some confusion regarding timing of IUD placement but after reviewing her chart with her she agrees this was replaced 10/2017.     Medical hx + thyroid dysfunction and hidradenitis suppurativa. Primary care manages thyroid and Nara sees dermatology for the hidradenitis.     Obstetric History:  OB History      Para Term  AB Living    1 1 1     1    SAB TAB Ectopic Molar Multiple Live Births              1         Menstrual History:     No LMP recorded. Patient is not currently having periods (Reason: Other).       Sexual History:       Current contraception: IUD  History of abnormal Pap smear: no  Received Gardasil immunization: pt unsure  Perform regular self breast exam: yes - encouraged  Family history of uterine or ovarian cancer: no  Family History of colon cancer: no  Family history of breast cancer: no    Mammogram: not indicated.  Colonoscopy: not indicated.  DEXA: not indicated.  Last Pap:    Social History    Tobacco Use      Smoking status: Never Smoker      Smokeless tobacco: Never Used    Exercise: moderately active  Calcium/Vitamin D: adequate intake    The following portions of the patient's history were reviewed and updated as appropriate: allergies, current medications, past family history, past medical history, past social history, past surgical history and problem list.    Review of Systems   Constitutional: Negative.    HENT: Negative.    Eyes: Negative for visual disturbance.   Respiratory: Negative for cough, shortness of breath and wheezing.  "   Cardiovascular: Negative for chest pain, palpitations and leg swelling.   Gastrointestinal: Negative for abdominal distention, abdominal pain, blood in stool, constipation, diarrhea, nausea and vomiting.   Endocrine: Negative for cold intolerance and heat intolerance.   Genitourinary: Negative for difficulty urinating, dyspareunia, dysuria, frequency, genital sores, hematuria, menstrual problem, pelvic pain, urgency, vaginal bleeding, vaginal discharge and vaginal pain.   Musculoskeletal: Negative.    Skin: Negative.    Neurological: Negative for dizziness, weakness, light-headedness, numbness and headaches.   Hematological: Negative.    Psychiatric/Behavioral: Negative.    Breasts: negative for lumps skin changes, dimpling, swelling, nipple changes/discharge bilaterally       Objective   Physical Exam    /78   Ht 167.6 cm (66\")   Wt 91.2 kg (201 lb)   BMI 32.44 kg/m²     General:   alert, appears stated age and cooperative       Heart: regular rate and rhythm, S1, S2 normal, no murmur, click, rub or gallop   Lungs: clear to auscultation bilaterally   Abdomen: soft, non-tender, without masses or organomegaly   Breast: inspection negative, no nipple discharge or bleeding, no masses or nodularity palpable   Vulva: normal   Vagina: normal mucosa, normal discharge   Cervix: no cervical motion tenderness, no lesions and IUD strings noted 2 cm from os   Uterus: normal size, mobile, non-tender, normal shape and consistency   Adnexa: no mass, fullness, tenderness         Assessment/Plan   Nara was seen today for establish care.    Diagnoses and all orders for this visit:    Well woman exam with routine gynecological exam  -     IGP,rfx Aptima HPV All Pth  -     Chlamydia trachomatis, Neisseria gonorrhoeae, Trichomonas vaginalis, PCR - Swab, Vagina        All questions answered.  Await pap smear results.  Breast self exam technique reviewed and patient encouraged to perform self-exam monthly.  Discussed " healthy lifestyle modifications.    Counseled on healthy lifestyle modifications, safe sex, condom use, and self breast exams.      Kalee Alcantar, APRN              Admission

## 2023-04-15 NOTE — DISCHARGE NOTE PROVIDER - HOSPITAL COURSE
#Discharge: do not delete    67-year-old female patient with PMHx of CAD s/p DEENA to dRCA, HTN, HLD, DM, COPD,  presented to cardiologist Dr. Su's office reporting chest pain with exertion over the last few months, 5/10 in intensity, alleviated by rest, associated with more SOB than usual as compared to her baseline BACA from COPD. She underwent cardiac cath with DEENA x1 to RPLS (75%), p/ mRCA stent is patent, LM normal, LCx 40%, LAD mild luminal irregularities.     Inpatient treatment course: Cardiac cath w/ DEENA x1 to RPLS  Patient was discharged to: Home   New medications: None  Changes to old medications: None  Medications that were stopped: None  Items to Follow up as outpatient: Follow with cardiology   Physical exam at time of discharge:   .  VITAL SIGNS:  T(C): 36.1 (04-15-23 @ 10:14), Max: 36.3 (04-15-23 @ 06:09)  T(F): 96.9 (04-15-23 @ 10:14), Max: 97.4 (04-15-23 @ 06:09)  HR: 75 (04-15-23 @ 11:26) (69 - 77)  BP: 125/63 (04-15-23 @ 11:26) (121/77 - 189/80)  BP(mean): 84 (04-15-23 @ 05:48) (84 - 115)  RR: 17 (04-15-23 @ 11:26) (16 - 18)  SpO2: 97% (04-15-23 @ 11:26) (97% - 99%)  Wt(kg): --    PHYSICAL EXAM:    Constitutional: resting comfortably in bed; NAD  Head: NC/AT  Eyes: PERRL, EOMI, anicteric sclera  ENT: no nasal discharge; uvula midline, no oropharyngeal erythema or exudates; MMM  Neck: supple; no JVD or thyromegaly  Respiratory: CTA B/L; no W/R/R, no retractions  Cardiac: +S1/S2; RRR; no M/R/G;   Gastrointestinal: abdomen soft, NT/ND; no rebound or guarding; +BSx4  Back: spine midline, no bony tenderness or step-offs; no CVAT B/L  Extremities: WWP, no clubbing or cyanosis; no peripheral edema  Musculoskeletal: NROM x4; no joint swelling, tenderness or erythema. Right wrist with mild ecchymosis, diffuse TTP throughout right arm. Pulses intact. sensation intact. No hematoma appreciated on exam. Full active and passive ROM of the right fingers, wrist, elbow and shoulder.   Vascular: 2+ radial, DP pulses B/L  Dermatologic: skin warm, dry and intact; no rashes, wounds, or scars  Lymphatic: no submandibular or cervical LAD  Neurologic: AAOx3; CNII-XII grossly intact; no focal deficits  Psychiatric: affect and characteristics of appearance, verbalizations, behaviors are appropriate

## 2023-04-15 NOTE — DISCHARGE NOTE PROVIDER - CARE PROVIDER_API CALL
Donell Su)  Cardiovascular Disease; Internal Medicine  [address], [address] [place] [address]  Phone: [phone]  Fax: [phone]  Established Patient  Scheduled Appointment: 04/19/2023 09:00 AM

## 2023-04-15 NOTE — DISCHARGE NOTE PROVIDER - PROVIDER TOKENS
PROVIDER:[TOKEN:[[id number]],SCHEDULEDAPPT:[04/19/2023],SCHEDULEDAPPTTIME:[09:00 AM],ESTABLISHEDPATIENT:[T]]

## 2023-04-15 NOTE — PATIENT PROFILE ADULT - FALL HARM RISK - HARM RISK INTERVENTIONS
Pt is going to go to UC and will follow up with you asap.    Assistance with ambulation/Assistance OOB with selected safe patient handling equipment/Communicate Risk of Fall with Harm to all staff/Monitor gait and stability/Reinforce activity limits and safety measures with patient and family/Sit up slowly, dangle for a short time, stand at bedside before walking/Tailored Fall Risk Interventions/Use of alarms - bed, chair and/or voice tab/Visual Cue: Yellow wristband and red socks/Bed in lowest position, wheels locked, appropriate side rails in place/Call bell, personal items and telephone in reach/Instruct patient to call for assistance before getting out of bed or chair/Non-slip footwear when patient is out of bed/[place] to call system/Physically safe environment - no spills, clutter or unnecessary equipment/Purposeful Proactive Rounding/Room/bathroom lighting operational, light cord in reach

## 2023-04-15 NOTE — PATIENT PROFILE ADULT - FUNCTIONAL SCREEN CURRENT LEVEL: SWALLOWING (IF SCORE 2 OR MORE FOR ANY ITEM, CONSULT REHAB SERVICES), MLM)
The patient was notified and he verbalized understanding. Medication list updated.  Prescription sent to the pharmacy.     0 = swallows foods/liquids without difficulty

## 2023-04-15 NOTE — DISCHARGE NOTE PROVIDER - NSDCCPCAREPLAN_GEN_ALL_CORE_FT
PRINCIPAL DISCHARGE DIAGNOSIS  Diagnosis: CAD (coronary artery disease)  Assessment and Plan of Treatment: You had successful percutaneous coronary intervention with a drug eluding stent placement in your right posterior lateral segement of your right coronary artery. Your previous stent was patent.   --Continue Aspirin 81mg by mouth daily and Plavix (CLOPIDROGEL) 75mg by mouth daily.   --DO NOT STOP TAKING ASPIRIN OR PLAVIX UNLESS INSTRUCTED BY YOUR CARDIOLOGIST, TO PREVENT STENT CLOSURE/HEART ATTACK.  *The catheter from your wrist was removed and you should remove the dressing in 24 hours.   *You may shower once the dressing is removed, but avoid baths, hot tubs, or swimming for 5 days to prevent infection.   *If you notice bleeding from the site, hardening and pain at the site, drainage or redness from the site, coolness/paleness of the extremity, swelling, or fever, please call seek medical attention.         SECONDARY DISCHARGE DIAGNOSES  Diagnosis: Diabetes mellitus  Assessment and Plan of Treatment: Your Hemoglobin A1c, which measures your average blood sugar level over the last three months, is 6.6 which is above the goal. Please continue to take metformin and be mindful of your diet.    Diagnosis: Hyperlipidemia  Assessment and Plan of Treatment: Your LDL is 86 and your goal LDL is less than 70. LDL is also known as "bad cholesterol" because it takes cholesterol to your arteries, where it may collect in artery walls. Too much cholesterol in your arteries may lead to a buildup of plaque known as atherosclerosis and contribute to heart disease. Please continue to take atorvastatin 80mg once per day at bedtime. Please follow-up with your cardiologist for further management.    Diagnosis: HTN (hypertension)  Assessment and Plan of Treatment: Hypertension is the medical term for high blood pressure. Blood pressure refers to the pressure that blood applies to the inner walls of the arteries. Arteries carry blood from the heart to other organs and parts of the body. Untreated high blood pressure increases the strain on the heart and arteries, eventually causing organ damage. High blood pressure increases the risk of heart failure, heart attack (myocardial infarction), stroke, and kidney failure. High blood pressure does not usually cause any symptoms. Treatment of hypertension usually begins with lifestyle changes. Making these lifestyle changes involves little or no risk. Recommended changes often include reducing the amount of salt in your diet, losing weight if you are overweight or obese, avoiding drinking too much alcohol, stopping smoking and exercising at least 30 minutes per day most days of the week. If you are prescribed medication for your hypertension it is important to take these as prescribed to prevent the possible complications of uncontrolled hypertension.  Please continue to take Norvasc 5mg once per day.

## 2023-04-21 DIAGNOSIS — Z79.82 LONG TERM (CURRENT) USE OF ASPIRIN: ICD-10-CM

## 2023-04-21 DIAGNOSIS — J44.9 CHRONIC OBSTRUCTIVE PULMONARY DISEASE, UNSPECIFIED: ICD-10-CM

## 2023-04-21 DIAGNOSIS — E11.9 TYPE 2 DIABETES MELLITUS WITHOUT COMPLICATIONS: ICD-10-CM

## 2023-04-21 DIAGNOSIS — Z87.11 PERSONAL HISTORY OF PEPTIC ULCER DISEASE: ICD-10-CM

## 2023-04-21 DIAGNOSIS — Z95.5 PRESENCE OF CORONARY ANGIOPLASTY IMPLANT AND GRAFT: ICD-10-CM

## 2023-04-21 DIAGNOSIS — Z88.0 ALLERGY STATUS TO PENICILLIN: ICD-10-CM

## 2023-04-21 DIAGNOSIS — Z88.1 ALLERGY STATUS TO OTHER ANTIBIOTIC AGENTS STATUS: ICD-10-CM

## 2023-04-21 DIAGNOSIS — Z88.2 ALLERGY STATUS TO SULFONAMIDES: ICD-10-CM

## 2023-04-21 DIAGNOSIS — I25.110 ATHEROSCLEROTIC HEART DISEASE OF NATIVE CORONARY ARTERY WITH UNSTABLE ANGINA PECTORIS: ICD-10-CM

## 2023-04-21 DIAGNOSIS — Z88.5 ALLERGY STATUS TO NARCOTIC AGENT: ICD-10-CM

## 2023-04-21 DIAGNOSIS — Z79.02 LONG TERM (CURRENT) USE OF ANTITHROMBOTICS/ANTIPLATELETS: ICD-10-CM

## 2023-04-21 DIAGNOSIS — K21.9 GASTRO-ESOPHAGEAL REFLUX DISEASE WITHOUT ESOPHAGITIS: ICD-10-CM

## 2023-04-21 DIAGNOSIS — F32.A DEPRESSION, UNSPECIFIED: ICD-10-CM

## 2023-04-21 DIAGNOSIS — E78.5 HYPERLIPIDEMIA, UNSPECIFIED: ICD-10-CM

## 2023-04-21 DIAGNOSIS — I50.32 CHRONIC DIASTOLIC (CONGESTIVE) HEART FAILURE: ICD-10-CM

## 2023-04-21 DIAGNOSIS — Z79.84 LONG TERM (CURRENT) USE OF ORAL HYPOGLYCEMIC DRUGS: ICD-10-CM

## 2023-04-21 DIAGNOSIS — I11.0 HYPERTENSIVE HEART DISEASE WITH HEART FAILURE: ICD-10-CM

## 2023-04-21 DIAGNOSIS — F41.9 ANXIETY DISORDER, UNSPECIFIED: ICD-10-CM

## 2023-04-21 DIAGNOSIS — Z87.891 PERSONAL HISTORY OF NICOTINE DEPENDENCE: ICD-10-CM

## 2024-02-27 ENCOUNTER — APPOINTMENT (OUTPATIENT)
Dept: VASCULAR SURGERY | Facility: CLINIC | Age: 69
End: 2024-02-27
Payer: MEDICARE

## 2024-02-27 VITALS
HEART RATE: 76 BPM | SYSTOLIC BLOOD PRESSURE: 115 MMHG | BODY MASS INDEX: 27.43 KG/M2 | WEIGHT: 181 LBS | HEIGHT: 68 IN | DIASTOLIC BLOOD PRESSURE: 74 MMHG

## 2024-02-27 DIAGNOSIS — I65.23 OCCLUSION AND STENOSIS OF BILATERAL CAROTID ARTERIES: ICD-10-CM

## 2024-02-27 DIAGNOSIS — Z87.891 PERSONAL HISTORY OF NICOTINE DEPENDENCE: ICD-10-CM

## 2024-02-27 PROCEDURE — 93880 EXTRACRANIAL BILAT STUDY: CPT

## 2024-02-27 PROCEDURE — 99204 OFFICE O/P NEW MOD 45 MIN: CPT

## 2024-02-28 LAB
ALBUMIN SERPL ELPH-MCNC: 4.3 G/DL
ALP BLD-CCNC: 87 U/L
ALT SERPL-CCNC: 24 U/L
ANION GAP SERPL CALC-SCNC: 13 MMOL/L
APTT BLD: 30.2 SEC
AST SERPL-CCNC: 22 U/L
BILIRUB SERPL-MCNC: 0.3 MG/DL
BUN SERPL-MCNC: 37 MG/DL
CALCIUM SERPL-MCNC: 9.6 MG/DL
CHLORIDE SERPL-SCNC: 104 MMOL/L
CO2 SERPL-SCNC: 25 MMOL/L
CREAT SERPL-MCNC: 1.33 MG/DL
EGFR: 43 ML/MIN/1.73M2
GLUCOSE SERPL-MCNC: 152 MG/DL
HCT VFR BLD CALC: 34.6 %
HGB BLD-MCNC: 10.6 G/DL
INR PPP: 0.94 RATIO
MCHC RBC-ENTMCNC: 26.7 PG
MCHC RBC-ENTMCNC: 30.6 GM/DL
MCV RBC AUTO: 87.2 FL
PLATELET # BLD AUTO: 227 K/UL
POTASSIUM SERPL-SCNC: 4.7 MMOL/L
PROT SERPL-MCNC: 6.8 G/DL
PT BLD: 10.6 SEC
RBC # BLD: 3.97 M/UL
RBC # FLD: 16.5 %
SODIUM SERPL-SCNC: 142 MMOL/L
WBC # FLD AUTO: 7.87 K/UL

## 2024-02-28 RX ORDER — CHLORHEXIDINE GLUCONATE 4 %
5 LIQUID (ML) TOPICAL
Refills: 0 | Status: ACTIVE | COMMUNITY

## 2024-02-28 RX ORDER — ALBUTEROL SULFATE 90 UG/1
108 (90 BASE) INHALANT RESPIRATORY (INHALATION)
Refills: 0 | Status: ACTIVE | COMMUNITY

## 2024-02-28 RX ORDER — METFORMIN HYDROCHLORIDE 500 MG/1
500 TABLET, COATED ORAL
Refills: 0 | Status: ACTIVE | COMMUNITY

## 2024-02-28 RX ORDER — DICLOFENAC SODIUM 10 MG/G
1 GEL TOPICAL
Refills: 0 | Status: ACTIVE | COMMUNITY

## 2024-02-28 RX ORDER — TRAMADOL HYDROCHLORIDE 25 MG/1
TABLET, COATED ORAL
Refills: 0 | Status: ACTIVE | COMMUNITY

## 2024-02-28 RX ORDER — AMLODIPINE BESYLATE 5 MG/1
5 TABLET ORAL
Refills: 0 | Status: ACTIVE | COMMUNITY

## 2024-02-28 RX ORDER — FUROSEMIDE 20 MG/1
20 TABLET ORAL
Refills: 0 | Status: ACTIVE | COMMUNITY

## 2024-02-28 RX ORDER — RIMEGEPANT SULFATE 75 MG/75MG
75 TABLET, ORALLY DISINTEGRATING ORAL
Refills: 0 | Status: ACTIVE | COMMUNITY

## 2024-02-28 RX ORDER — BUPROPION HYDROCHLORIDE 150 MG/1
150 TABLET, FILM COATED, EXTENDED RELEASE ORAL
Refills: 0 | Status: ACTIVE | COMMUNITY

## 2024-02-28 NOTE — HISTORY OF PRESENT ILLNESS
[FreeTextEntry1] : 68yoF w/PMHx of HTN, HLD, Type 2 DM, mild claudication, CAD s/p PTCA w/stents (last PTCA a few months prior), previous smoking hx (quit 9y prior), referred by Dr. Pablo Dominguez for evaluation of her high-grade L ICA stenosis.  She states a screening duplex was performed that suggested b/l ICA stenosis which was confirmed w/CTA neck.  Pt also reports intermittent SOB and dizziness.  Pt denies family h/o CVA/TIA and denies any personal neurologic deficits or events in the past.  She reports no issues w/her neck or lying flat.  Currently, she takes ASA, Plavix, and atorvastatin.

## 2024-02-28 NOTE — REASON FOR VISIT
[Family Member] : family member [Consultation] : a consultation visit [FreeTextEntry1] : b/l ICA stenosis, asymptomatic

## 2024-02-28 NOTE — ASSESSMENT
[FreeTextEntry1] : 68yoF w/PMHx of HTN, HLD, Type 2 DM, mild claudication, CAD s/p PTCA w/stents (last PTCA a few months prior), previous smoking hx (quit 9y prior), referred by Dr. Pablo Dominguez for evaluation of her high-grade L ICA stenosis.  She states a screening duplex was performed that suggested b/l ICA stenosis which was confirmed w/CTA neck.  Pt also reports intermittent SOB and dizziness.  Pt denies family h/o CVA/TIA and denies any personal neurologic deficits or events in the past.  She reports no issues w/her neck or lying flat.  Currently, she takes ASA, Plavix, and atorvastatin.  Normal neuro exam today and carotid duplex performed to evaluate for ICA stenosis demonstrates 50-69% stenosis of the R ICA, L ICA stenosis >70%, vertebral arteries widely patent w/antegrade flow b/l.  Discussed options for revascularization of the L ICA including L CEA and PTA/stent w/TCAR, risks/benefits for all procedures reviewed.  Pt in agreement w/plan to proceed w/L ICA PTA/stent w/TCAR in the OR in the coming weeks.  As per Dr. Dominguez, in light of recent cardiac cath, pt is cleared to proceed w/surgery from a cardiac standpoint.

## 2024-02-28 NOTE — PHYSICAL EXAM
[JVD] : no jugular venous distention  [Carotid Bruits] : carotid bruit  [Normal Breath Sounds] : Normal breath sounds [Respiratory Effort] : normal respiratory effort [Normal Rate and Rhythm] : normal rate and rhythm [Normal Heart Sounds] : normal heart sounds [Right Carotid Bruit] : right carotid bruit heard [Left Carotid Bruit] : left carotid bruit heard [2+] : right 2+ [Ankle Swelling (On Exam)] : not present [Varicose Veins Of Lower Extremities] : not present [] : not present [Abdomen Masses] : No abdominal masses [Abdomen Tenderness] : ~T ~M No abdominal tenderness [No Rash or Lesion] : No rash or lesion [Purpura] : no purpura  [Petechiae] : no petechiae [Skin Induration] : no induration [Skin Ulcer] : no ulcer [Alert] : alert [Calm] : calm [de-identified] : NC/AT, noraictrafa, EOMIx6 [de-identified] : Healthy, NAD [de-identified] : CNII-XII grossly intact [de-identified] : FROM throughout, strength 5/5x4, NEG Romberg/Pronator drift

## 2024-02-28 NOTE — ADDENDUM
[FreeTextEntry1] : This note was written by Omega See, acting as a scribe for Dr. Crystal Novak.  I, Dr. Crystal Novak, have read and attest that all the information, medical decision-making, and discharge instructions within are true and accurate.  I, Dr. Crystal Novak, personally performed the evaluation and management (E/M) services for this new patient.  That E/M includes conducting the initial examination, assessing all conditions, and establishing the plan of care.  Today, my ACP, Omega See, was here to observe my evaluation and management services for this patient to be followed going forward.

## 2024-02-28 NOTE — PROCEDURE
[FreeTextEntry1] : Carotid duplex performed to evaluate for ICA stenosis demonstrates 50-69% stenosis of the R ICA, L ICA stenosis >70%, vertebral arteries widely patent w/antegrade flow b/l.

## 2024-03-04 NOTE — H&P ADULT - NSHPPOADEEPVENOUSTHROMB_GEN_A_CORE
Quality 226: Preventive Care And Screening: Tobacco Use: Screening And Cessation Intervention: Patient screened for tobacco use, is a smoker AND received Cessation Counseling within measurement period or in the six months prior to the measurement period
Quality 130: Documentation Of Current Medications In The Medical Record: Current Medications Documented
Detail Level: Detailed
no

## 2024-03-12 VITALS
DIASTOLIC BLOOD PRESSURE: 80 MMHG | WEIGHT: 172.84 LBS | TEMPERATURE: 97 F | SYSTOLIC BLOOD PRESSURE: 148 MMHG | HEART RATE: 96 BPM | OXYGEN SATURATION: 98 % | RESPIRATION RATE: 16 BRPM | HEIGHT: 68 IN

## 2024-03-12 RX ORDER — CLOPIDOGREL BISULFATE 75 MG/1
1 TABLET, FILM COATED ORAL
Refills: 0 | DISCHARGE

## 2024-03-12 RX ORDER — CLOPIDOGREL BISULFATE 75 MG/1
1 TABLET, FILM COATED ORAL
Qty: 0 | Refills: 0 | DISCHARGE

## 2024-03-12 RX ORDER — FUROSEMIDE 40 MG
1 TABLET ORAL
Qty: 0 | Refills: 0 | DISCHARGE

## 2024-03-12 RX ORDER — ASPIRIN/CALCIUM CARB/MAGNESIUM 324 MG
1 TABLET ORAL
Qty: 0 | Refills: 0 | DISCHARGE

## 2024-03-12 RX ORDER — SIMVASTATIN 20 MG/1
1 TABLET, FILM COATED ORAL
Qty: 0 | Refills: 0 | DISCHARGE

## 2024-03-12 RX ORDER — ATORVASTATIN CALCIUM 80 MG/1
1 TABLET, FILM COATED ORAL
Refills: 0 | DISCHARGE

## 2024-03-12 RX ORDER — ACETAMINOPHEN 500 MG
1 TABLET ORAL
Qty: 0 | Refills: 0 | DISCHARGE

## 2024-03-12 RX ORDER — RANOLAZINE 500 MG/1
1 TABLET, FILM COATED, EXTENDED RELEASE ORAL
Qty: 0 | Refills: 0 | DISCHARGE

## 2024-03-12 RX ORDER — ATORVASTATIN CALCIUM 80 MG/1
1 TABLET, FILM COATED ORAL
Qty: 0 | Refills: 0 | DISCHARGE

## 2024-03-12 RX ORDER — METFORMIN HYDROCHLORIDE 850 MG/1
1 TABLET ORAL
Refills: 0 | DISCHARGE

## 2024-03-12 RX ORDER — LIPASE/PROTEASE/AMYLASE 16-48-48K
1 CAPSULE,DELAYED RELEASE (ENTERIC COATED) ORAL
Qty: 0 | Refills: 0 | DISCHARGE

## 2024-03-12 RX ORDER — LANOLIN ALCOHOL/MO/W.PET/CERES
1 CREAM (GRAM) TOPICAL
Qty: 0 | Refills: 0 | DISCHARGE

## 2024-03-12 NOTE — PATIENT PROFILE ADULT - STATED REASON FOR ADMISSION
left internal Carotid (ICA) percutaneous Transluminal Angioplasty  stent with TCAR ( Transcarotid Endarterectomy)

## 2024-03-12 NOTE — PATIENT PROFILE ADULT - FUNCTIONAL SCREEN CURRENT LEVEL: SWALLOWING (IF SCORE 2 OR MORE FOR ANY ITEM, CONSULT REHAB SERVICES), MLM)
INTRODUCTION:  The patient is a 89-year-old woman, who had an episode of loss   of consciousness.  This was a 21-channel routine digital EEG.      EEG DESCRIPTION:  The awake background included the 9 hertz posterior   predominant alpha rhythm that attenuated with eye opening.  During drowsiness   identified by ocular signs and alpha attenuation, there was 1-3 hertz   frontotemporal polymorphic delta activity.  Stage 2 sleep was identified by   sleep spindles and K complexes.  Hyperventilation was not performed.  Photic   strobe stimulation did not elicit any abnormalities.  There were no focal,   lateralized, or epileptiform abnormalities.      INTERPRETATION:  This EEG was normal awake, drowsy, and asleep.                  MD GERMAIN White/MedQ     DD:  05/12/2017 16:10:58 / DT:  05/12/2017 19:54:53     Job #:   [id number]        
0 = swallows foods/liquids without difficulty

## 2024-03-12 NOTE — PATIENT PROFILE ADULT - FALL HARM RISK - HARM RISK INTERVENTIONS
Assistance with ambulation/Assistance OOB with selected safe patient handling equipment/Communicate Risk of Fall with Harm to all staff/Discuss with provider need for PT consult/Monitor gait and stability/Provide patient with walking aids - walker, cane, crutches/Reinforce activity limits and safety measures with patient and family/Sit up slowly, dangle for a short time, stand at bedside before walking/Tailored Fall Risk Interventions/Visual Cue: Yellow wristband and red socks/Bed in lowest position, wheels locked, appropriate side rails in place/Call bell, personal items and telephone in reach/Instruct patient to call for assistance before getting out of bed or chair/Non-slip footwear when patient is out of bed/[place] to call system/Physically safe environment - no spills, clutter or unnecessary equipment/Purposeful Proactive Rounding/Room/bathroom lighting operational, light cord in reach

## 2024-03-12 NOTE — PATIENT PROFILE ADULT - NSPROGENPREVTRANSF_GEN_A_NUR
age(85 years old or older)/bones(Osteoporosis,prev fx,steroid use,metastatic bone ca)
no history of blood product transfusion

## 2024-03-13 ENCOUNTER — APPOINTMENT (OUTPATIENT)
Dept: VASCULAR SURGERY | Facility: HOSPITAL | Age: 69
End: 2024-03-13

## 2024-03-13 ENCOUNTER — TRANSCRIPTION ENCOUNTER (OUTPATIENT)
Age: 69
End: 2024-03-13

## 2024-03-13 ENCOUNTER — INPATIENT (INPATIENT)
Facility: HOSPITAL | Age: 69
LOS: 0 days | Discharge: HOME | DRG: 35 | End: 2024-03-14
Attending: SURGERY | Admitting: SURGERY
Payer: MEDICARE

## 2024-03-13 DIAGNOSIS — Z87.19 PERSONAL HISTORY OF OTHER DISEASES OF THE DIGESTIVE SYSTEM: Chronic | ICD-10-CM

## 2024-03-13 LAB
ANION GAP SERPL CALC-SCNC: 11 MMOL/L — SIGNIFICANT CHANGE UP (ref 5–17)
APTT BLD: 30.6 SEC — SIGNIFICANT CHANGE UP (ref 24.5–35.6)
BLD GP AB SCN SERPL QL: NEGATIVE — SIGNIFICANT CHANGE UP
BUN SERPL-MCNC: 23 MG/DL — SIGNIFICANT CHANGE UP (ref 7–23)
CALCIUM SERPL-MCNC: 8.4 MG/DL — SIGNIFICANT CHANGE UP (ref 8.4–10.5)
CHLORIDE SERPL-SCNC: 110 MMOL/L — HIGH (ref 96–108)
CO2 SERPL-SCNC: 21 MMOL/L — LOW (ref 22–31)
CREAT SERPL-MCNC: 0.83 MG/DL — SIGNIFICANT CHANGE UP (ref 0.5–1.3)
EGFR: 76 ML/MIN/1.73M2 — SIGNIFICANT CHANGE UP
GLUCOSE BLDC GLUCOMTR-MCNC: 128 MG/DL — HIGH (ref 70–99)
GLUCOSE BLDC GLUCOMTR-MCNC: 160 MG/DL — HIGH (ref 70–99)
GLUCOSE BLDC GLUCOMTR-MCNC: 167 MG/DL — HIGH (ref 70–99)
GLUCOSE SERPL-MCNC: 170 MG/DL — HIGH (ref 70–99)
HCT VFR BLD CALC: 28.7 % — LOW (ref 34.5–45)
HGB BLD-MCNC: 9.3 G/DL — LOW (ref 11.5–15.5)
INR BLD: 0.97 — SIGNIFICANT CHANGE UP (ref 0.85–1.18)
ISTAT ACTK (ACTIVATED CLOTTING TIME KAOLIN): 141 SEC — HIGH (ref 74–137)
ISTAT ACTK (ACTIVATED CLOTTING TIME KAOLIN): 250 SEC — HIGH (ref 74–137)
MAGNESIUM SERPL-MCNC: 1.8 MG/DL — SIGNIFICANT CHANGE UP (ref 1.6–2.6)
MCHC RBC-ENTMCNC: 26.8 PG — LOW (ref 27–34)
MCHC RBC-ENTMCNC: 32.4 GM/DL — SIGNIFICANT CHANGE UP (ref 32–36)
MCV RBC AUTO: 82.7 FL — SIGNIFICANT CHANGE UP (ref 80–100)
NRBC # BLD: 0 /100 WBCS — SIGNIFICANT CHANGE UP (ref 0–0)
PHOSPHATE SERPL-MCNC: 3.5 MG/DL — SIGNIFICANT CHANGE UP (ref 2.5–4.5)
PLATELET # BLD AUTO: 217 K/UL — SIGNIFICANT CHANGE UP (ref 150–400)
POTASSIUM SERPL-MCNC: 4.1 MMOL/L — SIGNIFICANT CHANGE UP (ref 3.5–5.3)
POTASSIUM SERPL-SCNC: 4.1 MMOL/L — SIGNIFICANT CHANGE UP (ref 3.5–5.3)
PROTHROM AB SERPL-ACNC: 11.1 SEC — SIGNIFICANT CHANGE UP (ref 9.5–13)
RBC # BLD: 3.47 M/UL — LOW (ref 3.8–5.2)
RBC # FLD: 15.6 % — HIGH (ref 10.3–14.5)
RH IG SCN BLD-IMP: POSITIVE — SIGNIFICANT CHANGE UP
SODIUM SERPL-SCNC: 142 MMOL/L — SIGNIFICANT CHANGE UP (ref 135–145)
WBC # BLD: 7.32 K/UL — SIGNIFICANT CHANGE UP (ref 3.8–10.5)
WBC # FLD AUTO: 7.32 K/UL — SIGNIFICANT CHANGE UP (ref 3.8–10.5)

## 2024-03-13 PROCEDURE — 37215 TRANSCATH STENT CCA W/EPS: CPT | Mod: GC,LT

## 2024-03-13 PROCEDURE — 76937 US GUIDE VASCULAR ACCESS: CPT | Mod: 26,GC

## 2024-03-13 PROCEDURE — 93010 ELECTROCARDIOGRAM REPORT: CPT

## 2024-03-13 DEVICE — INTRO MICROPUNC 5FRX10CM SS: Type: IMPLANTABLE DEVICE | Status: FUNCTIONAL

## 2024-03-13 DEVICE — ARISTA 3GR: Type: IMPLANTABLE DEVICE | Status: FUNCTIONAL

## 2024-03-13 DEVICE — GWIRE BENTSON 0.035INX180CM: Type: IMPLANTABLE DEVICE | Status: FUNCTIONAL

## 2024-03-13 DEVICE — LIGATING CLIPS WECK HORIZON SMALL-WIDE (RED) 24: Type: IMPLANTABLE DEVICE | Status: FUNCTIONAL

## 2024-03-13 DEVICE — SHEATH INTRODUCER TERUMO PINNACLE CORONARY 5FR X 10CM X 0.038" MINI WIRE: Type: IMPLANTABLE DEVICE | Status: FUNCTIONAL

## 2024-03-13 DEVICE — BLLN STERLING MONORAIL 5X30MMX135CM: Type: IMPLANTABLE DEVICE | Status: FUNCTIONAL

## 2024-03-13 DEVICE — KIT INTRODUCER MICRO 21GAX7CM 7FR: Type: IMPLANTABLE DEVICE | Status: FUNCTIONAL

## 2024-03-13 DEVICE — GWIRE ENROUTE 0.014X95CM: Type: IMPLANTABLE DEVICE | Status: FUNCTIONAL

## 2024-03-13 DEVICE — SHEATH INTRODUCER TERUMO PINNACLE CORONARY 6FR X 10CM X 0.038" MINI WIRE: Type: IMPLANTABLE DEVICE | Status: FUNCTIONAL

## 2024-03-13 DEVICE — STENT TRANSCAROTID ENROUTE 8X40: Type: IMPLANTABLE DEVICE | Status: FUNCTIONAL

## 2024-03-13 DEVICE — LIGATING CLIPS WECK HORIZON SMALL (YELLOW) 24: Type: IMPLANTABLE DEVICE | Status: FUNCTIONAL

## 2024-03-13 DEVICE — SYS TRANSCAROTID NEUROPROTECTION: Type: IMPLANTABLE DEVICE | Status: FUNCTIONAL

## 2024-03-13 RX ORDER — DEXTROSE 50 % IN WATER 50 %
15 SYRINGE (ML) INTRAVENOUS ONCE
Refills: 0 | Status: DISCONTINUED | OUTPATIENT
Start: 2024-03-13 | End: 2024-03-14

## 2024-03-13 RX ORDER — BENZOCAINE AND MENTHOL 5; 1 G/100ML; G/100ML
1 LIQUID ORAL EVERY 4 HOURS
Refills: 0 | Status: DISCONTINUED | OUTPATIENT
Start: 2024-03-13 | End: 2024-03-14

## 2024-03-13 RX ORDER — MAGNESIUM SULFATE 500 MG/ML
1 VIAL (ML) INJECTION ONCE
Refills: 0 | Status: COMPLETED | OUTPATIENT
Start: 2024-03-13 | End: 2024-03-13

## 2024-03-13 RX ORDER — BUPROPION HYDROCHLORIDE 150 MG/1
1 TABLET, EXTENDED RELEASE ORAL
Qty: 0 | Refills: 0 | DISCHARGE

## 2024-03-13 RX ORDER — DICLOFENAC SODIUM 30 MG/G
2 GEL TOPICAL
Refills: 0 | DISCHARGE

## 2024-03-13 RX ORDER — ATORVASTATIN CALCIUM 80 MG/1
80 TABLET, FILM COATED ORAL AT BEDTIME
Refills: 0 | Status: DISCONTINUED | OUTPATIENT
Start: 2024-03-13 | End: 2024-03-14

## 2024-03-13 RX ORDER — DICLOFENAC SODIUM 75 MG/1
2 TABLET, DELAYED RELEASE ORAL
Refills: 0 | DISCHARGE

## 2024-03-13 RX ORDER — CEFAZOLIN SODIUM 1 G
2000 VIAL (EA) INJECTION EVERY 8 HOURS
Refills: 0 | Status: COMPLETED | OUTPATIENT
Start: 2024-03-13 | End: 2024-03-14

## 2024-03-13 RX ORDER — INSULIN LISPRO 100/ML
VIAL (ML) SUBCUTANEOUS
Refills: 0 | Status: DISCONTINUED | OUTPATIENT
Start: 2024-03-13 | End: 2024-03-14

## 2024-03-13 RX ORDER — ESOMEPRAZOLE MAGNESIUM 40 MG/1
1 CAPSULE, DELAYED RELEASE ORAL
Qty: 0 | Refills: 0 | DISCHARGE

## 2024-03-13 RX ORDER — DEXTROSE 50 % IN WATER 50 %
25 SYRINGE (ML) INTRAVENOUS ONCE
Refills: 0 | Status: DISCONTINUED | OUTPATIENT
Start: 2024-03-13 | End: 2024-03-14

## 2024-03-13 RX ORDER — LIDOCAINE 4 G/100G
1 CREAM TOPICAL ONCE
Refills: 0 | Status: COMPLETED | OUTPATIENT
Start: 2024-03-13 | End: 2024-03-13

## 2024-03-13 RX ORDER — GLUCAGON INJECTION, SOLUTION 0.5 MG/.1ML
1 INJECTION, SOLUTION SUBCUTANEOUS ONCE
Refills: 0 | Status: DISCONTINUED | OUTPATIENT
Start: 2024-03-13 | End: 2024-03-14

## 2024-03-13 RX ORDER — SODIUM CHLORIDE 9 MG/ML
1000 INJECTION, SOLUTION INTRAVENOUS
Refills: 0 | Status: DISCONTINUED | OUTPATIENT
Start: 2024-03-13 | End: 2024-03-14

## 2024-03-13 RX ORDER — ALBUTEROL 90 UG/1
2 AEROSOL, METERED ORAL
Qty: 0 | Refills: 0 | DISCHARGE

## 2024-03-13 RX ORDER — ASPIRIN/CALCIUM CARB/MAGNESIUM 324 MG
81 TABLET ORAL DAILY
Refills: 0 | Status: DISCONTINUED | OUTPATIENT
Start: 2024-03-14 | End: 2024-03-14

## 2024-03-13 RX ORDER — BUPROPION HYDROCHLORIDE 150 MG/1
150 TABLET, EXTENDED RELEASE ORAL DAILY
Refills: 0 | Status: DISCONTINUED | OUTPATIENT
Start: 2024-03-14 | End: 2024-03-14

## 2024-03-13 RX ORDER — ACETAMINOPHEN 500 MG
1000 TABLET ORAL ONCE
Refills: 0 | Status: DISCONTINUED | OUTPATIENT
Start: 2024-03-13 | End: 2024-03-14

## 2024-03-13 RX ORDER — PANTOPRAZOLE SODIUM 20 MG/1
40 TABLET, DELAYED RELEASE ORAL
Refills: 0 | Status: DISCONTINUED | OUTPATIENT
Start: 2024-03-13 | End: 2024-03-14

## 2024-03-13 RX ORDER — AMLODIPINE BESYLATE 2.5 MG/1
1 TABLET ORAL
Qty: 0 | Refills: 0 | DISCHARGE

## 2024-03-13 RX ORDER — ALBUTEROL 90 UG/1
1 AEROSOL, METERED ORAL
Refills: 0 | Status: DISCONTINUED | OUTPATIENT
Start: 2024-03-13 | End: 2024-03-14

## 2024-03-13 RX ORDER — SODIUM CHLORIDE 9 MG/ML
1000 INJECTION INTRAMUSCULAR; INTRAVENOUS; SUBCUTANEOUS
Refills: 0 | Status: DISCONTINUED | OUTPATIENT
Start: 2024-03-13 | End: 2024-03-13

## 2024-03-13 RX ORDER — ACETAMINOPHEN 500 MG
1000 TABLET ORAL ONCE
Refills: 0 | Status: COMPLETED | OUTPATIENT
Start: 2024-03-13 | End: 2024-03-13

## 2024-03-13 RX ORDER — CLOPIDOGREL BISULFATE 75 MG/1
75 TABLET, FILM COATED ORAL DAILY
Refills: 0 | Status: DISCONTINUED | OUTPATIENT
Start: 2024-03-14 | End: 2024-03-14

## 2024-03-13 RX ORDER — AMLODIPINE BESYLATE 2.5 MG/1
5 TABLET ORAL DAILY
Refills: 0 | Status: DISCONTINUED | OUTPATIENT
Start: 2024-03-14 | End: 2024-03-14

## 2024-03-13 RX ORDER — RIMEGEPANT SULFATE 75 MG/75MG
1 TABLET, ORALLY DISINTEGRATING ORAL
Refills: 0 | DISCHARGE

## 2024-03-13 RX ORDER — KETOROLAC TROMETHAMINE 30 MG/ML
10 SYRINGE (ML) INJECTION ONCE
Refills: 0 | Status: DISCONTINUED | OUTPATIENT
Start: 2024-03-13 | End: 2024-03-13

## 2024-03-13 RX ADMIN — Medication 2: at 15:21

## 2024-03-13 RX ADMIN — LIDOCAINE 1 PATCH: 4 CREAM TOPICAL at 14:38

## 2024-03-13 RX ADMIN — Medication 1000 MILLIGRAM(S): at 20:23

## 2024-03-13 RX ADMIN — Medication 100 GRAM(S): at 14:38

## 2024-03-13 RX ADMIN — Medication 10 MILLIGRAM(S): at 15:34

## 2024-03-13 RX ADMIN — ATORVASTATIN CALCIUM 80 MILLIGRAM(S): 80 TABLET, FILM COATED ORAL at 23:09

## 2024-03-13 RX ADMIN — Medication 2: at 23:09

## 2024-03-13 RX ADMIN — Medication 10 MILLIGRAM(S): at 15:42

## 2024-03-13 RX ADMIN — Medication 400 MILLIGRAM(S): at 20:08

## 2024-03-13 RX ADMIN — LIDOCAINE 1 PATCH: 4 CREAM TOPICAL at 18:04

## 2024-03-13 RX ADMIN — SODIUM CHLORIDE 75 MILLILITER(S): 9 INJECTION INTRAMUSCULAR; INTRAVENOUS; SUBCUTANEOUS at 14:39

## 2024-03-13 RX ADMIN — Medication 100 MILLIGRAM(S): at 20:15

## 2024-03-13 NOTE — H&P ADULT - NSHPLABSRESULTS_GEN_ALL_CORE
LABS:  cret                        9.3    7.32  )-----------( 217      ( 13 Mar 2024 13:49 )             28.7     03-13    142  |  110<H>  |  23  ----------------------------<  170<H>  4.1   |  21<L>  |  0.83    Ca    8.4      13 Mar 2024 13:49  Phos  3.5     03-13  Mg     1.8     03-13      PT/INR - ( 13 Mar 2024 13:49 )   PT: 11.1 sec;   INR: 0.97          PTT - ( 13 Mar 2024 13:49 )  PTT:30.6 sec

## 2024-03-13 NOTE — H&P ADULT - NSICDXPASTMEDICALHX_GEN_ALL_CORE_FT
PAST MEDICAL HISTORY:  Anxiety panic attacks    Arthritis     CAD (coronary artery disease) stents x3    COPD (chronic obstructive pulmonary disease)     Depression     GERD (gastroesophageal reflux disease)     H/O headache pinched nerve in neck    Heart failure, diastolic, chronic denies    Hyperlipidemia     Hypertension     PUD (peptic ulcer disease)

## 2024-03-13 NOTE — H&P ADULT - HISTORY OF PRESENT ILLNESS
68yoF w/PMHx of HTN, HLD, Type 2 DM, mild claudication, CAD s/p PTCA w/stents (last PTCA a few months prior), previous smoking hx (quit 9y prior), Initially referred to outpatient office by  Dr. Pablo Dominguez for evaluation of her high-grade L ICA stenosis. She had OSH/OSC w/u  with a screening duplex and CTA neck which revealed b/l carotid stenosis. Repeat Duplex at office revealed 50-69% stenosis of proximal R ICA and >70% stenosis of proximal L ICA . Pt clinical course has been asymptomatic w/ no hx of CVA/TIA . She denies any neurologic deficits or events in the past. She reports hx of "shooting pains" in her neck and subsequent b/l headaches for which she uses topical diclofenac and was previously trialed on tramadol(no longer takes). She was on DAPT with  ASA, Plavix, and high intensity statin for her coronary stents prior to her office visit which she states she took prior to arriving to the hospital today. She is now s/p left-sided transcarotid artery revascularization with intra-operative flow reversal, balloon angioplasty and stenting. Admitted to inpatient tele for post-op neuro and hemodynamic monitoring. 68yoF w/PMHx of HTN, HLD, Type 2 DM, mild claudication, CAD s/p PTCA w/stents (last PTCA a few months prior), previous smoking hx (quit 9y prior), Initially referred to outpatient office by  Dr. Pablo Dominguez for evaluation of her high-grade L ICA stenosis. She had OSH/OSC w/u  with a screening duplex and CTA neck which revealed b/l carotid stenosis. Repeat Duplex at office revealed 50-69% stenosis of proximal R ICA and >70% stenosis of proximal L ICA . Pt clinical course has been asymptomatic w/ no hx of CVA/TIA . She denies any neurologic deficits or events in the past. She reports hx of "shooting pains" in her neck and subsequent b/l headaches for which she uses topical diclofenac and Nurtec and was previously trialed on tramadol(no longer takes). She was on DAPT with  ASA, Plavix, and high intensity statin for her coronary stents prior to her office visit which she states she took prior to arriving to the hospital today. She is now s/p left-sided transcarotid artery revascularization with intra-operative flow reversal, balloon angioplasty and stenting. Admitted to inpatient tele for post-op neuro and hemodynamic monitoring.

## 2024-03-13 NOTE — DISCHARGE NOTE PROVIDER - NSDCFUADDINST_GEN_ALL_CORE_FT
FOLLOW UP:  ___ in 1 week. Your appointment has been made for _______. Call the office at  with any questions.    WOUND CARE: You may shower; soap and water over incision sites. Do not scrub. Pat dry when done.     ACTIVITY: Ambulate as tolerated, but no heavy lifting (>10lbs) or strenuous exercise. NO sharp neck turns. NO driving until you see surgeon in office. If you have a persistent headache that is not improved with Tylenol, please call MD.  Call the office if you experience increasing pain, redness, swelling or drainage from incision sites/wounds, or temperature >101.4F.     NEW MEDICATIONS:    ADDITIONAL FOLLOW UP AFTER DISCHARGE:    DISCHARGE DESTINATION:     DISCHARGE EDUCATION:    STROKE EDUCATION: Call 911 if you or someone you know experiences the following symptoms of stroke (can be remembered by BE FAST):  •Balance: Dizziness, loss of balance, or a sense of falling  •Eyes: Sudden double vision or blurred vision  •Face: drooping of one side of the face  •Arm: arm weakness  •Speech:  Sudden trouble talking or slurred speech, trouble understanding others  •Time: Time to call for an ambulance fast!   FOLLOW UP: Dr. Novak in 1 week. Your appointment has been made for _______. Call the office at  with any questions.    WOUND CARE: You may shower; soap and water over incision sites. Do not scrub. Pat dry when done.     ACTIVITY: Ambulate as tolerated, but no heavy lifting (>10lbs) or strenuous exercise. NO sharp neck turns. NO driving until you see surgeon in office. If you have a persistent headache that is not improved with Tylenol, please call MD.    Call the office if you experience increasing pain, redness, swelling or drainage from incision sites/wounds, or temperature >101.4F.     NEW MEDICATIONS:  CONTINUE TAKING: Aspirin, Plavix, Atorvastatin, and all other home medications as prescribed.    ADDITIONAL FOLLOW UP AFTER DISCHARGE: Follow up with your PCP and cardiologist per routine.    DISCHARGE DESTINATION: Home    DISCHARGE EDUCATION:    STROKE EDUCATION: Call 911 if you or someone you know experiences the following symptoms of stroke (can be remembered by BE FAST):  •Balance: Dizziness, loss of balance, or a sense of falling  •Eyes: Sudden double vision or blurred vision  •Face: drooping of one side of the face  •Arm: arm weakness  •Speech:  Sudden trouble talking or slurred speech, trouble understanding others  •Time: Time to call for an ambulance fast!   FOLLOW UP: Dr. Novak in 1 week. Your appointment has been made for _______. Call the office at  with any questions.    WOUND CARE: You may shower; soap and water over incision sites. Do not scrub. Pat dry when done.     ACTIVITY: Ambulate as tolerated, but no heavy lifting (>10lbs) or strenuous exercise. NO sharp neck turns. NO driving until you see surgeon in office. If you have a persistent headache that is not improved with Tylenol, please call MD.    Call the office if you experience increasing pain, redness, swelling or drainage from incision sites/wounds, or temperature >101.4F.     NEW MEDICATIONS: None    CONTINUE TAKING: Aspirin, Plavix, Atorvastatin, and all other home medications as prescribed.    ADDITIONAL FOLLOW UP AFTER DISCHARGE: Follow up with your PCP and cardiologist per routine.    DISCHARGE DESTINATION: Home    DISCHARGE EDUCATION: Yes    STROKE EDUCATION: Call 911 if you or someone you know experiences the following symptoms of stroke (can be remembered by BE FAST):  •Balance: Dizziness, loss of balance, or a sense of falling  •Eyes: Sudden double vision or blurred vision  •Face: drooping of one side of the face  •Arm: arm weakness  •Speech:  Sudden trouble talking or slurred speech, trouble understanding others  •Time: Time to call for an ambulance fast!   FOLLOW UP: Dr. Novak in 1 week. Your appointment has been made for 3/19 at 8:45 am. Call the office at  with any questions.    WOUND CARE: You may shower; soap and water over incision sites. Do not scrub. Pat dry when done.     ACTIVITY: Ambulate as tolerated, but no heavy lifting (>10lbs) or strenuous exercise. NO sharp neck turns. NO driving until you see surgeon in office. If you have a persistent headache that is not improved with Tylenol, please call MD.    Call the office if you experience increasing pain, redness, swelling or drainage from incision sites/wounds, or temperature >101.4F.     NEW MEDICATIONS: None    CONTINUE TAKING: Aspirin, Plavix, Atorvastatin, and all other home medications as prescribed.    ADDITIONAL FOLLOW UP AFTER DISCHARGE: Follow up with your PCP and cardiologist per routine.    DISCHARGE DESTINATION: Home    DISCHARGE EDUCATION: Yes    STROKE EDUCATION: Call 911 if you or someone you know experiences the following symptoms of stroke (can be remembered by BE FAST):  •Balance: Dizziness, loss of balance, or a sense of falling  •Eyes: Sudden double vision or blurred vision  •Face: drooping of one side of the face  •Arm: arm weakness  •Speech:  Sudden trouble talking or slurred speech, trouble understanding others  •Time: Time to call for an ambulance fast!

## 2024-03-13 NOTE — DISCHARGE NOTE PROVIDER - CARE PROVIDER_API CALL
Crystal Novak  Vascular Surgery  [address]  [place] [address]  Phone: [phone]  Fax: [phone]  Follow Up Time: 1 week

## 2024-03-13 NOTE — BRIEF OPERATIVE NOTE - OPERATION/FINDINGS
Left-sided transverse incision made 1 cm supraclavicularly between the sternal and clavicular heads of the SCM. Dissected down to proximal common carotid artery, isolated and controlled with umbilical tape. U-stitch secured at planned arteriotomy site. R CFV accessed with micropuncture technique and Enroute venous flow sheath secured. Patient was heparinized and common carotid accessed with direct stick technique with placement of Enroute 4 [place] microsheath. Carotid angiography revealed tight lesion of the bifurcation with no clear distinction between ECA and ICA so microsheath was kept in proximal CCA. Next the microsheath was dilated up with 5 [place] sheath and then exchanged for Enroute 8 [place] arterial sheath. Flow reversal was engaged, the lesion was predilated with 5x30mm ralph balloon and then stented with Enroute 8x30mm stent. Completion angiography with satisfactory result. Heparin was reversed with protamine and venous sheath was pulled with 15 minutes of manual pressure held. Carotid arteriotomy closed with pre-placed prolene u stitch and incision closed in layers with 2.0 vicryl and 4.0 monocryl suture.

## 2024-03-13 NOTE — PROGRESS NOTE ADULT - SUBJECTIVE AND OBJECTIVE BOX
Vascular Surgery Post-Op Note    Procedure: L TCAR    Diagnosis/Indication: L Carotid Stenosis    Surgeon: Dr. Machado    S: Pt with 9/10 incisional pain immediately post-op. Denies headaches, no focal neurologic deficits. Small debora-incisional hematoma/bruising.    O:  T(C): 36.6 (03-13-24 @ 13:40), Max: 36.6 (03-13-24 @ 13:40)  T(F): 97.8 (03-13-24 @ 13:40), Max: 97.8 (03-13-24 @ 13:40)  HR: 68 (03-13-24 @ 15:25) (65 - 69)  BP: 174/84 (03-13-24 @ 15:25) (128/63 - 174/84)  RR: 19 (03-13-24 @ 15:25) (12 - 25)  SpO2: 99% (03-13-24 @ 15:25) (99% - 100%)  Wt(kg): --                        9.3    7.32  )-----------( 217      ( 13 Mar 2024 13:49 )             28.7     03-13    142  |  110<H>  |  23  ----------------------------<  170<H>  4.1   |  21<L>  |  0.83    Ca    8.4      13 Mar 2024 13:49  Phos  3.5     03-13  Mg     1.8     03-13        Gen: Mildly uncomfortable due to pain  HEENT: small debora-incisional hematoma/bruising  C/V: NSR  Pulm: Nonlabored breathing, no respiratory distress  Abd: Groin soft, NT  Extrem: WWP, no calf edema, SCDs in place      A/P: 68yFemale s/p above procedure  Diet: NPO  IVF: NS @ 75 ccs  Pain/nausea control  DVT ppx: Hold SQH  Dispo plan: PACU 8 hours then 5Uris

## 2024-03-13 NOTE — DISCHARGE NOTE PROVIDER - NSDCCPTREATMENT_GEN_ALL_CORE_FT
PRINCIPAL PROCEDURE  Procedure: Transcarotid artery revascularization (TCAR) with intraoperative flow reversal  Findings and Treatment:

## 2024-03-13 NOTE — DISCHARGE NOTE PROVIDER - HOSPITAL COURSE
67yo F former smoker with pmhx of HTN, HLD, T2DM, mild claudication, CAD s/p stents, referred by Dr. Pablo Dominguez for evaluation of high-grade L ICA stenosis noted on screening duplex and confirmed on CTA neck. Patient denies any personal or family hx of CVA/TIA or any neurologic deficits or events in the past. Currently, she takes aspirin, plavix, and atorvastatin. Normal neuro exam today and carotid duplex performed in office to evaluate for ICA stenosis demonstrated 50-69% stenosis of R ICA and >70% stenosis of L ICA, with widely patent vertebral arteries with antegrade flow b/l. Patient now presents for L TCAR. 67yo F former smoker with pmhx of HTN, HLD, T2DM, mild claudication, CAD s/p stents, referred by Dr. Pablo Dominguez for evaluation of high-grade L ICA stenosis noted on screening duplex and confirmed on CTA neck. Patient denies any personal or family hx of CVA/TIA or any neurologic deficits or events in the past. She reports hx of "shooting pains" in her neck and subsequent b/l headaches for which she uses topical diclofenac and Nurtec. Currently, she takes aspirin, plavix, and atorvastatin for her coronary stents and states she is compliant with these medications. Normal neuro exam today and carotid duplex performed in office to evaluate for ICA stenosis demonstrated 50-69% stenosis of R ICA and >70% stenosis of L ICA, with widely patent vertebral arteries with antegrade flow b/l. Patient presented to [facility] for L TCAR. Patient now s/p L TCAR 3/13. 69yo F former smoker with pmhx of HTN, HLD, T2DM, mild claudication, CAD s/p stents, referred by Dr. Pablo Dominguez for evaluation of high-grade L ICA stenosis noted on screening duplex and confirmed on CTA neck. Patient denies any personal or family hx of CVA/TIA or any neurologic deficits or events in the past. She reports hx of "shooting pains" in her neck and subsequent b/l headaches for which she uses topical diclofenac and Nurtec, she has been told she has cervical radiculopathy. Currently, she takes aspirin, plavix, and atorvastatin for her coronary stents and states she is compliant with these medications. Normal neuro exam today and carotid duplex performed in office to evaluate for ICA stenosis demonstrated 50-69% stenosis of R ICA and >70% stenosis of L ICA, with widely patent vertebral arteries with antegrade flow b/l. Patient presented to [facility] for L TCAR. Patient now s/p L TCAR 3/13. Patient was monitored closely in PACU per carotid protocol and then transferred to telemetry. Today patient is feeling well, all the VS and blood work is within normal limits, the pain is well controlled on oral pain medication, tolerating diet without nausea, and ambulating independently - patient is stable and ready for discharge today.

## 2024-03-13 NOTE — DISCHARGE NOTE PROVIDER - NSDCCPCAREPLAN_GEN_ALL_CORE_FT
PRINCIPAL DISCHARGE DIAGNOSIS  Diagnosis: Carotid stenosis, asymptomatic, left  Assessment and Plan of Treatment:       SECONDARY DISCHARGE DIAGNOSES  Diagnosis: Hypertension  Assessment and Plan of Treatment:     Diagnosis: Hyperlipidemia  Assessment and Plan of Treatment:     Diagnosis: Diabetes mellitus  Assessment and Plan of Treatment:     Diagnosis: Diabetes mellitus with hyperglycemia  Assessment and Plan of Treatment:     Diagnosis: CAD (coronary artery disease)  Assessment and Plan of Treatment:      PRINCIPAL DISCHARGE DIAGNOSIS  Diagnosis: Carotid stenosis, asymptomatic, left  Assessment and Plan of Treatment:       SECONDARY DISCHARGE DIAGNOSES  Diagnosis: Hypertension  Assessment and Plan of Treatment:     Diagnosis: Hyperlipidemia  Assessment and Plan of Treatment:     Diagnosis: Diabetes mellitus  Assessment and Plan of Treatment:     Diagnosis: CAD (coronary artery disease)  Assessment and Plan of Treatment:     Diagnosis: Diabetes mellitus with hyperglycemia  Assessment and Plan of Treatment:     Diagnosis: Cervical radiculopathy  Assessment and Plan of Treatment:     Diagnosis: Migraines  Assessment and Plan of Treatment:

## 2024-03-13 NOTE — H&P ADULT - NSHPPOADEEPVENOUSTHROMB_GEN_A_CORE
no Female Completion Statement: After discussing her treatment course we decided to discontinue isotretinoin therapy at this time. I explained that she would need to continue her birth control methods for at least one month after the last dosage. She should also get a pregnancy test one month after the last dose. She shouldn't donate blood for one month after the last dose. She should call with any new symptoms of depression. Target Cumulative Dosage (In Mg/Kg): 135 Most Recent Lfts (Optional): baseline ALT-19, s/p 1- 20. baseline AST- 26, s/p1- 26 Show Text Field For Brand Names Of Contraception?: Yes Male Completion Statement: After discussing his treatment course we decided to discontinue isotretinoin therapy at this time. He shouldn't donate blood for one month after the last dose. He should call with any new symptoms of depression. Cheilitis Normal Treatment: I recommended application of Vaseline or Aquaphor numerous times a day (as often as every hour) and before going to bed. Headache Monitoring: I recommended monitoring the headaches for now. There is no evidence of increased intracranial pressure. They were instructed to call if the headaches are worsening. Hypercholesterolemia Monitoring: I explained this is common when taking isotretinoin. We will monitor closely. Retinoid Dermatitis Normal Treatment: I recommended more frequent application of Cetaphil or CeraVe to the areas of dermatitis. Xerosis Normal Treatment: I recommended application of Cetaphil or CeraVe numerous times a day going to bed to all dry areas. Nosebleeds Normal Treatment: I explained this is common when taking isotretinoin. I recommended saline mist in each nostril multiple times a day. If this worsens they will contact us. Use Enhanced Counseling Feature (Automatic): No Xerosis Aggressive Treatment: I recommended application of Cetaphil or CeraVe numerous times a day going to bed to all dry areas. I also prescribed a topical steroid for twice daily use. Cheilitis Aggressive Treatment: I recommended application of Vaseline or Aquaphor numerous times a day (as often as every hour) and before going to bed. I also prescribed a topical steroid for twice daily use. Detail Level: Zone Most Recent Triglycerides (Optional): baseline- 69, s/p1- 78 Retinoid Dermatitis Aggressive Treatment: I recommended more frequent application of Cetaphil or CeraVe to the areas of dermatitis. I also prescribed a topical steroid for twice daily use until the dermatitis resolves. Most Recent Cholesterol (Optional): baseline- 127, s/p 1- 123 What Is The Patient's Gender: Male Is Cheilitis Present?: Yes - Normal Treatment Myalgia Monitoring: I explained this is common when taking isotretinoin. If this worsens they will contact us. Xerosis Normal Treatment: I recommended application of Cetaphil or CeraVe numerous times a day and before going to bed to all dry areas. Myalgia Treatment: I explained this is common when taking isotretinoin. If this worsens they will contact us. They may try OTC ibuprofen. Are Labs Available For Review?: No- Pending Xerosis Aggressive Treatment: I recommended application of Cetaphil or CeraVe numerous times a day and before going to bed to all dry areas. I also prescribed a topical steroid for twice daily use. Next Month's Dosage: 40mg Daily Ipledge Number (Optional): [id number] Weight Units: kilograms Counseling Text: I reviewed the side effect in detail. Patient should get monthly blood tests, not donate blood, not drive at night if vision affected, and not share medication. Patient Weight (Optional But Required For Cumulative Dose-Numbers And Decimals Only): 47.8 Use Therapeutic Ranged Or Therapeutic Target: please select Range or Target Months Of Therapy Completed: 1 Female Pregnancy Counseling Text: Female patients should also be on two forms of birth control while taking this medication and for one month after their last dose. Lower Range (In Mg/Kg): 120 Pounds Preamble Statement (Weight Entered In Details Tab): Reported Weight in pounds: Upper Range (In Mg/Kg): 150 Dosing Month 1 (Required For Cumulative Dosing): 20mg Daily Kilograms Preamble Statement (Weight Entered In Details Tab): Reported Weight in kilograms:

## 2024-03-13 NOTE — H&P ADULT - NSHPPHYSICALEXAM_GEN_ALL_CORE
T(C): 36.6 (03-13-24 @ 13:40), Max: 36.6 (03-13-24 @ 13:40)  HR: 68 (03-13-24 @ 14:25) (66 - 96)  BP: 162/73 (03-13-24 @ 14:25) (128/63 - 178/79)  RR: 16 (03-13-24 @ 14:25) (14 - 25)  SpO2: 100% (03-13-24 @ 14:25) (98% - 100%)    CONSTITUTIONAL: Well groomed, no apparent distress  EYES: PERRLA and symmetric, EOMI, No conjunctival or scleral injection, non-icteric  ENMT: Oral mucosa with moist membranes. Normal dentition; no pharyngeal injection or exudates             NECK: Supple, symmetric and without tracheal deviation. TCAR incision dressed with dermabond.  RESP: No respiratory distress, no use of accessory muscles; CTA b/l, no WRR  CV: RRR, +S1S2, no MRG; no JVD; no peripheral edema  GI: Groin Soft, NT, no bruising, bleeding, hematoma  MSK: Normal gait  SKIN: Occipital subcutaneous nodule palpated  NEURO: CN II-XII intact; normal reflexes in upper and lower extremities, sensation intact in upper and lower extremities b/l to light touch   PSYCH: Appropriate insight/judgment; A+O x 3, mood and affect appropriate, recent/remote memory intact T(C): 36.6 (03-13-24 @ 13:40), Max: 36.6 (03-13-24 @ 13:40)  HR: 68 (03-13-24 @ 14:25) (66 - 96)  BP: 162/73 (03-13-24 @ 14:25) (128/63 - 178/79)  RR: 16 (03-13-24 @ 14:25) (14 - 25)  SpO2: 100% (03-13-24 @ 14:25) (98% - 100%)    CONSTITUTIONAL: Well groomed, no apparent distress  EYES: PERRLA and symmetric, EOMI, No conjunctival or scleral injection, non-icteric  ENMT: Oral mucosa with moist membranes. Normal dentition; no pharyngeal injection or exudates             NECK: Supple, symmetric and without tracheal deviation. TCAR incision dressed with dermabond. No debora-incisional bleeding/hematoma   RESP: No respiratory distress, no use of accessory muscles; CTA b/l, no WRR  CV: RRR, +S1S2, no MRG; no JVD; no peripheral edema  GI: Groin Soft, NT, no bruising, bleeding, hematoma  MSK: Normal gait  SKIN: Occipital subcutaneous nodule palpated  NEURO: CN II-XII intact; normal reflexes in upper and lower extremities, sensation intact in upper and lower extremities b/l to light touch   PSYCH: Appropriate insight/judgment; A+O x 3, mood and affect appropriate, recent/remote memory intact

## 2024-03-13 NOTE — H&P ADULT - NSHPREVIEWOFSYSTEMS_GEN_ALL_CORE
REVIEW OF SYSTEMS:    CONSTITUTIONAL: No weakness, fevers or chills  EYES/ENT: No visual changes;  No vertigo or throat pain   NECK: Mild neck stiffness/spasm pre-op  RESPIRATORY: No cough, wheezing, hemoptysis; No shortness of breath  CARDIOVASCULAR: No chest pain or palpitations  GASTROINTESTINAL: No abdominal or epigastric pain. No nausea, vomiting, or hematemesis; No diarrhea or constipation. No melena or hematochezia.  GENITOURINARY: No dysuria, frequency or hematuria  NEUROLOGICAL: No numbness or weakness  SKIN: No itching, rashes

## 2024-03-13 NOTE — H&P ADULT - ASSESSMENT
68yoF w/PMHx of HTN, HLD, Type 2 DM, mild claudication, CAD s/p PTCA w/stents (last PTCA a few months prior), previous smoking hx (quit 9y prior), Initially referred to outpatient office by  Dr. Pablo Dominguez for evaluation of her high-grade L ICA stenosis now s/p left-sided transcarotid artery revascularization with intra-operative flow reversal, balloon angioplasty and stenting. Admitted to inpatient tele for post-op neuro and hemodynamic monitoring.  A/P: 68y YO Female      Carotid Stenosis:    -S/P TCAR for L carotid >70% stenosis on duplex        -Neurochecks q15 1 hr, q30 2 hours, q1hr 5 hours        -Marie-op Ancef x 2 doses        -NPO then adv to CLD in 4 hours post-op        -IVF @ 75 ccs        -No escobedo, TOV @ 8:30 pm        HTN/HLD:   -C/W atorvastatin 80 mg QD   -C/W amlodipine 5 mg QD   -Holding Lasix    CAD/CHF:     -Given recent cardiac cath and stenting, cleared by cardiology for intermediate risk procedure.    -C/W DAPT w/ ASA+Plavix    -Holding Lasix        DM:    -Most recent A1C 6.2    -Metformin Dc'd on 2/18, currently on no oral diabetes meds.        Diet: NPO      DVT PPX: Hold SQH, SCDs.    Dispo: 5uris after 8 hours PACU             A/P:  68yoF w/PMHx of HTN, HLD, Type 2 DM, mild claudication, CAD s/p PTCA w/stents (last PTCA a few months prior), previous smoking hx (quit 9y prior), Initially referred to outpatient office by  Dr. Pablo Dominguez for evaluation of her high-grade L ICA stenosis now s/p left-sided transcarotid artery revascularization with intra-operative flow reversal, balloon angioplasty and stenting. Admitted to inpatient tele for post-op neuro and hemodynamic monitoring.        Carotid Stenosis:    -S/P TCAR for L carotid >70% stenosis on duplex        -Neurochecks q15 1 hr, q30 2 hours, q1hr 5 hours        -Marie-op Ancef x 2 doses        -NPO then adv to CLD in 4 hours post-op        -IVF @ 75 ccs        -No escobedo, TOV @ 8:30 pm        HTN/HLD:   -C/W atorvastatin 80 mg QD   -C/W amlodipine 5 mg QD   -Holding Lasix    CAD/CHF:     -Given recent cardiac cath and stenting, cleared by cardiology for intermediate risk procedure.    -C/W DAPT w/ ASA+Plavix    -Holding Lasix        DM:    -Most recent A1C 6.2    -Metformin Dc'd on 2/18, currently on no oral diabetes meds.        Diet: NPO      DVT PPX: Hold SQH, SCDs.    Dispo: 5uris after 8 hours PACU             A/P:  68yoF w/PMHx of HTN, HLD, Type 2 DM, mild claudication, CAD s/p PTCA w/stents (last PTCA a few months prior), previous smoking hx (quit 9y prior), Initially referred to outpatient office by  Dr. Pablo Dominguez for evaluation of her high-grade L ICA stenosis now s/p left-sided transcarotid artery revascularization with intra-operative flow reversal, balloon angioplasty and stenting. Admitted to inpatient tele for post-op neuro and hemodynamic monitoring.        Carotid Stenosis:    -S/P TCAR for L carotid >70% stenosis on duplex        -Neurochecks q15 1 hr, q30 2 hours, q1hr 5 hours        -Marie-op Ancef x 2 doses        -NPO then adv to CLD in 4 hours post-op        -IVF @ 75 ccs        -No escobedo, TOV @ 8:30 pm  - C/W aspirin, plavix, and atorvastatin    HTN/HLD:   -C/W atorvastatin 80 mg QD   -C/W amlodipine 5 mg QD   -Holding Lasix    CAD    -Given recent cardiac cath and stenting, cleared by cardiology for intermediate risk procedure.    -C/W DAPT w/ ASA+Plavix    -Holding Lasix      DM:    -Most recent A1C 6.2    -Metformin Dc'd on 2/18, currently on no oral diabetes meds.        Diet: NPO  Activity: Bedrest  DVT PPX: Hold SQH  Dispo: 5uris after 8 hours PACU

## 2024-03-13 NOTE — DISCHARGE NOTE PROVIDER - NSDCMRMEDTOKEN_GEN_ALL_CORE_FT
acetaminophen 325 mg oral tablet: 2 tab(s) orally every 6 hours As needed Moderate Pain (4 - 6)  albuterol 90 mcg/inh inhalation aerosol: 2 puff(s) inhaled 4 times a day, As Needed  amLODIPine 5 mg oral tablet: 1 tab(s) orally once a day  Aspirin Enteric Coated 81 mg oral delayed release tablet: 1 tab(s) orally once a day   atorvastatin 80 mg oral tablet: 1 tab(s) orally once a day (at bedtime)  clopidogrel 75 mg oral tablet: 1 tab(s) orally once a day  Creon: 2 tab(s) orally once a day  Creon: 1 tab(s) orally once a day (at bedtime)  diclofenac 1% topical gel: Apply topically to affected area PRN  diclofenac potassium 25 mg oral tablet: 2 tab(s) orally PRN  Lasix 20 mg oral tablet: 1 tab(s) orally every other day  NexIUM 40 mg oral delayed release capsule: 1 cap(s) orally once a day  Nurtec ODT 75 mg oral tablet, disintegratin tab(s) orally PRN  Wellbutrin  mg/24 hours oral tablet, extended release: 1 tab(s) orally every 24 hours   albuterol 90 mcg/inh inhalation aerosol: 2 puff(s) inhaled 4 times a day, As Needed  amLODIPine 5 mg oral tablet: 1 tab(s) orally once a day  Aspirin Enteric Coated 81 mg oral delayed release tablet: 1 tab(s) orally once a day   atorvastatin 80 mg oral tablet: 1 tab(s) orally once a day (at bedtime)  clopidogrel 75 mg oral tablet: 1 tab(s) orally once a day  Creon: 2 tab(s) orally once a day  Creon: 1 tab(s) orally once a day (at bedtime)  diclofenac 1% topical gel: Apply topically to affected area PRN  Lasix 20 mg oral tablet: 1 tab(s) orally every other day  NexIUM 40 mg oral delayed release capsule: 1 cap(s) orally once a day  Nurtec ODT 75 mg oral tablet, disintegratin tab(s) orally PRN  Wellbutrin  mg/24 hours oral tablet, extended release: 1 tab(s) orally every 24 hours   albuterol 90 mcg/inh inhalation aerosol: 2 puff(s) inhaled 4 times a day, As Needed  amLODIPine 5 mg oral tablet: 1 tab(s) orally once a day  Aspirin Enteric Coated 81 mg oral delayed release tablet: 1 tab(s) orally once a day   Creon: 1 tab(s) orally once a day (at bedtime)  diclofenac 1% topical gel: Apply topically to affected area PRN  Lasix 20 mg oral tablet: 1 tab(s) orally every other day  NexIUM 40 mg oral delayed release capsule: 1 cap(s) orally once a day  Nurtec ODT 75 mg oral tablet, disintegratin tab(s) orally PRN  Wellbutrin  mg/24 hours oral tablet, extended release: 1 tab(s) orally every 24 hours

## 2024-03-13 NOTE — BRIEF OPERATIVE NOTE - NSICDXBRIEFPROCEDURE_GEN_ALL_CORE_FT
PROCEDURES:  Transcarotid artery revascularization (TCAR) with intraoperative flow reversal 13-Mar-2024 13:30:53  Jose Burns

## 2024-03-14 ENCOUNTER — TRANSCRIPTION ENCOUNTER (OUTPATIENT)
Age: 69
End: 2024-03-14

## 2024-03-14 VITALS
HEART RATE: 72 BPM | SYSTOLIC BLOOD PRESSURE: 113 MMHG | RESPIRATION RATE: 17 BRPM | DIASTOLIC BLOOD PRESSURE: 59 MMHG | OXYGEN SATURATION: 97 %

## 2024-03-14 PROBLEM — I25.10 ATHEROSCLEROTIC HEART DISEASE OF NATIVE CORONARY ARTERY WITHOUT ANGINA PECTORIS: Chronic | Status: ACTIVE | Noted: 2017-02-24

## 2024-03-14 PROBLEM — I50.32 CHRONIC DIASTOLIC (CONGESTIVE) HEART FAILURE: Chronic | Status: ACTIVE | Noted: 2017-02-24

## 2024-03-14 PROBLEM — Z87.898 PERSONAL HISTORY OF OTHER SPECIFIED CONDITIONS: Chronic | Status: ACTIVE | Noted: 2024-03-12

## 2024-03-14 LAB
A1C WITH ESTIMATED AVERAGE GLUCOSE RESULT: 6.5 % — HIGH (ref 4–5.6)
ANION GAP SERPL CALC-SCNC: 10 MMOL/L — SIGNIFICANT CHANGE UP (ref 5–17)
BUN SERPL-MCNC: 18 MG/DL — SIGNIFICANT CHANGE UP (ref 7–23)
CALCIUM SERPL-MCNC: 8.4 MG/DL — SIGNIFICANT CHANGE UP (ref 8.4–10.5)
CHLORIDE SERPL-SCNC: 106 MMOL/L — SIGNIFICANT CHANGE UP (ref 96–108)
CO2 SERPL-SCNC: 22 MMOL/L — SIGNIFICANT CHANGE UP (ref 22–31)
CREAT SERPL-MCNC: 0.76 MG/DL — SIGNIFICANT CHANGE UP (ref 0.5–1.3)
EGFR: 85 ML/MIN/1.73M2 — SIGNIFICANT CHANGE UP
ESTIMATED AVERAGE GLUCOSE: 140 MG/DL — HIGH (ref 68–114)
GLUCOSE BLDC GLUCOMTR-MCNC: 113 MG/DL — HIGH (ref 70–99)
GLUCOSE BLDC GLUCOMTR-MCNC: 212 MG/DL — HIGH (ref 70–99)
GLUCOSE SERPL-MCNC: 117 MG/DL — HIGH (ref 70–99)
HCT VFR BLD CALC: 29.4 % — LOW (ref 34.5–45)
HGB BLD-MCNC: 9.3 G/DL — LOW (ref 11.5–15.5)
MAGNESIUM SERPL-MCNC: 2.2 MG/DL — SIGNIFICANT CHANGE UP (ref 1.6–2.6)
MCHC RBC-ENTMCNC: 26.8 PG — LOW (ref 27–34)
MCHC RBC-ENTMCNC: 31.6 GM/DL — LOW (ref 32–36)
MCV RBC AUTO: 84.7 FL — SIGNIFICANT CHANGE UP (ref 80–100)
NRBC # BLD: 0 /100 WBCS — SIGNIFICANT CHANGE UP (ref 0–0)
PHOSPHATE SERPL-MCNC: 2.9 MG/DL — SIGNIFICANT CHANGE UP (ref 2.5–4.5)
PLATELET # BLD AUTO: 228 K/UL — SIGNIFICANT CHANGE UP (ref 150–400)
POTASSIUM SERPL-MCNC: 3.8 MMOL/L — SIGNIFICANT CHANGE UP (ref 3.5–5.3)
POTASSIUM SERPL-SCNC: 3.8 MMOL/L — SIGNIFICANT CHANGE UP (ref 3.5–5.3)
RBC # BLD: 3.47 M/UL — LOW (ref 3.8–5.2)
RBC # FLD: 16 % — HIGH (ref 10.3–14.5)
SODIUM SERPL-SCNC: 138 MMOL/L — SIGNIFICANT CHANGE UP (ref 135–145)
WBC # BLD: 11.08 K/UL — HIGH (ref 3.8–10.5)
WBC # FLD AUTO: 11.08 K/UL — HIGH (ref 3.8–10.5)

## 2024-03-14 PROCEDURE — 76000 FLUOROSCOPY <1 HR PHYS/QHP: CPT

## 2024-03-14 PROCEDURE — C9399: CPT

## 2024-03-14 PROCEDURE — 85347 COAGULATION TIME ACTIVATED: CPT

## 2024-03-14 PROCEDURE — 86850 RBC ANTIBODY SCREEN: CPT

## 2024-03-14 PROCEDURE — C1894: CPT

## 2024-03-14 PROCEDURE — 86900 BLOOD TYPING SEROLOGIC ABO: CPT

## 2024-03-14 PROCEDURE — 85730 THROMBOPLASTIN TIME PARTIAL: CPT

## 2024-03-14 PROCEDURE — 80048 BASIC METABOLIC PNL TOTAL CA: CPT

## 2024-03-14 PROCEDURE — 93005 ELECTROCARDIOGRAM TRACING: CPT

## 2024-03-14 PROCEDURE — C1725: CPT

## 2024-03-14 PROCEDURE — 84100 ASSAY OF PHOSPHORUS: CPT

## 2024-03-14 PROCEDURE — 83036 HEMOGLOBIN GLYCOSYLATED A1C: CPT

## 2024-03-14 PROCEDURE — 86901 BLOOD TYPING SEROLOGIC RH(D): CPT

## 2024-03-14 PROCEDURE — C1884: CPT

## 2024-03-14 PROCEDURE — 85610 PROTHROMBIN TIME: CPT

## 2024-03-14 PROCEDURE — C1889: CPT

## 2024-03-14 PROCEDURE — C1769: CPT

## 2024-03-14 PROCEDURE — 36415 COLL VENOUS BLD VENIPUNCTURE: CPT

## 2024-03-14 PROCEDURE — 99223 1ST HOSP IP/OBS HIGH 75: CPT

## 2024-03-14 PROCEDURE — 83735 ASSAY OF MAGNESIUM: CPT

## 2024-03-14 PROCEDURE — C1876: CPT

## 2024-03-14 PROCEDURE — 82962 GLUCOSE BLOOD TEST: CPT

## 2024-03-14 PROCEDURE — 85027 COMPLETE CBC AUTOMATED: CPT

## 2024-03-14 RX ORDER — ACETAMINOPHEN 500 MG
1000 TABLET ORAL ONCE
Refills: 0 | Status: COMPLETED | OUTPATIENT
Start: 2024-03-14 | End: 2024-03-14

## 2024-03-14 RX ORDER — LIPASE/PROTEASE/AMYLASE 16-48-48K
1 CAPSULE,DELAYED RELEASE (ENTERIC COATED) ORAL
Qty: 0 | Refills: 0 | DISCHARGE

## 2024-03-14 RX ORDER — ATORVASTATIN CALCIUM 80 MG/1
1 TABLET, FILM COATED ORAL
Qty: 0 | Refills: 0 | DISCHARGE
Start: 2024-03-14

## 2024-03-14 RX ORDER — CLOPIDOGREL BISULFATE 75 MG/1
1 TABLET, FILM COATED ORAL
Qty: 0 | Refills: 0 | DISCHARGE
Start: 2024-03-14

## 2024-03-14 RX ORDER — LIPASE/PROTEASE/AMYLASE 16-48-48K
2 CAPSULE,DELAYED RELEASE (ENTERIC COATED) ORAL
Qty: 0 | Refills: 0 | DISCHARGE

## 2024-03-14 RX ORDER — ATORVASTATIN CALCIUM 80 MG/1
1 TABLET, FILM COATED ORAL
Refills: 0 | DISCHARGE

## 2024-03-14 RX ORDER — CLOPIDOGREL BISULFATE 75 MG/1
1 TABLET, FILM COATED ORAL
Refills: 0 | DISCHARGE

## 2024-03-14 RX ADMIN — PANTOPRAZOLE SODIUM 40 MILLIGRAM(S): 20 TABLET, DELAYED RELEASE ORAL at 06:10

## 2024-03-14 RX ADMIN — Medication 1000 MILLIGRAM(S): at 01:59

## 2024-03-14 RX ADMIN — AMLODIPINE BESYLATE 5 MILLIGRAM(S): 2.5 TABLET ORAL at 06:10

## 2024-03-14 RX ADMIN — Medication 1000 MILLIGRAM(S): at 10:01

## 2024-03-14 RX ADMIN — Medication 400 MILLIGRAM(S): at 09:46

## 2024-03-14 RX ADMIN — Medication 100 MILLIGRAM(S): at 03:56

## 2024-03-14 RX ADMIN — CLOPIDOGREL BISULFATE 75 MILLIGRAM(S): 75 TABLET, FILM COATED ORAL at 07:40

## 2024-03-14 RX ADMIN — Medication 81 MILLIGRAM(S): at 07:40

## 2024-03-14 RX ADMIN — BUPROPION HYDROCHLORIDE 150 MILLIGRAM(S): 150 TABLET, EXTENDED RELEASE ORAL at 09:46

## 2024-03-14 RX ADMIN — Medication 400 MILLIGRAM(S): at 00:59

## 2024-03-14 RX ADMIN — BENZOCAINE AND MENTHOL 1 LOZENGE: 5; 1 LIQUID ORAL at 01:12

## 2024-03-14 NOTE — CONSULT NOTE ADULT - ASSESSMENT
68yoF w/PMHx of HTN, HLD, Type 2 DM, mild claudication, CAD s/p PTCA w/stents (last PTCA a few months prior), previous smoking hx (quit 9y prior) s/p left-sided transcarotid artery revascularization.    Carotid stenosis  S/p TCAR  Management per primary team    HTN  CAD s/p stents   Home Amlodipine resumed  BP at goal, continue to monitor   Continue ASA/Clopidogrel if no contraindication     Normocytic anemia  No signs of bleeding, will need age appropriate screening with PCP    Leucocytosis  Probably reactive, monitor    Chronic migraine   Home regimen continued    Intermittent asthma  Continue Albuterol prn, IS, OOB     DVT ppx: per primary team  68yoF w/PMHx of HTN, HLD, Type 2 DM, mild claudication, CAD s/p PTCA w/stents (last PTCA a few months prior), previous smoking hx (quit 9y prior) s/p left-sided transcarotid artery revascularization.    Carotid stenosis  S/p TCAR  Management per primary team    HTN  CAD s/p stents   Chronic HFpEF  Home Amlodipine resumed  BP at goal, continue to monitor   Continue ASA/Clopidogrel if no contraindication     DM II  Off Metformin  Monitor FS, ISS     Normocytic anemia  No signs of bleeding, will need age appropriate screening with PCP    Leucocytosis  Probably reactive, monitor    Chronic migraine   Home regimen continued    Intermittent asthma  Continue Albuterol prn, IS, OOB     DVT ppx: per primary team

## 2024-03-14 NOTE — PROGRESS NOTE ADULT - SUBJECTIVE AND OBJECTIVE BOX
O/N: iv tylenol 1x. adv diet, pass TOV.               ---------------------------------------------------------------------------  PLEASE CHECK WHEN PRESENT:     [  ]Heart Failure     [  ] Acute     [  ] Acute on Chronic     [  ] Chronic  -------------------------------------------------------------------     [  ]Diastolic [HFpEF]     [  ]Systolic [HFrEF]     [  ]Combined [HFpEF & HFrEF]  .................................................................................     [  ]Other:     [ ] Pulmonary Hypertension     [ ] Chronic A-fib     [ ] Persistet A-fib     [ ] Permanent A-fib     [ ] Paroxysmal A-fib     [ ] Hypertensive Heart Disease  -------------------------------------------------------------------  [ ] Respiratory failure  [ ] Acute cor pulmonale  [ ] Asthma/COPD Exacerbation  [ ]COPD on home O2 (Chronic renal Failure)   [ ] Pleural effusion  [ ] Aspiration pneumonia  [ ] Obstructive Sleep Apnea  [ ]Atelectasis   [ ] Acute PE   -------------------------------------------------------------------  [  ]Acute Kidney Injury      [  ]Acute Tubular Necrosis      [  ]Reneal Medullary Necrosis     [  ]Renal Cortical Necrosis     [  ]Other Pathological Lesions:    [  ]CKD 1  [  ]CKD 2  [  ]CKD 3  [  ]CKD 4  [  ]CKD 5 (ESRD)  [  ]Other  -------------------------------------------------------------------  [  ]Diabetes  [  ] Diabetic PVD Ulcer  [  ] Neuropathic ulcer to DM  [  ] Diabetes with Nephropathy  [  ] Osteomyelitis due to diabetes  [  ] Hyperglycemia   [  ]hypoglycemia   --------------------------------------------------------------------  [  ]Malnutrition: See Nutrition Note  [  ]Cachexia  [  ]Other:   [  ]Supplement Ordered:  [  ]Morbid Obesity (BMI >=40]  [ ] Ileus  ---------------------------------------------------------------------  [ ] Sepsis/severe sepsis/septic shock  [ ] Noninfectious SIRS  [ ] UTI  [ ] Pneumonia  [ ] Thrombophlebitis     -----------------------------------------------------------------------  [ ] Acidosis/alkalosis  [ ] Fluid overload  [ ] Hypokalemia  [ ] Hyperkalemia  [ ] Hypomagnesemia  [ ] Hypophosphatemia  [ ] Hyperphosphatemia  ------------------------------------------------------------------------  [ ] Acute blood loss anemia  [ ] Post op blood loss anemia  [ ] Iron deficiency anemia  [ ] Anemia due to chronic disease  [ ] Hypercoagulable state  [ ] Thrombocytopenia  ----------------------------------------------------------------------  [ ] Cerebral infarction  [ ] Transient ischemia attack  [ ] Encephalopathy - Toxic or Metabolic          Assessment:  · Assessment	    A/P:  68yoF w/PMHx of HTN, HLD, Type 2 DM, mild claudication, CAD s/p PTCA w/stents (last PTCA a few months prior), previous smoking hx (quit 9y prior), Initially referred to outpatient office by  Dr. Pablo Dominguez for evaluation of her high-grade L ICA stenosis now s/p left-sided transcarotid artery revascularization with intra-operative flow reversal, balloon angioplasty and stenting. Admitted to inpatient tele for post-op neuro and hemodynamic monitoring.        Carotid Stenosis:    -S/P TCAR for L carotid >70% stenosis on duplex        -Neurochecks q15 1 hr, q30 2 hours, q1hr 5 hours        -Marie-op Ancef x 2 doses        -NPO then adv to CLD in 4 hours post-op        -IVF @ 75 ccs        -No gregg TOV @ 8:30 pm  - C/W aspirin, plavix, and atorvastatin    HTN/HLD:   -C/W atorvastatin 80 mg QD   -C/W amlodipine 5 mg QD   -Holding Lasix    CAD    -Given recent cardiac cath and stenting, cleared by cardiology for intermediate risk procedure.    -C/W DAPT w/ ASA+Plavix    -Holding Lasix      DM:    -Most recent A1C 6.2    -Metformin Dc'd on 2/18, currently on no oral diabetes meds.        Diet: NPO  Activity: Bedrest  DVT PPX: Hold SQH  Dispo: 5uris after 8 hours PACU   O/N: iv tylenol 1x. adv diet, pass TOV.   Subjective: Patient seen overnight, cites mild incisional pain and radiculopathic cervical pain that is better after patient took home nurtec.          ROS:   Denies Headache, blurred vision, Chest Pain, SOB, Abdominal pain, nausea or vomiting     Social   amLODIPine   Tablet 5  aspirin enteric coated 81  clopidogrel Tablet 75      Allergies    morphine (Other)  metoprolol (Unknown)  codeine (Anaphylaxis)  Biaxin (Vomiting)  penicillin (Hives)  latex (Other; Pruritus)    Intolerances        Vital Signs Last 24 Hrs  T(C): 36.7 (13 Mar 2024 21:45), Max: 36.7 (13 Mar 2024 18:55)  T(F): 98.1 (13 Mar 2024 21:45), Max: 98.1 (13 Mar 2024 18:55)  HR: 94 (14 Mar 2024 02:34) (65 - 96)  BP: 120/57 (14 Mar 2024 02:34) (116/79 - 174/84)  BP(mean): 82 (14 Mar 2024 02:34) (80 - 133)  RR: 18 (14 Mar 2024 02:34) (12 - 40)  SpO2: 98% (14 Mar 2024 02:34) (96% - 100%)    Parameters below as of 14 Mar 2024 02:34  Patient On (Oxygen Delivery Method): room air      I&O's Summary    13 Mar 2024 07:01  -  14 Mar 2024 06:42  --------------------------------------------------------  IN: 1200 mL / OUT: 675 mL / NET: 525 mL        Physical Exam:  CONSTITUTIONAL: Well groomed, no apparent distress  EYES: PERRLA and symmetric, EOMI, No conjunctival or scleral injection, non-icteric  ENMT: Oral mucosa with moist membranes. Normal dentition; no pharyngeal injection or exudates             NECK: Supple, symmetric and without tracheal deviation. TCAR incision dressed with dermabond. No debora-incisional bleeding/hematoma   RESP: No respiratory distress, no use of accessory muscles; CTA b/l, no WRR  CV: RRR, +S1S2, no MRG; no JVD; no peripheral edema  GI: Groin Soft, NT, no bruising, bleeding, hematoma  MSK: Normal gait  SKIN: Occipital subcutaneous nodule palpated  NEURO: CN II-XII intact; normal reflexes in upper and lower extremities, sensation intact in upper and lower extremities b/l to light touch   PSYCH: Appropriate insight/judgment; A+O x 3, mood and affect appropriate, recent/remote memory       LABS:                        9.3    7.32  )-----------( 217      ( 13 Mar 2024 13:49 )             28.7     03-13    142  |  110<H>  |  23  ----------------------------<  170<H>  4.1   |  21<L>  |  0.83    Ca    8.4      13 Mar 2024 13:49  Phos  3.5     03-13  Mg     1.8     03-13      PT/INR - ( 13 Mar 2024 13:49 )   PT: 11.1 sec;   INR: 0.97          PTT - ( 13 Mar 2024 13:49 )  PTT:30.6 sec            ---------------------------------------------------------------------------  PLEASE CHECK WHEN PRESENT:     [  ]Heart Failure     [  ] Acute     [  ] Acute on Chronic     [  ] Chronic  -------------------------------------------------------------------     [  ]Diastolic [HFpEF]     [  ]Systolic [HFrEF]     [  ]Combined [HFpEF & HFrEF]  .................................................................................     [  ]Other:     [ ] Pulmonary Hypertension     [ ] Chronic A-fib     [ ] Persistet A-fib     [ ] Permanent A-fib     [ ] Paroxysmal A-fib     [ ] Hypertensive Heart Disease  -------------------------------------------------------------------  [ ] Respiratory failure  [ ] Acute cor pulmonale  [ ] Asthma/COPD Exacerbation  [ ]COPD on home O2 (Chronic renal Failure)   [ ] Pleural effusion  [ ] Aspiration pneumonia  [ ] Obstructive Sleep Apnea  [ ]Atelectasis   [ ] Acute PE   -------------------------------------------------------------------  [  ]Acute Kidney Injury      [  ]Acute Tubular Necrosis      [  ]Reneal Medullary Necrosis     [  ]Renal Cortical Necrosis     [  ]Other Pathological Lesions:    [  ]CKD 1  [  ]CKD 2  [  ]CKD 3  [  ]CKD 4  [  ]CKD 5 (ESRD)  [  ]Other  -------------------------------------------------------------------  [  ]Diabetes  [  ] Diabetic PVD Ulcer  [  ] Neuropathic ulcer to DM  [  ] Diabetes with Nephropathy  [  ] Osteomyelitis due to diabetes  [  ] Hyperglycemia   [  ]hypoglycemia   --------------------------------------------------------------------  [  ]Malnutrition: See Nutrition Note  [  ]Cachexia  [  ]Other:   [  ]Supplement Ordered:  [  ]Morbid Obesity (BMI >=40]  [ ] Ileus  ---------------------------------------------------------------------  [ ] Sepsis/severe sepsis/septic shock  [ ] Noninfectious SIRS  [ ] UTI  [ ] Pneumonia  [ ] Thrombophlebitis     -----------------------------------------------------------------------  [ ] Acidosis/alkalosis  [ ] Fluid overload  [ ] Hypokalemia  [ ] Hyperkalemia  [ ] Hypomagnesemia  [ ] Hypophosphatemia  [ ] Hyperphosphatemia  ------------------------------------------------------------------------  [ ] Acute blood loss anemia  [ ] Post op blood loss anemia  [ ] Iron deficiency anemia  [ ] Anemia due to chronic disease  [ ] Hypercoagulable state  [ ] Thrombocytopenia  ----------------------------------------------------------------------  [ ] Cerebral infarction  [ ] Transient ischemia attack  [ ] Encephalopathy - Toxic or Metabolic          Assessment:  · Assessment	    A/P:  68yoF w/PMHx of HTN, HLD, Type 2 DM, mild claudication, CAD s/p PTCA w/stents (last PTCA a few months prior), previous smoking hx (quit 9y prior), Initially referred to outpatient office by  Dr. Pablo Dominguez for evaluation of her high-grade L ICA stenosis now s/p left-sided transcarotid artery revascularization with intra-operative flow reversal, balloon angioplasty and stenting. Admitted to inpatient tele for post-op neuro and hemodynamic monitoring.        Carotid Stenosis:    -S/P TCAR for L carotid >70% stenosis on duplex    - C/W aspirin, plavix, and atorvastatin    HTN/HLD:   -C/W atorvastatin 80 mg QD   -C/W amlodipine 5 mg QD   -Holding Lasix    CAD    -Given recent cardiac cath and stenting, cleared by cardiology for intermediate risk procedure.    -C/W DAPT w/ ASA+Plavix    -Holding Lasix      DM:    -Most recent A1C 6.2    -Metformin Dc'd on 2/18, currently on no oral diabetes meds.        Diet: Dash/TLC  Activity: Ambulate as tolerated  DVT PPX: Ambulate  Dispo: Home

## 2024-03-14 NOTE — REASON FOR VISIT
[de-identified] : L ICA PTA/stent w/TCAR [de-identified] : 6 [de-identified] : 03/13/2024 [de-identified] : 68yoF w/asymptomatic high-gride L ICA stenosis returns for POC after her L ICA PTA/stent w/TCAR.  Pt reports no new post-op issues, denies headaches, visual disturbances, pain at operative sites.  She is back on all home meds w/o issue.

## 2024-03-14 NOTE — DISCUSSION/SUMMARY
[FreeTextEntry1] : 68yoF w/asymptomatic high-gride L ICA stenosis returns for POC after her L ICA PTA/stent w/TCAR.  Pt reports no new post-op issues, denies headaches, visual disturbances, pain at operative sites.  She is back on all home meds w/o issue.  Normal neuro exam noted today and carotid duplex performed to reevaluate ICAs for stenosis demonstrates...

## 2024-03-14 NOTE — CONSULT NOTE ADULT - TIME BILLING
Bedside exam and interview   Chart review  Reviewed vitals, labs   Discussed patient's plan of care with primary team   Documentation of encounter

## 2024-03-14 NOTE — DISCHARGE NOTE NURSING/CASE MANAGEMENT/SOCIAL WORK - PATIENT PORTAL LINK FT
You can access the FollowMyHealth Patient Portal offered by [facility] by registering at the following website: [email or web address]. By joining [employer]’s FollowMyHealth portal, you will also be able to view your health information using other applications (apps) compatible with our system.

## 2024-03-14 NOTE — PHYSICAL EXAM
[JVD] : no jugular venous distention  [Carotid Bruits] : carotid bruit  [Normal Breath Sounds] : Normal breath sounds [Respiratory Effort] : normal respiratory effort [Normal Rate and Rhythm] : normal rate and rhythm [Normal Heart Sounds] : normal heart sounds [Right Carotid Bruit] : right carotid bruit heard [Left Carotid Bruit] : left carotid bruit heard [2+] : left 2+ [Ankle Swelling (On Exam)] : not present [Varicose Veins Of Lower Extremities] : not present [] : not present [Abdomen Masses] : No abdominal masses [Abdomen Tenderness] : ~T ~M No abdominal tenderness [No Rash or Lesion] : No rash or lesion [Purpura] : no purpura  [Petechiae] : no petechiae [Skin Induration] : no induration [Skin Ulcer] : no ulcer [Alert] : alert [Calm] : calm [de-identified] : Healthy, NAD [de-identified] : NC/AT, noraictrafa, EOMIx6 [de-identified] : FROM throughout, strength 5/5x4, NEG Romberg/Pronator drift [de-identified] : Surgical sites well-healed [de-identified] : CNII-XII grossly intact

## 2024-03-14 NOTE — ADDENDUM
[FreeTextEntry1] : This note was written by Omega See, acting as a scribe for Dr. Crystal Novak.  I, Dr. Crystal Novak, have read and attest that all the information, medical decision-making, and discharge instructions within are true and accurate.  I, Dr. Crystal Novak, personally performed the evaluation and management (E/M) services for this established patient who presents today with (an) existing condition(s).  That E/M includes conducting the examination, assessing all conditions, and (re)establishing/reinforcing a plan of care.  Today, my ACP, Omega See, was here to observe my evaluation and management services for this condition to be followed going forward.

## 2024-03-16 PROBLEM — F41.9 ANXIETY DISORDER, UNSPECIFIED: Chronic | Status: ACTIVE | Noted: 2017-02-24

## 2024-03-16 PROBLEM — M19.90 UNSPECIFIED OSTEOARTHRITIS, UNSPECIFIED SITE: Chronic | Status: ACTIVE | Noted: 2024-03-12

## 2024-03-19 ENCOUNTER — APPOINTMENT (OUTPATIENT)
Dept: VASCULAR SURGERY | Facility: CLINIC | Age: 69
End: 2024-03-19

## 2024-03-20 DIAGNOSIS — Z79.02 LONG TERM (CURRENT) USE OF ANTITHROMBOTICS/ANTIPLATELETS: ICD-10-CM

## 2024-03-20 DIAGNOSIS — Z91.040 LATEX ALLERGY STATUS: ICD-10-CM

## 2024-03-20 DIAGNOSIS — Z88.8 ALLERGY STATUS TO OTHER DRUGS, MEDICAMENTS AND BIOLOGICAL SUBSTANCES STATUS: ICD-10-CM

## 2024-03-20 DIAGNOSIS — I65.22 OCCLUSION AND STENOSIS OF LEFT CAROTID ARTERY: ICD-10-CM

## 2024-03-20 DIAGNOSIS — Z88.0 ALLERGY STATUS TO PENICILLIN: ICD-10-CM

## 2024-03-20 DIAGNOSIS — I25.10 ATHEROSCLEROTIC HEART DISEASE OF NATIVE CORONARY ARTERY WITHOUT ANGINA PECTORIS: ICD-10-CM

## 2024-03-20 DIAGNOSIS — Z88.5 ALLERGY STATUS TO NARCOTIC AGENT: ICD-10-CM

## 2024-03-20 DIAGNOSIS — Z79.82 LONG TERM (CURRENT) USE OF ASPIRIN: ICD-10-CM

## 2024-03-20 DIAGNOSIS — Z87.891 PERSONAL HISTORY OF NICOTINE DEPENDENCE: ICD-10-CM

## 2024-03-20 DIAGNOSIS — I11.0 HYPERTENSIVE HEART DISEASE WITH HEART FAILURE: ICD-10-CM

## 2024-03-20 DIAGNOSIS — M54.12 RADICULOPATHY, CERVICAL REGION: ICD-10-CM

## 2024-03-20 DIAGNOSIS — E78.5 HYPERLIPIDEMIA, UNSPECIFIED: ICD-10-CM

## 2024-03-20 DIAGNOSIS — I50.32 CHRONIC DIASTOLIC (CONGESTIVE) HEART FAILURE: ICD-10-CM

## 2024-03-20 DIAGNOSIS — Z82.3 FAMILY HISTORY OF STROKE: ICD-10-CM

## 2024-03-20 DIAGNOSIS — Z88.6 ALLERGY STATUS TO ANALGESIC AGENT: ICD-10-CM

## 2024-03-20 DIAGNOSIS — E11.65 TYPE 2 DIABETES MELLITUS WITH HYPERGLYCEMIA: ICD-10-CM

## 2024-03-20 DIAGNOSIS — J45.20 MILD INTERMITTENT ASTHMA, UNCOMPLICATED: ICD-10-CM

## 2024-03-20 DIAGNOSIS — Z95.5 PRESENCE OF CORONARY ANGIOPLASTY IMPLANT AND GRAFT: ICD-10-CM

## 2024-03-20 DIAGNOSIS — G43.909 MIGRAINE, UNSPECIFIED, NOT INTRACTABLE, WITHOUT STATUS MIGRAINOSUS: ICD-10-CM

## 2024-04-03 NOTE — DISCHARGE NOTE PROVIDER - NSDCQMAMI_CARD_ALL_CORE
Last Written Prescription Date:  10/14/22  Last Fill Quantity: 15,  # refills: 5   Last office visit: 3/19/2024 with Dr. Lowe   Future Office Visit: 6/18/24         Requested Prescriptions   Pending Prescriptions Disp Refills    insulin aspart (FIASP FLEXTOUCH) 100 UNIT/ML pen-injector [Pharmacy Med Name: FIASP FLEXTOUCH PEN INJ 3ML] 15 mL 5     Sig: ADMINISTER 3 TO 8 UNITS UNDER THE SKIN THREE TIMES DAILY WITH MEALS.       There is no refill protocol information for this order        Refill sent  Chel Hall RN    
No

## 2025-06-10 ENCOUNTER — NON-APPOINTMENT (OUTPATIENT)
Age: 70
End: 2025-06-10

## 2025-06-10 ENCOUNTER — APPOINTMENT (OUTPATIENT)
Dept: VASCULAR SURGERY | Facility: CLINIC | Age: 70
End: 2025-06-10
Payer: MEDICARE

## 2025-06-10 VITALS
HEIGHT: 68 IN | BODY MASS INDEX: 27.43 KG/M2 | WEIGHT: 181 LBS | HEART RATE: 79 BPM | SYSTOLIC BLOOD PRESSURE: 108 MMHG | DIASTOLIC BLOOD PRESSURE: 64 MMHG

## 2025-06-10 PROCEDURE — 99213 OFFICE O/P EST LOW 20 MIN: CPT

## 2025-06-10 PROCEDURE — 93880 EXTRACRANIAL BILAT STUDY: CPT

## 2025-06-10 RX ORDER — ACETAMINOPHEN 500 MG/1
CAPSULE ORAL
Refills: 0 | Status: ACTIVE | COMMUNITY

## 2025-06-10 RX ORDER — PANCRELIPASE LIPASE, PANCRELIPASE PROTEASE, PANCRELIPASE AMYLASE 20000; 63000; 84000 [USP'U]/1; [USP'U]/1; [USP'U]/1
20000-63000 CAPSULE, DELAYED RELEASE ORAL
Refills: 0 | Status: ACTIVE | COMMUNITY

## 2025-06-10 RX ORDER — ESOMEPRAZOLE MAGNESIUM 40 MG/1
40 GRANULE, DELAYED RELEASE ORAL
Refills: 0 | Status: ACTIVE | COMMUNITY

## (undated) DEVICE — SUT SILK 2-0 12-18"

## (undated) DEVICE — SYR LUER LOK 30CC

## (undated) DEVICE — CATH IV OPTIVA 16G X 2"

## (undated) DEVICE — SUT SILK 4-0 18" TIES

## (undated) DEVICE — GLV 6.5 PROTEXIS (WHITE)

## (undated) DEVICE — GOWN ROYAL SILK XL

## (undated) DEVICE — URETERAL CATH RED RUBBER 14FR (GREEN)

## (undated) DEVICE — DRAPE IOBAN 13" X 13"

## (undated) DEVICE — FRAZIER SUCTION TIP 8FR

## (undated) DEVICE — SUT MONOCRYL 4-0 18" PS-2

## (undated) DEVICE — WARMING BLANKET LOWER ADULT

## (undated) DEVICE — SUT PROLENE 5-0 36" RB-1

## (undated) DEVICE — CANISTER SPECIMEN CONVERTOR PLASTIC

## (undated) DEVICE — INFLATOR ENCORE 26

## (undated) DEVICE — DRSG TEGADERM 4X10"

## (undated) DEVICE — SUT VICRYL 3-0 27" SH

## (undated) DEVICE — DRSG CURITY GAUZE SPONGE 4 X 4" 12-PLY NON-STERILE

## (undated) DEVICE — STAPLER SKIN PROXIMATE

## (undated) DEVICE — SUT PLEDGET SOFT MEDIUM 1/4" X 1/8" X 1/16" X6

## (undated) DEVICE — SUT PROLENE 6-0 30" RB-2

## (undated) DEVICE — SUT SILK 2-0 18" SH (POP-OFF)

## (undated) DEVICE — SUT VICRYL 2-0 27" CT-1 UNDYED

## (undated) DEVICE — GLV 7 DURAPRENE

## (undated) DEVICE — MARKING PEN W RULER

## (undated) DEVICE — POSITIONER FOAM HEAD DONUT 9" (PINK)

## (undated) DEVICE — SYR ASEPTO

## (undated) DEVICE — TOURNIQUET ARGYLE  VASCULAR KIT

## (undated) DEVICE — SUT PROLENE 7-0 30" BV-1

## (undated) DEVICE — GLV 7.5 PROTEXIS (WHITE)

## (undated) DEVICE — KNIFE ALCON CRESCENT STRAIGHT BEVEL UP 2.3MM (PINK)

## (undated) DEVICE — PACK VASCULAR MINOR

## (undated) DEVICE — DRAPE MAGNETIC INSTRUMENT MEDIUM

## (undated) DEVICE — SYR BULB 2OZ (BLUE)